# Patient Record
Sex: FEMALE | Race: WHITE | NOT HISPANIC OR LATINO | Employment: UNEMPLOYED | URBAN - METROPOLITAN AREA
[De-identification: names, ages, dates, MRNs, and addresses within clinical notes are randomized per-mention and may not be internally consistent; named-entity substitution may affect disease eponyms.]

---

## 2017-01-10 ENCOUNTER — HOSPITAL ENCOUNTER (EMERGENCY)
Facility: HOSPITAL | Age: 32
Discharge: HOME | End: 2017-01-10
Attending: EMERGENCY MEDICINE
Payer: MEDICAID

## 2017-01-10 VITALS
TEMPERATURE: 99 F | SYSTOLIC BLOOD PRESSURE: 139 MMHG | DIASTOLIC BLOOD PRESSURE: 75 MMHG | HEART RATE: 137 BPM | WEIGHT: 153 LBS | RESPIRATION RATE: 20 BRPM

## 2017-01-10 DIAGNOSIS — L02.91 ABSCESS: Primary | ICD-10-CM

## 2017-01-10 PROCEDURE — 99283 EMERGENCY DEPT VISIT LOW MDM: CPT

## 2017-01-10 PROCEDURE — 25000003 PHARM REV CODE 250: Performed by: EMERGENCY MEDICINE

## 2017-01-10 RX ORDER — METFORMIN HYDROCHLORIDE 500 MG/1
500 TABLET ORAL 2 TIMES DAILY WITH MEALS
COMMUNITY
End: 2019-11-22

## 2017-01-10 RX ORDER — CLINDAMYCIN HYDROCHLORIDE 150 MG/1
300 CAPSULE ORAL
Status: COMPLETED | OUTPATIENT
Start: 2017-01-10 | End: 2017-01-10

## 2017-01-10 RX ORDER — MUPIROCIN 20 MG/G
OINTMENT TOPICAL 3 TIMES DAILY
Qty: 15 G | Refills: 0 | Status: SHIPPED | OUTPATIENT
Start: 2017-01-10 | End: 2017-01-20

## 2017-01-10 RX ORDER — HYDROCODONE BITARTRATE AND ACETAMINOPHEN 5; 325 MG/1; MG/1
1 TABLET ORAL
Status: COMPLETED | OUTPATIENT
Start: 2017-01-10 | End: 2017-01-10

## 2017-01-10 RX ORDER — ALPRAZOLAM 0.5 MG/1
0.5 TABLET ORAL 2 TIMES DAILY PRN
COMMUNITY
End: 2018-01-19

## 2017-01-10 RX ORDER — HYDROCODONE BITARTRATE AND ACETAMINOPHEN 5; 325 MG/1; MG/1
1 TABLET ORAL EVERY 4 HOURS PRN
Qty: 15 TABLET | Refills: 0 | Status: SHIPPED | OUTPATIENT
Start: 2017-01-10 | End: 2017-01-20

## 2017-01-10 RX ORDER — CLINDAMYCIN HYDROCHLORIDE 150 MG/1
300 CAPSULE ORAL 4 TIMES DAILY
Qty: 56 CAPSULE | Refills: 0 | Status: SHIPPED | OUTPATIENT
Start: 2017-01-10 | End: 2017-01-17

## 2017-01-10 RX ADMIN — CLINDAMYCIN HYDROCHLORIDE 300 MG: 150 CAPSULE ORAL at 06:01

## 2017-01-10 RX ADMIN — HYDROCODONE BITARTATE AND ACETAMINOPHEN 1 TABLET: 5; 325 TABLET ORAL at 06:01

## 2017-01-10 NOTE — ED AVS SNAPSHOT
[facility] [address] [facility] [address]               Kiana Ryder   1/10/2017  6:08 PM   ED    Description:  Female : 1985   Department:  [facility]           Your Care was Coordinated By:     Provider Role From To    Dick Del Real MD Attending Provider 01/10/17 0082 --      Reason for Visit     Abscess           Diagnoses this Visit        Comments    Abscess    -  Primary       ED Disposition     ED Disposition Condition Comment    Discharge             To Do List           Follow-up Information     Schedule an appointment as soon as possible for a visit with Isaac Holloway MD.    Specialty:  Family Medicine    Contact information:    [address] [facility]  [place] [id number] [phone]         These Medications        Disp Refills Start End    clindamycin (CLEOCIN) 150 MG capsule 56 capsule 0 1/10/2017 2017    Take 2 capsules (300 mg total) by mouth 4 (four) times daily. - Oral    mupirocin (BACTROBAN) 2 % ointment 15 g 0 1/10/2017 2017    Apply topically 3 (three) times daily. Apply to affected area - Topical (Top)    hydrocodone-acetaminophen 5-325mg (NORCO) 5-325 mg per tablet 15 tablet 0 1/10/2017 2017    Take 1 tablet by mouth every 4 (four) hours as needed. - Oral      [facility] -  Assistance  Registered nurses in the [facility] provide clinical advisement, health education, appointment booking, and other advisory services.  Call for this free service at [phone].             Medications           Message regarding Medications     Verify the changes and/or additions to your medication regime listed below are the same as discussed with your clinician today.  If any of these changes or additions are incorrect, please notify your healthcare provider.        START taking these NEW medications        Refills    clindamycin  (CLEOCIN) 150 MG capsule 0    Sig: Take 2 capsules (300 mg total) by mouth 4 (four) times daily.    Class: Print    Route: Oral    mupirocin (BACTROBAN) 2 % ointment 0    Sig: Apply topically 3 (three) times daily. Apply to affected area    Class: Print    Route: Topical (Top)    hydrocodone-acetaminophen 5-325mg (NORCO) 5-325 mg per tablet 0    Sig: Take 1 tablet by mouth every 4 (four) hours as needed.    Class: Print    Route: Oral      These medications were administered today        Dose Freq    clindamycin capsule 300 mg 300 mg ED 1 Time    Sig: Take 2 capsules (300 mg total) by mouth ED 1 Time.    Class: Normal    Route: Oral    hydrocodone-acetaminophen 5-325mg per tablet 1 tablet 1 tablet ED 1 Time    Sig: Take 1 tablet by mouth ED 1 Time.    Class: Normal    Route: Oral           Verify that the below list of medications is an accurate representation of the medications you are currently taking.  If none reported, the list may be blank. If incorrect, please contact your healthcare provider. Carry this list with you in case of emergency.           Current Medications     alprazolam (XANAX) 0.5 MG tablet Take 0.5 mg by mouth 2 (two) times daily as needed for Anxiety.    estradiol cypionate (DEPO-ESTRADIOL) 5 mg/mL injection Inject into the muscle every 28 days.    metformin (GLUCOPHAGE) 500 MG tablet Take 500 mg by mouth 2 (two) times daily with meals.    clindamycin (CLEOCIN) 150 MG capsule Take 2 capsules (300 mg total) by mouth 4 (four) times daily.    clindamycin capsule 300 mg Take 2 capsules (300 mg total) by mouth ED 1 Time.    hydrocodone-acetaminophen 5-325mg (NORCO) 5-325 mg per tablet Take 1 tablet by mouth every 4 (four) hours as needed.    hydrocodone-acetaminophen 5-325mg per tablet 1 tablet Take 1 tablet by mouth ED 1 Time.    ketorolac (TORADOL) 10 mg tablet Take 1 tablet (10 mg total) by mouth 3 (three) times daily as needed.    mupirocin (BACTROBAN) 2 % ointment Apply topically 3 (three)  times daily. Apply to affected area    oxycodone-acetaminophen (PERCOCET) 5-325 mg per tablet Take 1 tablet by mouth every 4 (four) hours as needed for Pain.    sitagliptan-metformin (JANUMET)  mg per tablet Take 1 tablet by mouth 2 (two) times daily with meals.           Clinical Reference Information           Your Vitals Were     BP Pulse Temp Resp Weight       139/75 (BP Location: Left arm, Patient Position: Standing) 137 98.7 °F (37.1 °C) (Oral) 20 69.4 kg (153 lb)       Allergies as of 1/10/2017     No Known Allergies      Immunizations Administered on Date of Encounter - 1/10/2017     None      ED Micro, Lab, POCT     None      ED Imaging Orders     None        Discharge Instructions         Abscess (Antibiotic Treatment Only)  An abscess (sometimes called a boil) occurs when bacteria get trapped under the skin and begin to grow. Pus forms inside the abscess as the body responds to the bacteria. An abscess can occur with an insect bite, ingrown hair, blocked oil gland, pimple, cyst, or puncture wound.  In the early stages, redness and tenderness are the only symptoms. Sometimes, this stage can be treated with antibiotics alone. If the abscess does not respond to antibiotic treatment, it will need to be drained with a small cut, under local anesthesia.  Home care  The following will help you care for your abscess at home:  · Soak the wound in hot water or apply hot packs (small towel soaked in hot water) to the area for 20 minutes at a time. Do this 3 to 4 times a day.  · Do not dee, squeeze, or pop the boil yourself.  · Apply antibiotic cream or ointment onto the skin 3 to 4 times a day, unless something else was prescribed. Some ointments include an antibiotic plus a local pain reliever.  · If your doctor prescribed antibiotics, do not stop taking this medication until you have finished the medication or the doctor tells you to stop.  · You may use an over-the-counter pain medication to control  pain, unless another pain medicine was prescribed. If you have chronic liver or kidney disease or ever had a stomach ulcer or gastrointestinal bleeding, talk with your doctor before using these any of these.  Follow-up care  Follow up with your health care provider as advised by our staff. Look at your wound each day for the signs of worsening infection listed below.  When to seek medical care  Get prompt medical attention if any of the following occur:  · An increase in redness or swelling  · Red streaks in the skin leading away from the abscess  · An increase in local pain or swelling  · Fever of 100.4ºF (38ºC) or higher, or as directed by your health care provider  · Pus or fluid coming from the abscess  · Boil returns after getting better  © 1463-4479 [employer]. [address] [place] [address], [place] [address]. All rights reserved. This information is not intended as a substitute for professional medical care. Always follow your healthcare professional's instructions.          Smoking Cessation     If you would like to quit smoking:   You may be eligible for free services if you are a [place] resident and started smoking cigarettes before September 1, 1988.  Call the Smoking Cessation Trust (SCT) toll free at (811) 595-4416 or (025) 625-6857.   Call 1-800-QUIT-NOW if you do not meet the above criteria.             [facility] complies with applicable Federal civil rights laws and does not discriminate on the basis of race, color, national origin, age, disability, or sex.        Language Assistance Services     ATTENTION: Language assistance services are available, free of charge. Please call [phone].      ATENCIÓN: Si habla español, tiene a hinojosa disposición servicios gratuitos de asistencia lingüística. Llame al [phone].     CHÚ Ý: N?u b?n nói Ti?ng Vi?t, có các d?ch v? h? tr? ngôn ng? mi?n phí dành cho b?n. G?i s? [phone].

## 2017-01-11 NOTE — DISCHARGE INSTRUCTIONS
Abscess (Antibiotic Treatment Only)  An abscess (sometimes called a boil) occurs when bacteria get trapped under the skin and begin to grow. Pus forms inside the abscess as the body responds to the bacteria. An abscess can occur with an insect bite, ingrown hair, blocked oil gland, pimple, cyst, or puncture wound.  In the early stages, redness and tenderness are the only symptoms. Sometimes, this stage can be treated with antibiotics alone. If the abscess does not respond to antibiotic treatment, it will need to be drained with a small cut, under local anesthesia.  Home care  The following will help you care for your abscess at home:  · Soak the wound in hot water or apply hot packs (small towel soaked in hot water) to the area for 20 minutes at a time. Do this 3 to 4 times a day.  · Do not dee, squeeze, or pop the boil yourself.  · Apply antibiotic cream or ointment onto the skin 3 to 4 times a day, unless something else was prescribed. Some ointments include an antibiotic plus a local pain reliever.  · If your doctor prescribed antibiotics, do not stop taking this medication until you have finished the medication or the doctor tells you to stop.  · You may use an over-the-counter pain medication to control pain, unless another pain medicine was prescribed. If you have chronic liver or kidney disease or ever had a stomach ulcer or gastrointestinal bleeding, talk with your doctor before using these any of these.  Follow-up care  Follow up with your health care provider as advised by our staff. Look at your wound each day for the signs of worsening infection listed below.  When to seek medical care  Get prompt medical attention if any of the following occur:  · An increase in redness or swelling  · Red streaks in the skin leading away from the abscess  · An increase in local pain or swelling  · Fever of 100.4ºF (38ºC) or higher, or as directed by your health care provider  · Pus or fluid coming from the  abscess  · Boil returns after getting better  © 4706-5953 [employer], [employer]. [address] [place] [address], [place] [address]. All rights reserved. This information is not intended as a substitute for professional medical care. Always follow your healthcare professional's instructions.

## 2017-01-11 NOTE — ED PROVIDER NOTES
Encounter Date: 1/10/2017       History     Chief Complaint   Patient presents with    Abscess     right buttox     Review of patient's allergies indicates:  No Known Allergies  Patient is a 31 y.o. female presenting with the following complaint: abscess. The history is provided by the patient.   Abscess    This is a new problem. The current episode started several days ago. The problem has been gradually improving. The abscess is present on the right buttock. The pain is at a severity of 4/10. The abscess is characterized by redness, scaling and draining. Pertinent negatives include no anorexia, no decrease in physical activity, not sleeping less, not drinking less, no fever, no diarrhea, no vomiting, no congestion, no rhinorrhea, no sore throat, no decreased responsiveness and no cough. There were no sick contacts. She has received no recent medical care.     Past Medical History   Diagnosis Date    Diabetes mellitus      No past medical history pertinent negatives.  Past Surgical History   Procedure Laterality Date    Cholecystectomy       section       History reviewed. No pertinent family history.  Social History   Substance Use Topics    Smoking status: Current Some Day Smoker     Packs/day: 1.00     Years: 15.00     Types: Cigarettes    Smokeless tobacco: None    Alcohol use Yes      Comment: occasionally     Review of Systems   Constitutional: Negative for decreased responsiveness and fever.   HENT: Negative for congestion, ear discharge, ear pain, rhinorrhea and sore throat.    Eyes: Negative for pain, discharge, redness and itching.   Respiratory: Negative for cough, shortness of breath, wheezing and stridor.    Cardiovascular: Negative for chest pain, palpitations and leg swelling.   Gastrointestinal: Negative for anorexia, diarrhea and vomiting.   Genitourinary: Negative for enuresis and flank pain.   Musculoskeletal: Negative for back pain, gait problem, neck pain and neck stiffness.    Skin: Positive for rash. Negative for wound.   Neurological: Negative for tremors, seizures, syncope, speech difficulty and weakness.       Physical Exam   Initial Vitals   BP Pulse Resp Temp SpO2   01/10/17 1804 01/10/17 1804 01/10/17 1804 01/10/17 1804 --   139/75 137 20 98.7 °F (37.1 °C)      Physical Exam    Nursing note and vitals reviewed.  Constitutional: She appears well-developed and well-nourished. She is not diaphoretic. No distress.   HENT:   Head: Normocephalic and atraumatic.   Mouth/Throat: No oropharyngeal exudate.   Eyes: Conjunctivae are normal. Pupils are equal, round, and reactive to light. Right eye exhibits no discharge. Left eye exhibits no discharge. No scleral icterus.   Neck: Normal range of motion. Neck supple.   Cardiovascular: Exam reveals no friction rub.    Pulmonary/Chest: Breath sounds normal. No respiratory distress. She has no wheezes. She has no rhonchi. She has no rales.   Abdominal: Soft. Bowel sounds are normal. She exhibits no distension. There is no tenderness. There is no rebound and no guarding.   Musculoskeletal: Normal range of motion. She exhibits no edema or tenderness.   Lymphadenopathy:     She has no cervical adenopathy.   Neurological: She is alert. She has normal strength and normal reflexes. No sensory deficit.   Skin: Abscess noted.              ED Course   Procedures  Labs Reviewed - No data to display                            ED Course     Clinical Impression:   The encounter diagnosis was Abscess.    Disposition:   Disposition: Discharged  Condition: Stable       Dick Del Real MD  01/10/17 4655

## 2017-01-11 NOTE — ED NOTES
"Pt presents to ER with abscess to right butt ox. Pt states that "it started as a little pimple but now is the size of a grape fruit."   "

## 2018-08-06 PROBLEM — L02.91 ABSCESS: Status: ACTIVE | Noted: 2018-08-06

## 2018-08-07 PROBLEM — Z72.0 TOBACCO ABUSE: Status: ACTIVE | Noted: 2018-08-07

## 2018-08-07 PROBLEM — E11.9 TYPE 2 DIABETES MELLITUS, WITHOUT LONG-TERM CURRENT USE OF INSULIN: Status: ACTIVE | Noted: 2018-08-07

## 2018-08-07 PROBLEM — L03.213 CELLULITIS OF PERIORBITAL REGION OF BOTH EYES: Status: ACTIVE | Noted: 2018-08-07

## 2019-07-24 ENCOUNTER — LAB VISIT (OUTPATIENT)
Dept: LAB | Facility: HOSPITAL | Age: 34
End: 2019-07-24
Attending: GENERAL PRACTICE
Payer: MEDICAID

## 2019-07-24 DIAGNOSIS — E03.9 HYPOTHYROID: ICD-10-CM

## 2019-07-24 DIAGNOSIS — E11.9 DM (DIABETES MELLITUS): ICD-10-CM

## 2019-07-24 DIAGNOSIS — I10 HTN (HYPERTENSION): Primary | ICD-10-CM

## 2019-07-24 LAB
ALBUMIN SERPL BCP-MCNC: 2.9 G/DL (ref 3.5–5.2)
ALP SERPL-CCNC: 58 U/L (ref 55–135)
ALT SERPL W/O P-5'-P-CCNC: 14 U/L (ref 10–44)
ANION GAP SERPL CALC-SCNC: 9 MMOL/L (ref 8–16)
AST SERPL-CCNC: 8 U/L (ref 10–40)
BASOPHILS # BLD AUTO: 0.06 K/UL (ref 0–0.2)
BASOPHILS NFR BLD: 0.5 % (ref 0–1.9)
BILIRUB SERPL-MCNC: 0.2 MG/DL (ref 0.1–1)
BUN SERPL-MCNC: 11 MG/DL (ref 6–20)
CALCIUM SERPL-MCNC: 9.1 MG/DL (ref 8.7–10.5)
CHLORIDE SERPL-SCNC: 102 MMOL/L (ref 95–110)
CHOLEST SERPL-MCNC: 192 MG/DL (ref 120–199)
CHOLEST/HDLC SERPL: 4.3 {RATIO} (ref 2–5)
CO2 SERPL-SCNC: 24 MMOL/L (ref 23–29)
CREAT SERPL-MCNC: 0.7 MG/DL (ref 0.5–1.4)
DIFFERENTIAL METHOD: ABNORMAL
EOSINOPHIL # BLD AUTO: 0.3 K/UL (ref 0–0.5)
EOSINOPHIL NFR BLD: 2.2 % (ref 0–8)
ERYTHROCYTE [DISTWIDTH] IN BLOOD BY AUTOMATED COUNT: 14.2 % (ref 11.5–14.5)
EST. GFR  (AFRICAN AMERICAN): >60 ML/MIN/1.73 M^2
EST. GFR  (NON AFRICAN AMERICAN): >60 ML/MIN/1.73 M^2
ESTIMATED AVG GLUCOSE: 148 MG/DL (ref 68–131)
GLUCOSE SERPL-MCNC: 166 MG/DL (ref 70–110)
HBA1C MFR BLD HPLC: 6.8 % (ref 4–5.6)
HCT VFR BLD AUTO: 38.9 % (ref 37–48.5)
HDLC SERPL-MCNC: 45 MG/DL (ref 40–75)
HDLC SERPL: 23.4 % (ref 20–50)
HGB BLD-MCNC: 12.6 G/DL (ref 12–16)
IMM GRANULOCYTES # BLD AUTO: 0.11 K/UL (ref 0–0.04)
IMM GRANULOCYTES NFR BLD AUTO: 1 % (ref 0–0.5)
LDLC SERPL CALC-MCNC: 106.8 MG/DL (ref 63–159)
LYMPHOCYTES # BLD AUTO: 3.5 K/UL (ref 1–4.8)
LYMPHOCYTES NFR BLD: 30.8 % (ref 18–48)
MCH RBC QN AUTO: 28.8 PG (ref 27–31)
MCHC RBC AUTO-ENTMCNC: 32.4 G/DL (ref 32–36)
MCV RBC AUTO: 89 FL (ref 82–98)
MONOCYTES # BLD AUTO: 0.9 K/UL (ref 0.3–1)
MONOCYTES NFR BLD: 8.1 % (ref 4–15)
NEUTROPHILS # BLD AUTO: 6.5 K/UL (ref 1.8–7.7)
NEUTROPHILS NFR BLD: 57.4 % (ref 38–73)
NONHDLC SERPL-MCNC: 147 MG/DL
NRBC BLD-RTO: 0 /100 WBC
PLATELET # BLD AUTO: 231 K/UL (ref 150–350)
PMV BLD AUTO: 11.2 FL (ref 9.2–12.9)
POTASSIUM SERPL-SCNC: 4.1 MMOL/L (ref 3.5–5.1)
PROT SERPL-MCNC: 6.3 G/DL (ref 6–8.4)
RBC # BLD AUTO: 4.37 M/UL (ref 4–5.4)
SODIUM SERPL-SCNC: 135 MMOL/L (ref 136–145)
T4 FREE SERPL-MCNC: 0.85 NG/DL (ref 0.71–1.51)
TRIGL SERPL-MCNC: 201 MG/DL (ref 30–150)
TSH SERPL DL<=0.005 MIU/L-ACNC: 4.04 UIU/ML (ref 0.4–4)
WBC # BLD AUTO: 11.36 K/UL (ref 3.9–12.7)

## 2019-07-24 PROCEDURE — 80061 LIPID PANEL: CPT

## 2019-07-24 PROCEDURE — 36415 COLL VENOUS BLD VENIPUNCTURE: CPT

## 2019-07-24 PROCEDURE — 84439 ASSAY OF FREE THYROXINE: CPT

## 2019-07-24 PROCEDURE — 80053 COMPREHEN METABOLIC PANEL: CPT

## 2019-07-24 PROCEDURE — 83036 HEMOGLOBIN GLYCOSYLATED A1C: CPT

## 2019-07-24 PROCEDURE — 84443 ASSAY THYROID STIM HORMONE: CPT

## 2019-07-24 PROCEDURE — 85025 COMPLETE CBC W/AUTO DIFF WBC: CPT

## 2019-08-02 ENCOUNTER — HOSPITAL ENCOUNTER (EMERGENCY)
Facility: HOSPITAL | Age: 34
Discharge: HOME | End: 2019-08-02
Attending: SURGERY
Payer: MEDICAID

## 2019-08-02 VITALS
OXYGEN SATURATION: 99 % | TEMPERATURE: 98 F | SYSTOLIC BLOOD PRESSURE: 135 MMHG | DIASTOLIC BLOOD PRESSURE: 66 MMHG | HEART RATE: 90 BPM | RESPIRATION RATE: 20 BRPM

## 2019-08-02 DIAGNOSIS — Z33.1 PREGNANCY AS INCIDENTAL FINDING: Primary | ICD-10-CM

## 2019-08-02 DIAGNOSIS — O23.41 URINARY TRACT INFECTION IN MOTHER DURING FIRST TRIMESTER OF PREGNANCY: ICD-10-CM

## 2019-08-02 DIAGNOSIS — R10.2 PELVIC PRESSURE IN PREGNANCY: ICD-10-CM

## 2019-08-02 DIAGNOSIS — O26.899 PELVIC PRESSURE IN PREGNANCY: ICD-10-CM

## 2019-08-02 LAB
ALBUMIN SERPL BCP-MCNC: 3 G/DL (ref 3.5–5.2)
ALP SERPL-CCNC: 45 U/L (ref 55–135)
ALT SERPL W/O P-5'-P-CCNC: 6 U/L (ref 10–44)
AMPHET+METHAMPHET UR QL: NEGATIVE
ANION GAP SERPL CALC-SCNC: 12 MMOL/L (ref 8–16)
AST SERPL-CCNC: 9 U/L (ref 10–40)
B-HCG UR QL: POSITIVE
BACTERIA #/AREA URNS HPF: ABNORMAL /HPF
BARBITURATES UR QL SCN>200 NG/ML: NEGATIVE
BASOPHILS # BLD AUTO: 0.04 K/UL (ref 0–0.2)
BASOPHILS NFR BLD: 0.4 % (ref 0–1.9)
BENZODIAZ UR QL SCN>200 NG/ML: NEGATIVE
BILIRUB SERPL-MCNC: 0.2 MG/DL (ref 0.1–1)
BILIRUB UR QL STRIP: NEGATIVE
BUN SERPL-MCNC: 10 MG/DL (ref 6–20)
BZE UR QL SCN: NEGATIVE
CALCIUM SERPL-MCNC: 8.7 MG/DL (ref 8.7–10.5)
CANNABINOIDS UR QL SCN: NORMAL
CHLORIDE SERPL-SCNC: 107 MMOL/L (ref 95–110)
CK SERPL-CCNC: 47 U/L (ref 20–180)
CLARITY UR: ABNORMAL
CO2 SERPL-SCNC: 18 MMOL/L (ref 23–29)
COLOR UR: YELLOW
CREAT SERPL-MCNC: 0.7 MG/DL (ref 0.5–1.4)
CREAT UR-MCNC: 107.6 MG/DL (ref 15–325)
DIFFERENTIAL METHOD: ABNORMAL
EOSINOPHIL # BLD AUTO: 0.2 K/UL (ref 0–0.5)
EOSINOPHIL NFR BLD: 1.4 % (ref 0–8)
ERYTHROCYTE [DISTWIDTH] IN BLOOD BY AUTOMATED COUNT: 14.4 % (ref 11.5–14.5)
EST. GFR  (AFRICAN AMERICAN): >60 ML/MIN/1.73 M^2
EST. GFR  (NON AFRICAN AMERICAN): >60 ML/MIN/1.73 M^2
GLUCOSE SERPL-MCNC: 136 MG/DL (ref 70–110)
GLUCOSE UR QL STRIP: ABNORMAL
HCG INTACT+B SERPL-ACNC: NORMAL MIU/ML
HCT VFR BLD AUTO: 34.3 % (ref 37–48.5)
HGB BLD-MCNC: 11.3 G/DL (ref 12–16)
HGB UR QL STRIP: NEGATIVE
IMM GRANULOCYTES # BLD AUTO: 0.05 K/UL (ref 0–0.04)
IMM GRANULOCYTES NFR BLD AUTO: 0.5 % (ref 0–0.5)
KETONES UR QL STRIP: NEGATIVE
LEUKOCYTE ESTERASE UR QL STRIP: ABNORMAL
LYMPHOCYTES # BLD AUTO: 3.2 K/UL (ref 1–4.8)
LYMPHOCYTES NFR BLD: 29.6 % (ref 18–48)
MAGNESIUM SERPL-MCNC: 1.6 MG/DL (ref 1.6–2.6)
MCH RBC QN AUTO: 28.9 PG (ref 27–31)
MCHC RBC AUTO-ENTMCNC: 32.9 G/DL (ref 32–36)
MCV RBC AUTO: 88 FL (ref 82–98)
METHADONE UR QL SCN>300 NG/ML: NEGATIVE
MICROSCOPIC COMMENT: ABNORMAL
MONOCYTES # BLD AUTO: 0.6 K/UL (ref 0.3–1)
MONOCYTES NFR BLD: 5.6 % (ref 4–15)
NEUTROPHILS # BLD AUTO: 6.8 K/UL (ref 1.8–7.7)
NEUTROPHILS NFR BLD: 62.5 % (ref 38–73)
NITRITE UR QL STRIP: POSITIVE
NRBC BLD-RTO: 0 /100 WBC
OPIATES UR QL SCN: NEGATIVE
PCP UR QL SCN>25 NG/ML: NEGATIVE
PH UR STRIP: 7 [PH] (ref 5–8)
PHOSPHATE SERPL-MCNC: 3.3 MG/DL (ref 2.7–4.5)
PLATELET # BLD AUTO: 227 K/UL (ref 150–350)
PMV BLD AUTO: 10.7 FL (ref 9.2–12.9)
POTASSIUM SERPL-SCNC: 3.3 MMOL/L (ref 3.5–5.1)
PROT SERPL-MCNC: 6.1 G/DL (ref 6–8.4)
PROT UR QL STRIP: NEGATIVE
RBC # BLD AUTO: 3.91 M/UL (ref 4–5.4)
SODIUM SERPL-SCNC: 137 MMOL/L (ref 136–145)
SP GR UR STRIP: 1.02 (ref 1–1.03)
TOXICOLOGY INFORMATION: NORMAL
TSH SERPL DL<=0.005 MIU/L-ACNC: 1.42 UIU/ML (ref 0.4–4)
URN SPEC COLLECT METH UR: ABNORMAL
UROBILINOGEN UR STRIP-ACNC: NEGATIVE EU/DL
WBC # BLD AUTO: 10.8 K/UL (ref 3.9–12.7)
WBC #/AREA URNS HPF: 40 /HPF (ref 0–5)
WBC CLUMPS URNS QL MICRO: ABNORMAL

## 2019-08-02 PROCEDURE — 87088 URINE BACTERIA CULTURE: CPT

## 2019-08-02 PROCEDURE — 81025 URINE PREGNANCY TEST: CPT

## 2019-08-02 PROCEDURE — 80053 COMPREHEN METABOLIC PANEL: CPT

## 2019-08-02 PROCEDURE — 81000 URINALYSIS NONAUTO W/SCOPE: CPT | Mod: 59

## 2019-08-02 PROCEDURE — 80307 DRUG TEST PRSMV CHEM ANLYZR: CPT

## 2019-08-02 PROCEDURE — 87086 URINE CULTURE/COLONY COUNT: CPT

## 2019-08-02 PROCEDURE — 84100 ASSAY OF PHOSPHORUS: CPT

## 2019-08-02 PROCEDURE — 36415 COLL VENOUS BLD VENIPUNCTURE: CPT

## 2019-08-02 PROCEDURE — 85025 COMPLETE CBC W/AUTO DIFF WBC: CPT

## 2019-08-02 PROCEDURE — 87077 CULTURE AEROBIC IDENTIFY: CPT | Mod: 59

## 2019-08-02 PROCEDURE — 84443 ASSAY THYROID STIM HORMONE: CPT

## 2019-08-02 PROCEDURE — 87186 SC STD MICRODIL/AGAR DIL: CPT

## 2019-08-02 PROCEDURE — 99284 EMERGENCY DEPT VISIT MOD MDM: CPT

## 2019-08-02 PROCEDURE — 84702 CHORIONIC GONADOTROPIN TEST: CPT

## 2019-08-02 PROCEDURE — 82550 ASSAY OF CK (CPK): CPT

## 2019-08-02 PROCEDURE — 83735 ASSAY OF MAGNESIUM: CPT

## 2019-08-02 RX ORDER — NITROFURANTOIN 25; 75 MG/1; MG/1
100 CAPSULE ORAL 2 TIMES DAILY
Qty: 14 CAPSULE | Refills: 0 | Status: SHIPPED | OUTPATIENT
Start: 2019-08-02 | End: 2019-08-09

## 2019-08-02 NOTE — ED PROVIDER NOTES
"Encounter Date: 2019       History     Chief Complaint   Patient presents with    Leg Pain     bilateral cramping     Kiana Ryder is a 33 y.o. Female with PMH of DM who presents to the ED with reports of bilateral leg cramps. Patient reports that she started a new job that requires climbing stairs. She reports that she is not used to this much physical activity and her legs started hurting after starting new job. She reports "muscle" pain to bilateral legs that is not relieved by otc medication. Pain is the same all the time, occurring mostly at night. She currently rates pain 4/10 on pain scale. She denies swelling. She denies chest pain or shortness of breath.     The history is provided by the patient.     Review of patient's allergies indicates:  No Known Allergies  Past Medical History:   Diagnosis Date    Diabetes mellitus      Past Surgical History:   Procedure Laterality Date     SECTION      CHOLECYSTECTOMY       History reviewed. No pertinent family history.  Social History     Tobacco Use    Smoking status: Current Some Day Smoker     Packs/day: 1.00     Years: 15.00     Pack years: 15.00     Types: Cigarettes    Smokeless tobacco: Never Used   Substance Use Topics    Alcohol use: Yes     Comment: occasionally    Drug use: Yes     Types: Methamphetamines     Review of Systems   Constitutional: Negative.  Negative for activity change, chills and fever.   HENT: Negative.  Negative for congestion, ear discharge, ear pain, postnasal drip, sinus pressure, sinus pain and sore throat.    Eyes: Negative.    Respiratory: Negative.  Negative for cough, chest tightness and shortness of breath.    Cardiovascular: Negative.  Negative for chest pain.   Gastrointestinal: Negative.  Negative for abdominal distention, abdominal pain and nausea.   Endocrine: Negative.    Genitourinary: Negative.  Negative for dysuria, frequency and urgency.   Musculoskeletal: Positive for myalgias. Negative for back " pain and joint swelling.   Skin: Negative.  Negative for rash.   Allergic/Immunologic: Negative.    Neurological: Negative.  Negative for dizziness, weakness, light-headedness and numbness.   Hematological: Negative.  Does not bruise/bleed easily.   Psychiatric/Behavioral: Negative.        Physical Exam     Initial Vitals [08/02/19 1344]   BP Pulse Resp Temp SpO2   (!) 145/67 101 20 98.5 °F (36.9 °C) 100 %      MAP       --         Physical Exam    Nursing note and vitals reviewed.  Constitutional: She appears well-developed and well-nourished.   HENT:   Head: Normocephalic and atraumatic.   Right Ear: Tympanic membrane, external ear and ear canal normal.   Left Ear: Tympanic membrane, external ear and ear canal normal.   Nose: Nose normal.   Mouth/Throat: Uvula is midline, oropharynx is clear and moist and mucous membranes are normal.   Eyes: Conjunctivae and EOM are normal. Pupils are equal, round, and reactive to light.   Neck: Normal range of motion. Neck supple.   Cardiovascular: Normal rate, regular rhythm, normal heart sounds, intact distal pulses and normal pulses.  No extrasystoles are present.    Pulmonary/Chest: Effort normal and breath sounds normal. She has no decreased breath sounds. She has no wheezes. She has no rhonchi. She has no rales.   Abdominal: Soft. Normal appearance and bowel sounds are normal. There is no tenderness. There is no rigidity, no rebound, no guarding, no CVA tenderness, no tenderness at McBurney's point and negative Tidwell's sign. No hernia.   Musculoskeletal: Normal range of motion.   Neurological: She is alert and oriented to person, place, and time. She has normal strength and normal reflexes. She displays normal reflexes. No cranial nerve deficit or sensory deficit. GCS eye subscore is 4. GCS verbal subscore is 5. GCS motor subscore is 6.   Skin: Skin is warm and dry. Capillary refill takes less than 2 seconds. No rash noted.   Psychiatric: She has a normal mood and affect.  Her behavior is normal. Judgment and thought content normal.         ED Course   Procedures  Labs Reviewed   CBC W/ AUTO DIFFERENTIAL - Abnormal; Notable for the following components:       Result Value    RBC 3.91 (*)     Hemoglobin 11.3 (*)     Hematocrit 34.3 (*)     Immature Grans (Abs) 0.05 (*)     All other components within normal limits   COMPREHENSIVE METABOLIC PANEL - Abnormal; Notable for the following components:    Potassium 3.3 (*)     CO2 18 (*)     Glucose 136 (*)     Albumin 3.0 (*)     Alkaline Phosphatase 45 (*)     AST 9 (*)     ALT 6 (*)     All other components within normal limits   URINALYSIS, REFLEX TO URINE CULTURE - Abnormal; Notable for the following components:    Appearance, UA Hazy (*)     Glucose, UA Trace (*)     Nitrite, UA Positive (*)     Leukocytes, UA Trace (*)     All other components within normal limits    Narrative:     Preferred Collection Type->Urine, Clean Catch   PREGNANCY TEST, URINE RAPID - Abnormal; Notable for the following components:    Preg Test, Ur Positive (*)     All other components within normal limits   URINALYSIS MICROSCOPIC - Abnormal; Notable for the following components:    WBC, UA 40 (*)     WBC Clumps, UA Few (*)     Bacteria Many (*)     All other components within normal limits    Narrative:     Preferred Collection Type->Urine, Clean Catch   CULTURE, URINE   DRUG SCREEN PANEL, URINE EMERGENCY   MAGNESIUM   PHOSPHORUS   TSH   CK   HCG, QUANTITATIVE, PREGNANCY          Imaging Results           US OB Less Than 14 Wks First Gestation (Final result)  Result time 08/02/19 16:12:16    Final result by Tyler Jensen MD (08/02/19 16:12:16)                 Impression:      1. Single, living intrauterine gestation and estimated sonographic age of 10 weeks and 2 days.  2. Large complex, cystic and septated right adnexal mass lesion measuring approximately 17 x 15 cm in size.  Cystic ovarian neoplasm is a consideration and follow-up is recommended.  This  "report was flagged in Epic as abnormal.      Electronically signed by: Tyler Jensen MD  Date:    08/02/2019  Time:    16:12             Narrative:    EXAMINATION:  US OB LESS THAN 14 WEEKS FIRST GESTATION    CLINICAL HISTORY:  Pregnant state, incidental    TECHNIQUE:  Real-time early OB ultrasound examination was performed.    COMPARISON:  None.    FINDINGS:  Early OB ultrasound examination dated August 2, 2019.    There is a single, living intrauterine gestation with a heart rate of 152 beats/min.  Based on the crown-rump length measurement estimated sonographic age is 10 weeks and 2 days.    No evidence of a placental abnormality.  No evidence of hemorrhage.    The left ovary appears normal measuring 3.5 x 4.3 x 2.4 cm.  Normal blood flow to the left ovary.  There is a large complex cystic right adnexal mass lesion measuring approximately 17 x 15 cm.  Normal right ovarian tissue could not be visualized.  No significant free pelvic fluid.                                 Medical Decision Making:   Clinical Tests:   Lab Tests: Ordered and Reviewed  Radiological Study: Ordered and Reviewed  ED Management:  Patient presents today with restless legs, incidental positive UPT noted. She reports occasional pelvic pressure; denies vaginal discharge, bleeding or leakage. Reports LMP "middle of June, but was barely a period." Ultrasound completed;   1. Single, living intrauterine gestation and estimated sonographic age of 10 weeks and 2 days.  2. Large complex, cystic and septated right adnexal mass lesion measuring approximately 17 x 15 cm in size.  Cystic ovarian neoplasm is a consideration and follow-up is recommended  Patient extensively counseled on close f/u with OB/GYN; she voice understanding of instructions. Patient was given specific return precautions. The patient agrees to comply with all instruction and directions given in the ER.                       Clinical Impression:       ICD-10-CM ICD-9-CM   1. Pregnancy " as incidental finding Z33.1 V22.2   2. Pelvic pressure in pregnancy O26.899 646.83    R10.2 625.9   3. Urinary tract infection in mother during first trimester of pregnancy O23.41 646.63     599.0         Disposition:   Disposition: Discharged  Condition: Stable    Discharge Medication List as of 8/2/2019  4:19 PM      START taking these medications    Details   nitrofurantoin, macrocrystal-monohydrate, (MACROBID) 100 MG capsule Take 1 capsule (100 mg total) by mouth 2 (two) times daily. for 7 days, Starting Fri 8/2/2019, Until Fri 8/9/2019, Print            The patient acknowledges that close follow up with medical provider is required. Instructed to follow up with PCP within 2 days. Patient was given specific return precautions. The patient agrees to comply with all instruction and directions given in the ER.                  Fatmata Juan NP  08/02/19 5541

## 2019-08-02 NOTE — ED TRIAGE NOTES
Patient presents to the ER with bilateral leg pain and cramps.  Patient reports a new job where she has to climb many stairs daily.

## 2019-08-06 LAB — BACTERIA UR CULT: ABNORMAL

## 2019-09-10 ENCOUNTER — TELEPHONE (OUTPATIENT)
Dept: OBSTETRICS AND GYNECOLOGY | Facility: CLINIC | Age: 34
End: 2019-09-10

## 2019-09-10 NOTE — TELEPHONE ENCOUNTER
----- Message from Zully Rodgers sent at 9/10/2019 12:30 PM CDT -----  Contact: Self      ----- Message -----  From: Chica Faust  Sent: 9/10/2019  12:20 PM  To: [facility] Staff    Kiana Ryder  MRN: [id number]  Home Phone      [phone]  Work Phone      Not on file.  Mobile          [phone]    Patient Care Team:  Grady Billings MD as PCP - General (Internal Medicine)  OB? Yes, 15 Weeks  What phone number can you be reached at? [phone]  Message:   Pt would like to set up can appt for a Dx preg, she states she has a history of diabetes, thyroid issues and a 20 cm cyst on ovary. Please return call.

## 2019-09-10 NOTE — TELEPHONE ENCOUNTER
Pt states that she is 15 weeks pregnant. Pt states LMP in June 2019 with an DORON of 2/28/2020. Pt states that she was seeing an OBGYN in [place] Dr Frazier but has recently moved to this area. Pt states that she had a pap smear done this year as well. Adv pt to have atleast her last year of records faxed to us, or that she may need to signa medical release in our office to obtain these records. Pt states that she is diabetic type 2, has hypothyroidism and was recently diagnosed at 10w gestation with a 7cm cyst. Pt states that over a 2 week period her cyst has grown from 7cm to 20cm. Pt states she had a follow up appointment with her doctor in [place] but could not make it to that appointment. Pt requesting to be seen here as soon as possible. Adv pt that she would be scheduled for the OB visit with Ms Rosetta Glover CNM but may need to start seeing a doctor in our office to manage her complications. Pt voiced understanding.

## 2019-09-13 DIAGNOSIS — N83.299 COMPLEX OVARIAN CYST: ICD-10-CM

## 2019-09-13 DIAGNOSIS — O24.111 PRE-EXISTING TYPE 2 DIABETES MELLITUS DURING PREGNANCY IN FIRST TRIMESTER: Primary | ICD-10-CM

## 2019-09-17 ENCOUNTER — INITIAL CONSULT (OUTPATIENT)
Dept: MATERNAL FETAL MEDICINE | Facility: CLINIC | Age: 34
End: 2019-09-17
Attending: OBSTETRICS & GYNECOLOGY
Payer: MEDICAID

## 2019-09-17 ENCOUNTER — INITIAL CONSULT (OUTPATIENT)
Dept: GYNECOLOGIC ONCOLOGY | Facility: CLINIC | Age: 34
End: 2019-09-17
Payer: MEDICAID

## 2019-09-17 ENCOUNTER — PROCEDURE VISIT (OUTPATIENT)
Dept: MATERNAL FETAL MEDICINE | Facility: CLINIC | Age: 34
End: 2019-09-17
Payer: MEDICAID

## 2019-09-17 ENCOUNTER — TELEPHONE (OUTPATIENT)
Dept: GYNECOLOGIC ONCOLOGY | Facility: CLINIC | Age: 34
End: 2019-09-17

## 2019-09-17 VITALS
WEIGHT: 168.19 LBS | SYSTOLIC BLOOD PRESSURE: 114 MMHG | HEIGHT: 64 IN | BODY MASS INDEX: 28.71 KG/M2 | DIASTOLIC BLOOD PRESSURE: 60 MMHG

## 2019-09-17 VITALS
HEART RATE: 105 BPM | BODY MASS INDEX: 29.06 KG/M2 | HEIGHT: 64 IN | DIASTOLIC BLOOD PRESSURE: 61 MMHG | WEIGHT: 170.19 LBS | SYSTOLIC BLOOD PRESSURE: 129 MMHG

## 2019-09-17 DIAGNOSIS — N83.209 CYST OF OVARY, UNSPECIFIED LATERALITY: Primary | ICD-10-CM

## 2019-09-17 DIAGNOSIS — O99.332 MATERNAL TOBACCO USE IN SECOND TRIMESTER: ICD-10-CM

## 2019-09-17 DIAGNOSIS — R19.00 PELVIC MASS: ICD-10-CM

## 2019-09-17 DIAGNOSIS — N83.299 COMPLEX OVARIAN CYST: ICD-10-CM

## 2019-09-17 DIAGNOSIS — Z36.4 ANTENATAL SCREENING FOR FETAL GROWTH RETARDATION USING ULTRASONICS: ICD-10-CM

## 2019-09-17 DIAGNOSIS — Z3A.16 16 WEEKS GESTATION OF PREGNANCY: ICD-10-CM

## 2019-09-17 DIAGNOSIS — Z36.89 ENCOUNTER FOR FETAL ANATOMIC SURVEY: Primary | ICD-10-CM

## 2019-09-17 DIAGNOSIS — Z34.90 PREGNANCY, UNSPECIFIED GESTATIONAL AGE: Primary | ICD-10-CM

## 2019-09-17 DIAGNOSIS — O24.112 PRE-EXISTING TYPE 2 DIABETES MELLITUS DURING PREGNANCY IN SECOND TRIMESTER: ICD-10-CM

## 2019-09-17 DIAGNOSIS — N83.8 OVARIAN MASS: ICD-10-CM

## 2019-09-17 DIAGNOSIS — O99.891 CURRENT MATERNAL CONDITION AFFECTING PREGNANCY: ICD-10-CM

## 2019-09-17 DIAGNOSIS — O24.111 PRE-EXISTING TYPE 2 DIABETES MELLITUS DURING PREGNANCY IN FIRST TRIMESTER: ICD-10-CM

## 2019-09-17 PROCEDURE — 76815 OB US LIMITED FETUS(S): CPT | Mod: 26,S$PBB,, | Performed by: OBSTETRICS & GYNECOLOGY

## 2019-09-17 PROCEDURE — 99203 PR OFFICE/OUTPT VISIT, NEW, LEVL III, 30-44 MIN: ICD-10-PCS | Mod: 25,S$PBB,TH, | Performed by: OBSTETRICS & GYNECOLOGY

## 2019-09-17 PROCEDURE — 99213 OFFICE O/P EST LOW 20 MIN: CPT | Mod: PBBFAC,25,27,TH | Performed by: OBSTETRICS & GYNECOLOGY

## 2019-09-17 PROCEDURE — 99999 PR PBB SHADOW E&M-EST. PATIENT-LVL III: CPT | Mod: PBBFAC,,, | Performed by: OBSTETRICS & GYNECOLOGY

## 2019-09-17 PROCEDURE — 99205 OFFICE O/P NEW HI 60 MIN: CPT | Mod: S$PBB,TH,, | Performed by: OBSTETRICS & GYNECOLOGY

## 2019-09-17 PROCEDURE — 76815 OB US LIMITED FETUS(S): CPT | Mod: PBBFAC | Performed by: OBSTETRICS & GYNECOLOGY

## 2019-09-17 PROCEDURE — 99999 PR PBB SHADOW E&M-EST. PATIENT-LVL III: ICD-10-PCS | Mod: PBBFAC,,, | Performed by: OBSTETRICS & GYNECOLOGY

## 2019-09-17 PROCEDURE — 99203 OFFICE O/P NEW LOW 30 MIN: CPT | Mod: 25,S$PBB,TH, | Performed by: OBSTETRICS & GYNECOLOGY

## 2019-09-17 PROCEDURE — 99205 PR OFFICE/OUTPT VISIT, NEW, LEVL V, 60-74 MIN: ICD-10-PCS | Mod: S$PBB,TH,, | Performed by: OBSTETRICS & GYNECOLOGY

## 2019-09-17 PROCEDURE — 76815 PR  US,PREGNANT UTERUS,LIMITED, 1/> FETUSES: ICD-10-PCS | Mod: 26,S$PBB,, | Performed by: OBSTETRICS & GYNECOLOGY

## 2019-09-17 PROCEDURE — 99213 OFFICE O/P EST LOW 20 MIN: CPT | Mod: PBBFAC,TH,25 | Performed by: OBSTETRICS & GYNECOLOGY

## 2019-09-17 RX ORDER — PRENATAL VIT NO.126/IRON/FOLIC 28MG-0.8MG
1 TABLET ORAL DAILY
Refills: 0 | COMMUNITY
Start: 2019-08-14 | End: 2021-05-18

## 2019-09-17 RX ORDER — INSULIN LISPRO 100 [IU]/ML
5 INJECTION, SOLUTION INTRAVENOUS; SUBCUTANEOUS 2 TIMES DAILY
COMMUNITY
Start: 2019-09-16 | End: 2019-10-18

## 2019-09-17 RX ORDER — PEN NEEDLE, DIABETIC 29 G X1/2"
NEEDLE, DISPOSABLE MISCELLANEOUS
COMMUNITY
Start: 2019-09-16 | End: 2019-12-23 | Stop reason: SDUPTHER

## 2019-09-17 RX ORDER — GLUCOSAM/CHON-MSM1/C/MANG/BOSW 500-416.6
TABLET ORAL
COMMUNITY
Start: 2019-09-04

## 2019-09-17 RX ORDER — CALCIUM CITRATE/VITAMIN D3 200MG-6.25
TABLET ORAL
COMMUNITY
Start: 2019-09-04 | End: 2024-01-22

## 2019-09-17 NOTE — LETTER
September 19, 2019      Aguilar Robison MD  [address] [address]           [facility] [address]  [address] [facility] [address]  Phone: [phone]          Patient: Kiana Ryder   MR Number: [id number]   YOB: 1985   Date of Visit: 9/17/2019       Dear Dr. Aguilar Robison:    Thank you for referring Kiana Ryder to me for evaluation. Attached you will find relevant portions of my assessment and plan of care.    If you have questions, please do not hesitate to call me. I look forward to following Kiana Ryder along with you.    Sincerely,    Joleen Varner MD    Enclosure  CC:  No Recipients    If you would like to receive this communication electronically, please contact [email or web address] or [phone] to request more information on [employer] Link access.    For providers and/or their staff who would like to refer a patient to [facility], please contact us through our one-stop-shop provider referral line, [facility], at [phone].    If you feel you have received this communication in error or would no longer like to receive these types of communications, please e-mail [email or web address]

## 2019-09-17 NOTE — LETTER
September 22, 2019      Joleen Varner MD  [address]  [facility] [address]           [facility] [address] [facility] [address]  [address], [address]  [facility] [address]  Phone: [phone]  Fax: [phone]          Patient: Kiana Ryder   MR Number: [id number]   YOB: 1985   Date of Visit: 9/17/2019       Dear Dr. Joleen Varner:    Thank you for referring Kiana Ryder to me for evaluation. Attached you will find relevant portions of my assessment and plan of care.    If you have questions, please do not hesitate to call me. I look forward to following Kiana Ryder along with you.    Sincerely,    Mary Iglesias MD    Enclosure  CC:  MD Aguilar Ramirez MD    If you would like to receive this communication electronically, please contact [email or web address] or [phone] to request more information on [employer] Link access.    For providers and/or their staff who would like to refer a patient to [facility], please contact us through our one-stop-shop provider referral line, [facility], at [phone].    If you feel you have received this communication in error or would no longer like to receive these types of communications, please e-mail [email or web address]

## 2019-09-19 ENCOUNTER — TELEPHONE (OUTPATIENT)
Dept: GYNECOLOGIC ONCOLOGY | Facility: CLINIC | Age: 34
End: 2019-09-19

## 2019-09-19 NOTE — TELEPHONE ENCOUNTER
Spoke with pt. Confirming her surgery for Tuesday 9/24, instructed pt to arrive at 1030 and to go to the same spot she went for her pre admit appt. Reminded pt to follow all surgery instructions she received at her pre admit appt. Pt verbalized understanding and denies any further questions at this time.

## 2019-09-20 ENCOUNTER — HOSPITAL ENCOUNTER (OUTPATIENT)
Dept: PREADMISSION TESTING | Facility: OTHER | Age: 34
Discharge: HOME | End: 2019-09-20
Attending: OBSTETRICS & GYNECOLOGY
Payer: MEDICAID

## 2019-09-20 ENCOUNTER — ANESTHESIA EVENT (OUTPATIENT)
Dept: SURGERY | Facility: OTHER | Age: 34
DRG: 818 | End: 2019-09-20
Payer: MEDICAID

## 2019-09-20 VITALS
WEIGHT: 167 LBS | DIASTOLIC BLOOD PRESSURE: 56 MMHG | SYSTOLIC BLOOD PRESSURE: 103 MMHG | HEART RATE: 105 BPM | BODY MASS INDEX: 28.51 KG/M2 | TEMPERATURE: 99 F | OXYGEN SATURATION: 95 % | HEIGHT: 64 IN

## 2019-09-20 RX ORDER — SODIUM CHLORIDE, SODIUM LACTATE, POTASSIUM CHLORIDE, CALCIUM CHLORIDE 600; 310; 30; 20 MG/100ML; MG/100ML; MG/100ML; MG/100ML
INJECTION, SOLUTION INTRAVENOUS CONTINUOUS
Status: CANCELLED | OUTPATIENT
Start: 2019-09-20

## 2019-09-20 RX ORDER — LIDOCAINE HYDROCHLORIDE 10 MG/ML
0.5 INJECTION, SOLUTION EPIDURAL; INFILTRATION; INTRACAUDAL; PERINEURAL ONCE
Status: CANCELLED | OUTPATIENT
Start: 2019-09-20 | End: 2019-09-20

## 2019-09-20 RX ORDER — ACETAMINOPHEN 500 MG
1000 TABLET ORAL
Status: CANCELLED | OUTPATIENT
Start: 2019-09-20 | End: 2019-09-20

## 2019-09-20 RX ORDER — FAMOTIDINE 20 MG/1
20 TABLET, FILM COATED ORAL
Status: CANCELLED | OUTPATIENT
Start: 2019-09-20 | End: 2019-09-20

## 2019-09-20 NOTE — DISCHARGE INSTRUCTIONS
PRE-ADMIT TESTING -  [phone]    [address]  [facility]          Your surgery has been scheduled at [facility]. We are pleased to have the opportunity to serve you. For Further Information please call [phone].    On the day of surgery please report to the Information Desk on the 1st floor.    · CONTACT YOUR PHYSICIAN'S OFFICE THE DAY PRIOR TO YOUR SURGERY TO OBTAIN YOUR ARRIVAL TIME.     · The evening before surgery do not eat anything after 9 p.m. ( this includes hard candy, chewing gum and mints).  You may only have GATORADE, POWERADE AND WATER  from 9 p.m. until you leave your home.   DO NOT DRINK ANY LIQUIDS ON THE WAY TO THE HOSPITAL.      SPECIAL MEDICATION INSTRUCTIONS: TAKE medications checked off by the Anesthesiologist on your Medication List.    Angiogram Patients: Take medications as instructed by your physician, including aspirin.     Surgery Patients:    If you take ASPIRIN - Your PHYSICIAN/SURGEON will need to inform you IF/OR when you need to stop taking aspirin prior to your surgery.     Do Not take any medications containing IBUPROFEN.  Do Not Wear any make-up or dark nail polish   (especially eye make-up) to surgery. If you come to surgery with makeup on you will be required to remove the makeup or nail polish.    Do not shave your surgical area at least 5 days prior to your surgery. The surgical prep will be performed at the hospital according to Infection Control regulations.    Leave all valuables at home.   Do Not wear any jewelry or watches, including any metal in body piercings. Jewelry must be removed prior to coming to the hospital.  There is a possibility that rings that are unable to be removed may be cut off if they are on the surgical extremity.    Contact Lens must be removed before surgery. Either do not wear the contact lens or bring a case and solution for storage.  Please bring a container for eyeglasses or dentures as required.  Bring  any paperwork your physician has provided, such as consent forms,  history and physicals, doctor's orders, etc.   Bring comfortable clothes that are loose fitting to wear upon discharge. Take into consideration the type of surgery being performed.  Maintain your diet as advised per your physician the day prior to surgery.      Adequate rest the night before surgery is advised.   Park in the Parking lot behind the hospital or in the [facility] across the street from the parking lot. Parking is complimentary.  If you will be discharged the same day as your procedure, please arrange for a responsible adult to drive you home or to accompany you if traveling by taxi.   YOU WILL NOT BE PERMITTED TO DRIVE OR TO LEAVE THE HOSPITAL ALONE AFTER SURGERY.   It is strongly recommended that you arrange for someone to remain with you for the first 24 hrs following your surgery.    The Surgeon will speak to your family/visitor after your surgery regarding the outcome of your surgery and post op care.  The Surgeon may speak to you after your surgery, but there is a possibility you may not remember the details.  Please check with your family members regarding the conversation with the Surgeon.    We strongly recommend whoever is bringing you home be present for discharge instructions.  This will ensure a thorough understanding for your post op home care.      Thank you for your cooperation.  The Staff of [facility].                Bathing Instructions with Hibiclens     Shower the evening before and morning of your procedure with Hibiclens:   Wash your face with water and your regular face wash/soap   Apply Hibiclens directly on your skin or on a wet washcloth and wash gently. When showering: Move away from the shower stream when applying Hibiclens to avoid rinsing off too soon.   Rinse thoroughly with warm water   Do not dilute Hibiclens         Dry off as usual, do not use any deodorant,  powder, body lotions, perfume, after shave or cologne.

## 2019-09-20 NOTE — ANESTHESIA PREPROCEDURE EVALUATION
09/20/2019  Kiana Ryder is a 34 y.o., female.    Anesthesia Evaluation    I have reviewed the Patient Summary Reports.    I have reviewed the Nursing Notes.   I have reviewed the Medications.     Review of Systems  Anesthesia Hx:  Denies Family Hx of Anesthesia complications.   Denies Personal Hx of Anesthesia complications.   Social:  Non-Smoker    Hematology/Oncology:     Oncology Normal    -- Anemia (hct 35):   EENT/Dental:EENT/Dental Normal   Cardiovascular:  Cardiovascular Normal     Pulmonary:  Pulmonary Normal    Renal/:  Renal/ Normal     Hepatic/GI:  Hepatic/GI Normal    Musculoskeletal:  Musculoskeletal Normal    OB/GYN/PEDS:  17 weeks pregnant with complex ovarian cyst 18x16 cm   Neurological:  Neurology Normal    Endocrine:   Diabetes, type 2, using insulin Hypothyroidism    Dermatological:  Skin Normal    Psych:  Psychiatric Normal           Physical Exam  General:  Well nourished    Airway/Jaw/Neck:  Airway Findings: Mouth Opening: Small, but > 3cm Mallampati: I  TM Distance: Normal, at least 6 cm  Jaw/Neck Findings:  Neck ROM: Normal ROM      Dental:  Dental Findings: In tact Rear broken/missing teeth, L lower loose        Mental Status:  Mental Status Findings:  Cooperative, Alert and Oriented         Anesthesia Plan  Type of Anesthesia, risks & benefits discussed:  Anesthesia Type:  general  Patient's Preference:   Intra-op Monitoring Plan: standard ASA monitors  Intra-op Monitoring Plan Comments:   Post Op Pain Control Plan: multimodal analgesia and per primary service following discharge from PACU  Post Op Pain Control Plan Comments:   Induction:   IV  Beta Blocker:         Informed Consent: Patient understands risks and agrees with Anesthesia plan.  Questions answered. Anesthesia consent signed with patient.  ASA Score: 2     Day of Surgery Review of History & Physical:    H&P  update referred to the surgeon.     Anesthesia Plan Notes: Recent labs in epic as above    Pt is pregnant        Ready For Surgery From Anesthesia Perspective.

## 2019-09-22 PROBLEM — R19.00 PELVIC MASS: Status: ACTIVE | Noted: 2019-09-22

## 2019-09-22 PROBLEM — Z34.90 PREGNANCY: Status: ACTIVE | Noted: 2019-09-22

## 2019-09-22 RX ORDER — LIDOCAINE HYDROCHLORIDE 10 MG/ML
1 INJECTION, SOLUTION EPIDURAL; INFILTRATION; INTRACAUDAL; PERINEURAL ONCE
Status: CANCELLED | OUTPATIENT
Start: 2019-09-22 | End: 2019-09-22

## 2019-09-22 RX ORDER — SODIUM CHLORIDE 9 MG/ML
INJECTION, SOLUTION INTRAVENOUS CONTINUOUS
Status: CANCELLED | OUTPATIENT
Start: 2019-09-22

## 2019-09-22 NOTE — H&P (VIEW-ONLY)
Subjective:      Patient ID: Kiana Ryder is a 34 y.o. female.    Chief Complaint: Ovarian Cyst      HPI  Referred same day by [facility] for large pelvic mass during pregnancy.     P1  Currently 16 4/7 wga, DORON 20.  [facility] US demonstrated 18cm complex right adnexal cyst, thick septations, with papillary projections.  She reports that at 10 weeks gestation she had an US which showed a 7cm ovarian cyst.     Prior abdominal surgeries include c/s x 1 and laparoscopic cholecystectomy.   Family history significant for MGM with breast cancer.  Medical comorbidities include hypothyroidism, DM. hgb A1c 6.8     Review of Systems   Constitutional: Negative for appetite change, chills, fatigue and fever.   HENT: Negative for mouth sores.    Respiratory: Negative for cough and shortness of breath.    Cardiovascular: Negative for leg swelling.   Gastrointestinal: Negative for abdominal pain, blood in stool, constipation and diarrhea.   Endocrine: Negative for cold intolerance.   Genitourinary: Negative for dysuria and vaginal bleeding.   Musculoskeletal: Negative for myalgias.   Skin: Negative for rash.   Allergic/Immunologic: Negative.    Neurological: Negative for weakness and numbness.   Hematological: Negative for adenopathy. Does not bruise/bleed easily.   Psychiatric/Behavioral: Negative for confusion.       Past Medical History:   Diagnosis Date    Diabetes mellitus     Pregnancy 2019    Thyroid disease     hypothyroidism     Past Surgical History:   Procedure Laterality Date     SECTION      CHOLECYSTECTOMY      DILATION AND CURETTAGE OF UTERUS      FOOT SURGERY Right     has a nail in the foot    TONSILLECTOMY       Family History   Problem Relation Age of Onset    COPD Mother      Social History     Socioeconomic History    Marital status:      Spouse name: Not on file    Number of children: Not on file    Years of education: Not on file    Highest education level: Not on file  "  Occupational History    Not on file   Social Needs    Financial resource strain: Not on file    Food insecurity:     Worry: Not on file     Inability: Not on file    Transportation needs:     Medical: Not on file     Non-medical: Not on file   Tobacco Use    Smoking status: Current Every Day Smoker     Packs/day: 0.50     Years: 15.00     Pack years: 7.50     Types: Cigarettes    Smokeless tobacco: Never Used   Substance and Sexual Activity    Alcohol use: Not Currently    Drug use: Yes     Types: Methamphetamines    Sexual activity: Yes     Partners: Male   Lifestyle    Physical activity:     Days per week: Not on file     Minutes per session: Not on file    Stress: Not on file   Relationships    Social connections:     Talks on phone: Not on file     Gets together: Not on file     Attends Evangelical service: Not on file     Active member of club or organization: Not on file     Attends meetings of clubs or organizations: Not on file     Relationship status: Not on file   Other Topics Concern    Not on file   Social History Narrative    Not on file     Current Outpatient Medications   Medication Sig    aspirin (ECOTRIN) 81 MG EC tablet Take 81 mg by mouth once daily.    BD INSULIN SYRINGE ULTRA-FINE 0.5 mL 31 gauge x 5/16" Syrg     CLASSIC PRENATAL 28 mg iron- 800 mcg Tab Take 1 tablet by mouth once daily.    HUMALOG U-100 INSULIN 100 unit/mL injection Inject 5 Units into the skin 3 (three) times daily before meals.     levothyroxine (SYNTHROID) 25 MCG tablet Take 25 mcg by mouth once daily.    metformin (GLUCOPHAGE) 500 MG tablet Take 500 mg by mouth 2 (two) times daily with meals.    TRUE METRIX GLUCOSE TEST STRIP Strp     TRUEPLUS LANCETS 28 gauge Misc      No current facility-administered medications for this visit.      Review of patient's allergies indicates:  No Known Allergies    Objective:   Physical Exam:   Constitutional: She is oriented to person, place, and time. She appears " well-developed and well-nourished.    HENT:   Head: Normocephalic and atraumatic.    Eyes: Pupils are equal, round, and reactive to light. EOM are normal.    Neck: Normal range of motion. Neck supple. No thyromegaly present.    Cardiovascular: Normal rate, regular rhythm and intact distal pulses.     Pulmonary/Chest: Effort normal and breath sounds normal. No respiratory distress. She has no wheezes.        Abdominal: Soft. Bowel sounds are normal. She exhibits mass. She exhibits no distension and no ascites. There is no tenderness.   Gravid uterus  Palpable abdominopelvic mass int he RLQ of the abdomen.              Musculoskeletal: Normal range of motion and moves all extremeties.      Lymphadenopathy:     She has no cervical adenopathy.        Right: No supraclavicular adenopathy present.        Left: No supraclavicular adenopathy present.    Neurological: She is alert and oriented to person, place, and time.    Skin: Skin is warm and dry. No rash noted.    Psychiatric: She has a normal mood and affect.       Assessment:     1. Pregnancy, unspecified gestational age    2. Pelvic mass        Plan:   No orders of the defined types were placed in this encounter.      Discussed with the patient and her friend who was present with her at today's visit the differential diagnosis of pelvic mass during pregnancy including benign, borderline, and malignant process. Given the rapid enlargement and complex features I have recommended surgical resection and discussed this with our MFM team who are in agreement. Tumor markers are less reliable in pregnancy. The ideal operative window is this gestational age. Will need to be approached via midline laparotomy given size. Discussed xlap/USO/possible staging/any other indicated procedures. She desires to proceed. The risks, benefits, and indications of the procedure were discussed with the patient and her family members if present. These included bleeding, transfusion, infection,  damage to surrounding tissues (bowel, bladder, ureter), wound separation, lymphedema, conversion to laparotomy if laparoscopic, perioperative cardiac events, VTE, pneumonia, and possible death.  We also discussed possibility of fetal loss. She voiced understanding, all questions were answered and consents were signed.    Surgery 9/24/19 [facility]  Pre op anesthesia

## 2019-09-22 NOTE — PROGRESS NOTES
Subjective:      Patient ID: Kiana Ryder is a 34 y.o. female.    Chief Complaint: Ovarian Cyst      HPI  Referred same day by [facility] for large pelvic mass during pregnancy.     P1  Currently 16 4/7 wga, DORON 20.  [facility] US demonstrated 18cm complex right adnexal cyst, thick septations, with papillary projections.  She reports that at 10 weeks gestation she had an US which showed a 7cm ovarian cyst.     Prior abdominal surgeries include c/s x 1 and laparoscopic cholecystectomy.   Family history significant for MGM with breast cancer.  Medical comorbidities include hypothyroidism, DM. hgb A1c 6.8     Review of Systems   Constitutional: Negative for appetite change, chills, fatigue and fever.   HENT: Negative for mouth sores.    Respiratory: Negative for cough and shortness of breath.    Cardiovascular: Negative for leg swelling.   Gastrointestinal: Negative for abdominal pain, blood in stool, constipation and diarrhea.   Endocrine: Negative for cold intolerance.   Genitourinary: Negative for dysuria and vaginal bleeding.   Musculoskeletal: Negative for myalgias.   Skin: Negative for rash.   Allergic/Immunologic: Negative.    Neurological: Negative for weakness and numbness.   Hematological: Negative for adenopathy. Does not bruise/bleed easily.   Psychiatric/Behavioral: Negative for confusion.       Past Medical History:   Diagnosis Date    Diabetes mellitus     Pregnancy 2019    Thyroid disease     hypothyroidism     Past Surgical History:   Procedure Laterality Date     SECTION      CHOLECYSTECTOMY      DILATION AND CURETTAGE OF UTERUS      FOOT SURGERY Right     has a nail in the foot    TONSILLECTOMY       Family History   Problem Relation Age of Onset    COPD Mother      Social History     Socioeconomic History    Marital status:      Spouse name: Not on file    Number of children: Not on file    Years of education: Not on file    Highest education level: Not on file  "  Occupational History    Not on file   Social Needs    Financial resource strain: Not on file    Food insecurity:     Worry: Not on file     Inability: Not on file    Transportation needs:     Medical: Not on file     Non-medical: Not on file   Tobacco Use    Smoking status: Current Every Day Smoker     Packs/day: 0.50     Years: 15.00     Pack years: 7.50     Types: Cigarettes    Smokeless tobacco: Never Used   Substance and Sexual Activity    Alcohol use: Not Currently    Drug use: Yes     Types: Methamphetamines    Sexual activity: Yes     Partners: Male   Lifestyle    Physical activity:     Days per week: Not on file     Minutes per session: Not on file    Stress: Not on file   Relationships    Social connections:     Talks on phone: Not on file     Gets together: Not on file     Attends Confucianism service: Not on file     Active member of club or organization: Not on file     Attends meetings of clubs or organizations: Not on file     Relationship status: Not on file   Other Topics Concern    Not on file   Social History Narrative    Not on file     Current Outpatient Medications   Medication Sig    aspirin (ECOTRIN) 81 MG EC tablet Take 81 mg by mouth once daily.    BD INSULIN SYRINGE ULTRA-FINE 0.5 mL 31 gauge x 5/16" Syrg     CLASSIC PRENATAL 28 mg iron- 800 mcg Tab Take 1 tablet by mouth once daily.    HUMALOG U-100 INSULIN 100 unit/mL injection Inject 5 Units into the skin 3 (three) times daily before meals.     levothyroxine (SYNTHROID) 25 MCG tablet Take 25 mcg by mouth once daily.    metformin (GLUCOPHAGE) 500 MG tablet Take 500 mg by mouth 2 (two) times daily with meals.    TRUE METRIX GLUCOSE TEST STRIP Strp     TRUEPLUS LANCETS 28 gauge Misc      No current facility-administered medications for this visit.      Review of patient's allergies indicates:  No Known Allergies    Objective:   Physical Exam:   Constitutional: She is oriented to person, place, and time. She appears " well-developed and well-nourished.    HENT:   Head: Normocephalic and atraumatic.    Eyes: Pupils are equal, round, and reactive to light. EOM are normal.    Neck: Normal range of motion. Neck supple. No thyromegaly present.    Cardiovascular: Normal rate, regular rhythm and intact distal pulses.     Pulmonary/Chest: Effort normal and breath sounds normal. No respiratory distress. She has no wheezes.        Abdominal: Soft. Bowel sounds are normal. She exhibits mass. She exhibits no distension and no ascites. There is no tenderness.   Gravid uterus  Palpable abdominopelvic mass int he RLQ of the abdomen.              Musculoskeletal: Normal range of motion and moves all extremeties.      Lymphadenopathy:     She has no cervical adenopathy.        Right: No supraclavicular adenopathy present.        Left: No supraclavicular adenopathy present.    Neurological: She is alert and oriented to person, place, and time.    Skin: Skin is warm and dry. No rash noted.    Psychiatric: She has a normal mood and affect.       Assessment:     1. Pregnancy, unspecified gestational age    2. Pelvic mass        Plan:   No orders of the defined types were placed in this encounter.      Discussed with the patient and her friend who was present with her at today's visit the differential diagnosis of pelvic mass during pregnancy including benign, borderline, and malignant process. Given the rapid enlargement and complex features I have recommended surgical resection and discussed this with our MFM team who are in agreement. Tumor markers are less reliable in pregnancy. The ideal operative window is this gestational age. Will need to be approached via midline laparotomy given size. Discussed xlap/USO/possible staging/any other indicated procedures. She desires to proceed. The risks, benefits, and indications of the procedure were discussed with the patient and her family members if present. These included bleeding, transfusion, infection,  damage to surrounding tissues (bowel, bladder, ureter), wound separation, lymphedema, conversion to laparotomy if laparoscopic, perioperative cardiac events, VTE, pneumonia, and possible death.  We also discussed possibility of fetal loss. She voiced understanding, all questions were answered and consents were signed.    Surgery 9/24/19 [facility]  Pre op anesthesia

## 2019-09-24 ENCOUNTER — HOSPITAL ENCOUNTER (INPATIENT)
Facility: OTHER | Age: 34
LOS: 2 days | Discharge: HOME | DRG: 818 | End: 2019-09-26
Attending: OBSTETRICS & GYNECOLOGY | Admitting: OBSTETRICS & GYNECOLOGY
Payer: MEDICAID

## 2019-09-24 ENCOUNTER — ANESTHESIA (OUTPATIENT)
Dept: SURGERY | Facility: OTHER | Age: 34
DRG: 818 | End: 2019-09-24
Payer: MEDICAID

## 2019-09-24 DIAGNOSIS — R19.00 PELVIC MASS: Primary | ICD-10-CM

## 2019-09-24 DIAGNOSIS — Z90.721 S/P RIGHT OOPHORECTOMY: ICD-10-CM

## 2019-09-24 DIAGNOSIS — Z34.90 PREGNANCY, UNSPECIFIED GESTATIONAL AGE: ICD-10-CM

## 2019-09-24 LAB
ABO + RH BLD: NORMAL
BLD GP AB SCN CELLS X3 SERPL QL: NORMAL
POCT GLUCOSE: 117 MG/DL (ref 70–110)
POCT GLUCOSE: 174 MG/DL (ref 70–110)

## 2019-09-24 PROCEDURE — 88305 TISSUE EXAM BY PATHOLOGIST: CPT | Performed by: PATHOLOGY

## 2019-09-24 PROCEDURE — 71000039 HC RECOVERY, EACH ADD'L HOUR: Performed by: OBSTETRICS & GYNECOLOGY

## 2019-09-24 PROCEDURE — 11000001 HC ACUTE MED/SURG PRIVATE ROOM

## 2019-09-24 PROCEDURE — 63600175 PHARM REV CODE 636 W HCPCS: Performed by: OBSTETRICS & GYNECOLOGY

## 2019-09-24 PROCEDURE — 37000009 HC ANESTHESIA EA ADD 15 MINS: Performed by: OBSTETRICS & GYNECOLOGY

## 2019-09-24 PROCEDURE — 88112 CYTOPATH CELL ENHANCE TECH: CPT | Mod: 26,,, | Performed by: PATHOLOGY

## 2019-09-24 PROCEDURE — 88331 PATH CONSLTJ SURG 1 BLK 1SPC: CPT | Mod: 26,,, | Performed by: PATHOLOGY

## 2019-09-24 PROCEDURE — 36000708 HC OR TIME LEV III 1ST 15 MIN: Performed by: OBSTETRICS & GYNECOLOGY

## 2019-09-24 PROCEDURE — 88305 TISSUE EXAM BY PATHOLOGIST: CPT | Mod: 26,,, | Performed by: PATHOLOGY

## 2019-09-24 PROCEDURE — 49205 PR EXCISION/DESTRUCTION OPEN ABDOMINAL TUMORS >10.0 CM: CPT | Mod: ,,, | Performed by: OBSTETRICS & GYNECOLOGY

## 2019-09-24 PROCEDURE — 25000003 PHARM REV CODE 250: Performed by: SPECIALIST

## 2019-09-24 PROCEDURE — 25000003 PHARM REV CODE 250: Performed by: OBSTETRICS & GYNECOLOGY

## 2019-09-24 PROCEDURE — 88307 TISSUE EXAM BY PATHOLOGIST: CPT | Mod: 26,,, | Performed by: PATHOLOGY

## 2019-09-24 PROCEDURE — 88331 TISSUE SPECIMEN TO PATHOLOGY - SURGERY: ICD-10-PCS | Mod: 26,,, | Performed by: PATHOLOGY

## 2019-09-24 PROCEDURE — 25000003 PHARM REV CODE 250: Performed by: ANESTHESIOLOGY

## 2019-09-24 PROCEDURE — 86850 RBC ANTIBODY SCREEN: CPT

## 2019-09-24 PROCEDURE — 63600175 PHARM REV CODE 636 W HCPCS: Performed by: SPECIALIST

## 2019-09-24 PROCEDURE — 63600175 PHARM REV CODE 636 W HCPCS: Performed by: ANESTHESIOLOGY

## 2019-09-24 PROCEDURE — 82962 GLUCOSE BLOOD TEST: CPT | Performed by: OBSTETRICS & GYNECOLOGY

## 2019-09-24 PROCEDURE — 88112 CYTOLOGY SPECIMEN- MEDICAL CYTOLOGY (FLUID/WASH/BRUSH): ICD-10-PCS | Mod: 26,,, | Performed by: PATHOLOGY

## 2019-09-24 PROCEDURE — 63600175 PHARM REV CODE 636 W HCPCS: Performed by: STUDENT IN AN ORGANIZED HEALTH CARE EDUCATION/TRAINING PROGRAM

## 2019-09-24 PROCEDURE — 49205 PR EXCISION/DESTRUCTION OPEN ABDOMINAL TUMORS >10.0 CM: ICD-10-PCS | Mod: ,,, | Performed by: OBSTETRICS & GYNECOLOGY

## 2019-09-24 PROCEDURE — 25000003 PHARM REV CODE 250: Performed by: STUDENT IN AN ORGANIZED HEALTH CARE EDUCATION/TRAINING PROGRAM

## 2019-09-24 PROCEDURE — P9045 ALBUMIN (HUMAN), 5%, 250 ML: HCPCS | Mod: JG | Performed by: NURSE ANESTHETIST, CERTIFIED REGISTERED

## 2019-09-24 PROCEDURE — 36000709 HC OR TIME LEV III EA ADD 15 MIN: Performed by: OBSTETRICS & GYNECOLOGY

## 2019-09-24 PROCEDURE — 88331 PATH CONSLTJ SURG 1 BLK 1SPC: CPT | Performed by: PATHOLOGY

## 2019-09-24 PROCEDURE — 94761 N-INVAS EAR/PLS OXIMETRY MLT: CPT

## 2019-09-24 PROCEDURE — 63600175 PHARM REV CODE 636 W HCPCS: Performed by: NURSE ANESTHETIST, CERTIFIED REGISTERED

## 2019-09-24 PROCEDURE — 88305 CYTOLOGY SPECIMEN- MEDICAL CYTOLOGY (FLUID/WASH/BRUSH): ICD-10-PCS | Mod: 26,,, | Performed by: PATHOLOGY

## 2019-09-24 PROCEDURE — 94799 UNLISTED PULMONARY SVC/PX: CPT

## 2019-09-24 PROCEDURE — 36415 COLL VENOUS BLD VENIPUNCTURE: CPT

## 2019-09-24 PROCEDURE — 88307 TISSUE SPECIMEN TO PATHOLOGY - SURGERY: ICD-10-PCS | Mod: 26,,, | Performed by: PATHOLOGY

## 2019-09-24 PROCEDURE — 71000033 HC RECOVERY, INTIAL HOUR: Performed by: OBSTETRICS & GYNECOLOGY

## 2019-09-24 PROCEDURE — 37000008 HC ANESTHESIA 1ST 15 MINUTES: Performed by: OBSTETRICS & GYNECOLOGY

## 2019-09-24 PROCEDURE — 25000003 PHARM REV CODE 250: Performed by: NURSE ANESTHETIST, CERTIFIED REGISTERED

## 2019-09-24 RX ORDER — INDOMETHACIN 50 MG/1
50 SUPPOSITORY RECTAL EVERY 6 HOURS
Status: DISCONTINUED | OUTPATIENT
Start: 2019-09-24 | End: 2019-09-24 | Stop reason: SDUPTHER

## 2019-09-24 RX ORDER — OXYCODONE HYDROCHLORIDE 5 MG/1
5 TABLET ORAL
Status: DISCONTINUED | OUTPATIENT
Start: 2019-09-24 | End: 2019-09-24 | Stop reason: HOSPADM

## 2019-09-24 RX ORDER — MEPERIDINE HYDROCHLORIDE 25 MG/ML
12.5 INJECTION INTRAMUSCULAR; INTRAVENOUS; SUBCUTANEOUS ONCE AS NEEDED
Status: DISCONTINUED | OUTPATIENT
Start: 2019-09-24 | End: 2019-09-24 | Stop reason: HOSPADM

## 2019-09-24 RX ORDER — ACETAMINOPHEN 325 MG/1
650 TABLET ORAL EVERY 6 HOURS PRN
Status: DISCONTINUED | OUTPATIENT
Start: 2019-09-24 | End: 2019-09-26 | Stop reason: HOSPADM

## 2019-09-24 RX ORDER — DIPHENHYDRAMINE HCL 25 MG
25 CAPSULE ORAL EVERY 4 HOURS PRN
Status: DISCONTINUED | OUTPATIENT
Start: 2019-09-24 | End: 2019-09-26 | Stop reason: HOSPADM

## 2019-09-24 RX ORDER — IBUPROFEN 200 MG
16 TABLET ORAL
Status: DISCONTINUED | OUTPATIENT
Start: 2019-09-24 | End: 2019-09-26 | Stop reason: HOSPADM

## 2019-09-24 RX ORDER — BISACODYL 10 MG
10 SUPPOSITORY, RECTAL RECTAL DAILY PRN
Status: DISCONTINUED | OUTPATIENT
Start: 2019-09-24 | End: 2019-09-26 | Stop reason: HOSPADM

## 2019-09-24 RX ORDER — EPHEDRINE SULFATE 50 MG/ML
INJECTION, SOLUTION INTRAVENOUS
Status: DISCONTINUED | OUTPATIENT
Start: 2019-09-24 | End: 2019-09-24

## 2019-09-24 RX ORDER — SUCCINYLCHOLINE CHLORIDE 20 MG/ML
INJECTION INTRAMUSCULAR; INTRAVENOUS
Status: DISCONTINUED | OUTPATIENT
Start: 2019-09-24 | End: 2019-09-24

## 2019-09-24 RX ORDER — AMOXICILLIN 250 MG
1 CAPSULE ORAL 2 TIMES DAILY
Status: DISCONTINUED | OUTPATIENT
Start: 2019-09-24 | End: 2019-09-26 | Stop reason: HOSPADM

## 2019-09-24 RX ORDER — ROCURONIUM BROMIDE 10 MG/ML
INJECTION, SOLUTION INTRAVENOUS
Status: DISCONTINUED | OUTPATIENT
Start: 2019-09-24 | End: 2019-09-24

## 2019-09-24 RX ORDER — LEVOTHYROXINE SODIUM 25 UG/1
25 TABLET ORAL
Status: DISCONTINUED | OUTPATIENT
Start: 2019-09-25 | End: 2019-09-26 | Stop reason: HOSPADM

## 2019-09-24 RX ORDER — LIDOCAINE HYDROCHLORIDE 10 MG/ML
0.5 INJECTION, SOLUTION EPIDURAL; INFILTRATION; INTRACAUDAL; PERINEURAL ONCE
Status: DISCONTINUED | OUTPATIENT
Start: 2019-09-24 | End: 2019-09-24 | Stop reason: HOSPADM

## 2019-09-24 RX ORDER — GLUCAGON 1 MG
1 KIT INJECTION
Status: DISCONTINUED | OUTPATIENT
Start: 2019-09-24 | End: 2019-09-26 | Stop reason: HOSPADM

## 2019-09-24 RX ORDER — OXYCODONE HYDROCHLORIDE 5 MG/1
5 TABLET ORAL EVERY 4 HOURS PRN
Status: DISCONTINUED | OUTPATIENT
Start: 2019-09-24 | End: 2019-09-26

## 2019-09-24 RX ORDER — ONDANSETRON 8 MG/1
8 TABLET, ORALLY DISINTEGRATING ORAL EVERY 8 HOURS PRN
Status: DISCONTINUED | OUTPATIENT
Start: 2019-09-24 | End: 2019-09-26 | Stop reason: HOSPADM

## 2019-09-24 RX ORDER — DIPHENHYDRAMINE HYDROCHLORIDE 50 MG/ML
25 INJECTION INTRAMUSCULAR; INTRAVENOUS EVERY 4 HOURS PRN
Status: DISCONTINUED | OUTPATIENT
Start: 2019-09-24 | End: 2019-09-26 | Stop reason: HOSPADM

## 2019-09-24 RX ORDER — INDOMETHACIN 50 MG/1
SUPPOSITORY RECTAL
Status: DISCONTINUED | OUTPATIENT
Start: 2019-09-24 | End: 2019-09-24 | Stop reason: HOSPADM

## 2019-09-24 RX ORDER — LIDOCAINE HYDROCHLORIDE 10 MG/ML
1 INJECTION, SOLUTION EPIDURAL; INFILTRATION; INTRACAUDAL; PERINEURAL ONCE
Status: DISCONTINUED | OUTPATIENT
Start: 2019-09-24 | End: 2019-09-24 | Stop reason: HOSPADM

## 2019-09-24 RX ORDER — ENOXAPARIN SODIUM 100 MG/ML
40 INJECTION SUBCUTANEOUS EVERY 24 HOURS
Status: DISCONTINUED | OUTPATIENT
Start: 2019-09-25 | End: 2019-09-26 | Stop reason: HOSPADM

## 2019-09-24 RX ORDER — ACETAMINOPHEN 500 MG
1000 TABLET ORAL
Status: COMPLETED | OUTPATIENT
Start: 2019-09-24 | End: 2019-09-24

## 2019-09-24 RX ORDER — FENTANYL CITRATE 50 UG/ML
INJECTION, SOLUTION INTRAMUSCULAR; INTRAVENOUS
Status: DISCONTINUED | OUTPATIENT
Start: 2019-09-24 | End: 2019-09-24

## 2019-09-24 RX ORDER — ALBUMIN HUMAN 50 G/1000ML
SOLUTION INTRAVENOUS CONTINUOUS PRN
Status: DISCONTINUED | OUTPATIENT
Start: 2019-09-24 | End: 2019-09-24

## 2019-09-24 RX ORDER — INDOMETHACIN 50 MG/1
50 SUPPOSITORY RECTAL ONCE
Status: DISCONTINUED | OUTPATIENT
Start: 2019-09-24 | End: 2019-09-25

## 2019-09-24 RX ORDER — SODIUM CHLORIDE, SODIUM LACTATE, POTASSIUM CHLORIDE, CALCIUM CHLORIDE 600; 310; 30; 20 MG/100ML; MG/100ML; MG/100ML; MG/100ML
INJECTION, SOLUTION INTRAVENOUS CONTINUOUS
Status: DISCONTINUED | OUTPATIENT
Start: 2019-09-24 | End: 2019-09-24

## 2019-09-24 RX ORDER — MUPIROCIN 20 MG/G
1 OINTMENT TOPICAL 2 TIMES DAILY
Status: DISCONTINUED | OUTPATIENT
Start: 2019-09-24 | End: 2019-09-26 | Stop reason: HOSPADM

## 2019-09-24 RX ORDER — NEOSTIGMINE METHYLSULFATE 1 MG/ML
INJECTION, SOLUTION INTRAVENOUS
Status: DISCONTINUED | OUTPATIENT
Start: 2019-09-24 | End: 2019-09-24

## 2019-09-24 RX ORDER — IBUPROFEN 200 MG
24 TABLET ORAL
Status: DISCONTINUED | OUTPATIENT
Start: 2019-09-24 | End: 2019-09-26 | Stop reason: HOSPADM

## 2019-09-24 RX ORDER — GLYCOPYRROLATE 0.2 MG/ML
INJECTION INTRAMUSCULAR; INTRAVENOUS
Status: DISCONTINUED | OUTPATIENT
Start: 2019-09-24 | End: 2019-09-24

## 2019-09-24 RX ORDER — HYDROMORPHONE HYDROCHLORIDE 2 MG/ML
0.4 INJECTION, SOLUTION INTRAMUSCULAR; INTRAVENOUS; SUBCUTANEOUS EVERY 5 MIN PRN
Status: DISCONTINUED | OUTPATIENT
Start: 2019-09-24 | End: 2019-09-24 | Stop reason: HOSPADM

## 2019-09-24 RX ORDER — DEXAMETHASONE SODIUM PHOSPHATE 4 MG/ML
INJECTION, SOLUTION INTRA-ARTICULAR; INTRALESIONAL; INTRAMUSCULAR; INTRAVENOUS; SOFT TISSUE
Status: DISCONTINUED | OUTPATIENT
Start: 2019-09-24 | End: 2019-09-24

## 2019-09-24 RX ORDER — ONDANSETRON 2 MG/ML
4 INJECTION INTRAMUSCULAR; INTRAVENOUS DAILY PRN
Status: DISCONTINUED | OUTPATIENT
Start: 2019-09-24 | End: 2019-09-24 | Stop reason: HOSPADM

## 2019-09-24 RX ORDER — INSULIN ASPART 100 [IU]/ML
1-10 INJECTION, SOLUTION INTRAVENOUS; SUBCUTANEOUS
Status: DISCONTINUED | OUTPATIENT
Start: 2019-09-24 | End: 2019-09-26 | Stop reason: HOSPADM

## 2019-09-24 RX ORDER — SODIUM CHLORIDE 0.9 % (FLUSH) 0.9 %
3 SYRINGE (ML) INJECTION
Status: DISCONTINUED | OUTPATIENT
Start: 2019-09-24 | End: 2019-09-26 | Stop reason: HOSPADM

## 2019-09-24 RX ORDER — CEFAZOLIN SODIUM 1 G/3ML
2 INJECTION, POWDER, FOR SOLUTION INTRAMUSCULAR; INTRAVENOUS
Status: COMPLETED | OUTPATIENT
Start: 2019-09-24 | End: 2019-09-24

## 2019-09-24 RX ORDER — LIDOCAINE HCL/PF 100 MG/5ML
SYRINGE (ML) INTRAVENOUS
Status: DISCONTINUED | OUTPATIENT
Start: 2019-09-24 | End: 2019-09-24

## 2019-09-24 RX ORDER — PROPOFOL 10 MG/ML
VIAL (ML) INTRAVENOUS
Status: DISCONTINUED | OUTPATIENT
Start: 2019-09-24 | End: 2019-09-24

## 2019-09-24 RX ORDER — FAMOTIDINE 20 MG/1
20 TABLET, FILM COATED ORAL 2 TIMES DAILY
Status: DISCONTINUED | OUTPATIENT
Start: 2019-09-24 | End: 2019-09-26 | Stop reason: HOSPADM

## 2019-09-24 RX ORDER — SODIUM CHLORIDE 9 MG/ML
INJECTION, SOLUTION INTRAVENOUS CONTINUOUS
Status: DISCONTINUED | OUTPATIENT
Start: 2019-09-24 | End: 2019-09-24

## 2019-09-24 RX ORDER — HYDROMORPHONE HYDROCHLORIDE 1 MG/ML
1 INJECTION, SOLUTION INTRAMUSCULAR; INTRAVENOUS; SUBCUTANEOUS EVERY 4 HOURS PRN
Status: DISCONTINUED | OUTPATIENT
Start: 2019-09-24 | End: 2019-09-26

## 2019-09-24 RX ORDER — ONDANSETRON 2 MG/ML
INJECTION INTRAMUSCULAR; INTRAVENOUS
Status: DISCONTINUED | OUTPATIENT
Start: 2019-09-24 | End: 2019-09-24

## 2019-09-24 RX ORDER — OXYCODONE HYDROCHLORIDE 5 MG/1
5 TABLET ORAL EVERY 4 HOURS PRN
Status: DISCONTINUED | OUTPATIENT
Start: 2019-09-24 | End: 2019-09-26 | Stop reason: HOSPADM

## 2019-09-24 RX ORDER — FAMOTIDINE 20 MG/1
20 TABLET, FILM COATED ORAL
Status: COMPLETED | OUTPATIENT
Start: 2019-09-24 | End: 2019-09-24

## 2019-09-24 RX ORDER — INDOMETHACIN 50 MG/1
50 SUPPOSITORY RECTAL EVERY 6 HOURS
Status: DISCONTINUED | OUTPATIENT
Start: 2019-09-24 | End: 2019-09-25 | Stop reason: ALTCHOICE

## 2019-09-24 RX ORDER — DIPHENHYDRAMINE HYDROCHLORIDE 50 MG/ML
25 INJECTION INTRAMUSCULAR; INTRAVENOUS EVERY 6 HOURS PRN
Status: DISCONTINUED | OUTPATIENT
Start: 2019-09-24 | End: 2019-09-24 | Stop reason: HOSPADM

## 2019-09-24 RX ADMIN — MUPIROCIN 1 G: 20 OINTMENT TOPICAL at 08:09

## 2019-09-24 RX ADMIN — ROCURONIUM BROMIDE 35 MG: 10 INJECTION, SOLUTION INTRAVENOUS at 01:09

## 2019-09-24 RX ADMIN — SODIUM CHLORIDE, SODIUM LACTATE, POTASSIUM CHLORIDE, AND CALCIUM CHLORIDE: 600; 310; 30; 20 INJECTION, SOLUTION INTRAVENOUS at 11:09

## 2019-09-24 RX ADMIN — EPHEDRINE SULFATE 10 MG: 50 INJECTION INTRAMUSCULAR; INTRAVENOUS; SUBCUTANEOUS at 01:09

## 2019-09-24 RX ADMIN — SODIUM CHLORIDE, SODIUM LACTATE, POTASSIUM CHLORIDE, AND CALCIUM CHLORIDE: 600; 310; 30; 20 INJECTION, SOLUTION INTRAVENOUS at 02:09

## 2019-09-24 RX ADMIN — ACETAMINOPHEN 1000 MG: 500 TABLET, FILM COATED ORAL at 10:09

## 2019-09-24 RX ADMIN — OXYCODONE HYDROCHLORIDE 5 MG: 5 TABLET ORAL at 08:09

## 2019-09-24 RX ADMIN — PROPOFOL 200 MG: 10 INJECTION, EMULSION INTRAVENOUS at 12:09

## 2019-09-24 RX ADMIN — CEFAZOLIN 2 G: 330 INJECTION, POWDER, FOR SOLUTION INTRAMUSCULAR; INTRAVENOUS at 01:09

## 2019-09-24 RX ADMIN — PROMETHAZINE HYDROCHLORIDE 12.5 MG: 25 INJECTION INTRAMUSCULAR; INTRAVENOUS at 05:09

## 2019-09-24 RX ADMIN — GLYCOPYRROLATE 0.6 MG: 0.2 INJECTION, SOLUTION INTRAMUSCULAR; INTRAVENOUS at 02:09

## 2019-09-24 RX ADMIN — ROCURONIUM BROMIDE 10 MG: 10 INJECTION, SOLUTION INTRAVENOUS at 01:09

## 2019-09-24 RX ADMIN — ONDANSETRON 4 MG: 2 INJECTION INTRAMUSCULAR; INTRAVENOUS at 02:09

## 2019-09-24 RX ADMIN — CARBOXYMETHYLCELLULOSE SODIUM 2 DROP: 2.5 SOLUTION/ DROPS OPHTHALMIC at 12:09

## 2019-09-24 RX ADMIN — ALBUMIN (HUMAN): 12.5 SOLUTION INTRAVENOUS at 01:09

## 2019-09-24 RX ADMIN — EPHEDRINE SULFATE 10 MG: 50 INJECTION INTRAMUSCULAR; INTRAVENOUS; SUBCUTANEOUS at 02:09

## 2019-09-24 RX ADMIN — HYDROMORPHONE HYDROCHLORIDE 0.4 MG: 2 INJECTION, SOLUTION INTRAMUSCULAR; INTRAVENOUS; SUBCUTANEOUS at 03:09

## 2019-09-24 RX ADMIN — OXYCODONE HYDROCHLORIDE 5 MG: 5 TABLET ORAL at 03:09

## 2019-09-24 RX ADMIN — ROCURONIUM BROMIDE 5 MG: 10 INJECTION, SOLUTION INTRAVENOUS at 12:09

## 2019-09-24 RX ADMIN — FENTANYL CITRATE 50 MCG: 50 INJECTION, SOLUTION INTRAMUSCULAR; INTRAVENOUS at 01:09

## 2019-09-24 RX ADMIN — NEOSTIGMINE METHYLSULFATE 5 MG: 1 INJECTION INTRAVENOUS at 02:09

## 2019-09-24 RX ADMIN — LIDOCAINE HYDROCHLORIDE 60 MG: 20 INJECTION, SOLUTION INTRAVENOUS at 12:09

## 2019-09-24 RX ADMIN — FENTANYL CITRATE 50 MCG: 50 INJECTION, SOLUTION INTRAMUSCULAR; INTRAVENOUS at 02:09

## 2019-09-24 RX ADMIN — HYDROMORPHONE HYDROCHLORIDE 0.4 MG: 2 INJECTION, SOLUTION INTRAMUSCULAR; INTRAVENOUS; SUBCUTANEOUS at 02:09

## 2019-09-24 RX ADMIN — FENTANYL CITRATE 100 MCG: 50 INJECTION, SOLUTION INTRAMUSCULAR; INTRAVENOUS at 12:09

## 2019-09-24 RX ADMIN — HYDROMORPHONE HYDROCHLORIDE 1 MG: 1 INJECTION, SOLUTION INTRAMUSCULAR; INTRAVENOUS; SUBCUTANEOUS at 11:09

## 2019-09-24 RX ADMIN — SUCCINYLCHOLINE CHLORIDE 160 MG: 20 INJECTION, SOLUTION INTRAMUSCULAR; INTRAVENOUS at 12:09

## 2019-09-24 RX ADMIN — INSULIN ASPART 2 UNITS: 100 INJECTION, SOLUTION INTRAVENOUS; SUBCUTANEOUS at 09:09

## 2019-09-24 RX ADMIN — FAMOTIDINE 20 MG: 20 TABLET, FILM COATED ORAL at 10:09

## 2019-09-24 RX ADMIN — DEXAMETHASONE SODIUM PHOSPHATE 4 MG: 4 INJECTION, SOLUTION INTRAMUSCULAR; INTRAVENOUS at 02:09

## 2019-09-24 RX ADMIN — HYDROMORPHONE HYDROCHLORIDE 1 MG: 1 INJECTION, SOLUTION INTRAMUSCULAR; INTRAVENOUS; SUBCUTANEOUS at 05:09

## 2019-09-24 RX ADMIN — SENNOSIDES,DOCUSATE SODIUM 1 TABLET: 8.6; 5 TABLET, FILM COATED ORAL at 08:09

## 2019-09-24 RX ADMIN — FAMOTIDINE 20 MG: 20 TABLET, FILM COATED ORAL at 08:09

## 2019-09-24 NOTE — INTERVAL H&P NOTE
The patient has been examined and the H&P has been reviewed:    I concur with the findings and no changes have occurred since H&P was written.    Anesthesia/Surgery risks, benefits and alternative options discussed and understood by patient/family.    FHT established at 140-150 BPM    Will plan for indocin 50mg rectal in preop then 50 mg q6 post op for 1-2 days per [facility]       Active Hospital Problems    Diagnosis  POA    Pregnancy [Z34.90]  Not Applicable      Resolved Hospital Problems   No resolved problems to display.

## 2019-09-24 NOTE — CARE UPDATE
FHT established at 154 BPM post operatively    Leonardo Salgado MD  OB/GYN PGY-4  Pager: [phone]

## 2019-09-24 NOTE — NURSING
Rec'd to room 382 via stretcher, no acute distress noted. Resp even and unlabored on o2@2l per nc. Drowsy but easily arouses, AAOx4. Midline abd incision noted with island dressing CDI. Cloud patent and draining clear yellow urine. Oriented to room and call system. Initiated SCD's and IVF's. Bed in lowest locked position, side rails elevated, cb in reach. Will cont to monitor.

## 2019-09-24 NOTE — BRIEF OP NOTE
[facility]  Brief Operative Note    SUMMARY     Surgery Date: 9/24/2019     Surgeon(s) and Role:     * Mary Iglesias MD - Primary     * Vane Carney MD - Assisting        Pre-op Diagnosis:    Cyst of ovary, unspecified laterality [N83.209]  Smoker  Pregnancy  Diabetic    Post-op Diagnosis:  Post-Op Diagnosis Codes:  Same as above, s/p ex lap RSO    Procedure(s) (LRB):  LAPAROTOMY, EXPLORATORY (N/A)  RIGHT SALPINGO-OOPHORECTOMY USO (Right)    Anesthesia: General    Description of Procedure:   1. Vertical midline incision  2. Large, cystic, 18 cm right ovarian mass  3. Pelvic washings  4. Uncomplicated right salpingoophorectomy    Estimated Blood Loss: 10cc         Specimens:   Specimen (12h ago, onward)     Start     Ordered    09/24/19 1337  Specimen to Pathology - Surgery  Once     Comments:  Pre-op Diagnosis: Cyst of ovary, unspecified laterality [N83.209]Procedure(s):EXCISION, LYMPH NODELAPAROTOMY, EXPLORATORYSALPINGO-OOPHORECTOMY USO Number of specimens: 1Name of specimens: RIGHT TUBE AND OVARY      09/24/19 1336

## 2019-09-24 NOTE — ANESTHESIA POSTPROCEDURE EVALUATION
Anesthesia Post Evaluation    Patient: Kiana Ryder    Procedure(s) Performed: Procedure(s) (LRB):  LAPAROTOMY, EXPLORATORY (N/A)  RIGHT SALPINGO-OOPHORECTOMY USO (Right)    Final Anesthesia Type: general  Patient location during evaluation: PACU  Patient participation: Yes- Able to Participate  Level of consciousness: awake and alert  Post-procedure vital signs: reviewed and stable  Pain management: adequate  Airway patency: patent  PONV status at discharge: No PONV  Anesthetic complications: no      Cardiovascular status: blood pressure returned to baseline  Respiratory status: unassisted  Hydration status: euvolemic  Follow-up not needed.          Vitals Value Taken Time   /63 9/24/2019  3:13 PM   Temp 36.7 °C (98.1 °F) 9/24/2019  2:47 PM   Pulse 88 9/24/2019  3:18 PM   Resp 18 9/24/2019  3:00 PM   SpO2 93 % 9/24/2019  3:18 PM   Vitals shown include unvalidated device data.      No case tracking events are documented in the log.      Pain/Chano Score: Pain Rating Prior to Med Admin: 7 (9/24/2019  3:12 PM)  Pain Rating Post Med Admin: 7 (9/24/2019  3:12 PM)  Chano Score: 9 (9/24/2019  3:05 PM)

## 2019-09-24 NOTE — TRANSFER OF CARE
"Anesthesia Transfer of Care Note    Patient: Kiana Ryder    Procedure(s) Performed: Procedure(s) (LRB):  LAPAROTOMY, EXPLORATORY (N/A)  RIGHT SALPINGO-OOPHORECTOMY USO (Right)    Patient location: PACU    Anesthesia Type: general    Transport from OR: Transported from OR on 2-3 L/min O2 by NC with adequate spontaneous ventilation    Post pain: adequate analgesia    Post assessment: no apparent anesthetic complications    Post vital signs: stable    Level of consciousness: awake and responds to stimulation    Nausea/Vomiting: no nausea/vomiting    Complications: none    Transfer of care protocol was followed      Last vitals:   Visit Vitals  /66 (BP Location: Right arm, Patient Position: Lying)   Pulse 93   Temp 36.8 °C (98.3 °F) (Oral)   Resp 18   Ht 5' 4" (1.626 m)   Wt 75.8 kg (167 lb)   SpO2 97%   Breastfeeding? No   BMI 28.67 kg/m²     "

## 2019-09-24 NOTE — LETTER
September 24, 2019         [address]  [address]LO[address]  [facility] [address]  Phone: [phone]  Fax: [phone]       Patient: Kiana Ryder   YOB: 1985  Date of Visit: 09/24/2019    To Whom It May Concern:    Kerri Ryder  was at [facility] on 09/24/2019. She may return to work/school on 09/25/2019 with no restrictions. If you have any questions or concerns, or if I can be of further assistance, please do not hesitate to contact me.    Sincerely,            Sofía Seay RN

## 2019-09-25 LAB
ALBUMIN SERPL BCP-MCNC: 2.9 G/DL (ref 3.5–5.2)
ALP SERPL-CCNC: 41 U/L (ref 55–135)
ALT SERPL W/O P-5'-P-CCNC: 7 U/L (ref 10–44)
ANION GAP SERPL CALC-SCNC: 7 MMOL/L (ref 8–16)
AST SERPL-CCNC: 10 U/L (ref 10–40)
BASOPHILS # BLD AUTO: 0.03 K/UL (ref 0–0.2)
BASOPHILS NFR BLD: 0.2 % (ref 0–1.9)
BILIRUB SERPL-MCNC: 0.2 MG/DL (ref 0.1–1)
BUN SERPL-MCNC: 8 MG/DL (ref 6–20)
CALCIUM SERPL-MCNC: 8.6 MG/DL (ref 8.7–10.5)
CHLORIDE SERPL-SCNC: 106 MMOL/L (ref 95–110)
CO2 SERPL-SCNC: 20 MMOL/L (ref 23–29)
CREAT SERPL-MCNC: 0.6 MG/DL (ref 0.5–1.4)
DIFFERENTIAL METHOD: ABNORMAL
EOSINOPHIL # BLD AUTO: 0.1 K/UL (ref 0–0.5)
EOSINOPHIL NFR BLD: 0.3 % (ref 0–8)
ERYTHROCYTE [DISTWIDTH] IN BLOOD BY AUTOMATED COUNT: 12.9 % (ref 11.5–14.5)
EST. GFR  (AFRICAN AMERICAN): >60 ML/MIN/1.73 M^2
EST. GFR  (NON AFRICAN AMERICAN): >60 ML/MIN/1.73 M^2
GLUCOSE SERPL-MCNC: 134 MG/DL (ref 70–110)
GLUCOSE SERPL-MCNC: 149 MG/DL (ref 70–110)
HCT VFR BLD AUTO: 32.9 % (ref 37–48.5)
HGB BLD-MCNC: 10.7 G/DL (ref 12–16)
IMM GRANULOCYTES # BLD AUTO: 0.12 K/UL (ref 0–0.04)
IMM GRANULOCYTES NFR BLD AUTO: 0.8 % (ref 0–0.5)
LYMPHOCYTES # BLD AUTO: 3 K/UL (ref 1–4.8)
LYMPHOCYTES NFR BLD: 19.1 % (ref 18–48)
MCH RBC QN AUTO: 28.7 PG (ref 27–31)
MCHC RBC AUTO-ENTMCNC: 32.5 G/DL (ref 32–36)
MCV RBC AUTO: 88 FL (ref 82–98)
MONOCYTES # BLD AUTO: 1 K/UL (ref 0.3–1)
MONOCYTES NFR BLD: 6.2 % (ref 4–15)
NEUTROPHILS # BLD AUTO: 11.4 K/UL (ref 1.8–7.7)
NEUTROPHILS NFR BLD: 73.4 % (ref 38–73)
NRBC BLD-RTO: 0 /100 WBC
PLATELET # BLD AUTO: 200 K/UL (ref 150–350)
PMV BLD AUTO: 12.5 FL (ref 9.2–12.9)
POCT GLUCOSE: 124 MG/DL (ref 70–110)
POCT GLUCOSE: 136 MG/DL (ref 70–110)
POCT GLUCOSE: 156 MG/DL (ref 70–110)
POCT GLUCOSE: 214 MG/DL (ref 70–110)
POTASSIUM SERPL-SCNC: 3.7 MMOL/L (ref 3.5–5.1)
PROT SERPL-MCNC: 5.9 G/DL (ref 6–8.4)
RBC # BLD AUTO: 3.73 M/UL (ref 4–5.4)
SODIUM SERPL-SCNC: 133 MMOL/L (ref 136–145)
WBC # BLD AUTO: 15.48 K/UL (ref 3.9–12.7)

## 2019-09-25 PROCEDURE — 99024 POSTOP FOLLOW-UP VISIT: CPT | Mod: ,,, | Performed by: OBSTETRICS & GYNECOLOGY

## 2019-09-25 PROCEDURE — 99231 SBSQ HOSP IP/OBS SF/LOW 25: CPT | Mod: ,,, | Performed by: OBSTETRICS & GYNECOLOGY

## 2019-09-25 PROCEDURE — 36415 COLL VENOUS BLD VENIPUNCTURE: CPT

## 2019-09-25 PROCEDURE — 99024 PR POST-OP FOLLOW-UP VISIT: ICD-10-PCS | Mod: ,,, | Performed by: OBSTETRICS & GYNECOLOGY

## 2019-09-25 PROCEDURE — 25000003 PHARM REV CODE 250: Performed by: STUDENT IN AN ORGANIZED HEALTH CARE EDUCATION/TRAINING PROGRAM

## 2019-09-25 PROCEDURE — 63600175 PHARM REV CODE 636 W HCPCS: Performed by: STUDENT IN AN ORGANIZED HEALTH CARE EDUCATION/TRAINING PROGRAM

## 2019-09-25 PROCEDURE — 80053 COMPREHEN METABOLIC PANEL: CPT

## 2019-09-25 PROCEDURE — 94799 UNLISTED PULMONARY SVC/PX: CPT

## 2019-09-25 PROCEDURE — 99231 PR SUBSEQUENT HOSPITAL CARE,LEVL I: ICD-10-PCS | Mod: ,,, | Performed by: OBSTETRICS & GYNECOLOGY

## 2019-09-25 PROCEDURE — 85025 COMPLETE CBC W/AUTO DIFF WBC: CPT

## 2019-09-25 PROCEDURE — 11000001 HC ACUTE MED/SURG PRIVATE ROOM

## 2019-09-25 PROCEDURE — 94761 N-INVAS EAR/PLS OXIMETRY MLT: CPT

## 2019-09-25 RX ORDER — INSULIN ASPART 100 [IU]/ML
5 INJECTION, SOLUTION INTRAVENOUS; SUBCUTANEOUS 2 TIMES DAILY WITH MEALS
Status: DISCONTINUED | OUTPATIENT
Start: 2019-09-25 | End: 2019-09-26 | Stop reason: HOSPADM

## 2019-09-25 RX ORDER — INDOMETHACIN 25 MG/1
50 CAPSULE ORAL 4 TIMES DAILY
Status: DISCONTINUED | OUTPATIENT
Start: 2019-09-25 | End: 2019-09-26

## 2019-09-25 RX ADMIN — SENNOSIDES,DOCUSATE SODIUM 1 TABLET: 8.6; 5 TABLET, FILM COATED ORAL at 08:09

## 2019-09-25 RX ADMIN — SODIUM CHLORIDE 500 ML: 0.9 INJECTION, SOLUTION INTRAVENOUS at 06:09

## 2019-09-25 RX ADMIN — OXYCODONE HYDROCHLORIDE 5 MG: 5 TABLET ORAL at 05:09

## 2019-09-25 RX ADMIN — OXYCODONE HYDROCHLORIDE 5 MG: 5 TABLET ORAL at 12:09

## 2019-09-25 RX ADMIN — HUMAN INSULIN 10 UNITS: 100 INJECTION, SUSPENSION SUBCUTANEOUS at 10:09

## 2019-09-25 RX ADMIN — HYDROMORPHONE HYDROCHLORIDE 1 MG: 1 INJECTION, SOLUTION INTRAMUSCULAR; INTRAVENOUS; SUBCUTANEOUS at 02:09

## 2019-09-25 RX ADMIN — LEVOTHYROXINE SODIUM 25 MCG: 25 TABLET ORAL at 06:09

## 2019-09-25 RX ADMIN — FAMOTIDINE 20 MG: 20 TABLET, FILM COATED ORAL at 08:09

## 2019-09-25 RX ADMIN — HYDROMORPHONE HYDROCHLORIDE 1 MG: 1 INJECTION, SOLUTION INTRAMUSCULAR; INTRAVENOUS; SUBCUTANEOUS at 08:09

## 2019-09-25 RX ADMIN — OXYCODONE HYDROCHLORIDE 5 MG: 5 TABLET ORAL at 10:09

## 2019-09-25 RX ADMIN — INDOMETHACIN 50 MG: 25 CAPSULE ORAL at 06:09

## 2019-09-25 RX ADMIN — HYDROMORPHONE HYDROCHLORIDE 1 MG: 1 INJECTION, SOLUTION INTRAMUSCULAR; INTRAVENOUS; SUBCUTANEOUS at 04:09

## 2019-09-25 RX ADMIN — INDOMETHACIN 50 MG: 25 CAPSULE ORAL at 08:09

## 2019-09-25 RX ADMIN — INSULIN ASPART 5 UNITS: 100 INJECTION, SOLUTION INTRAVENOUS; SUBCUTANEOUS at 05:09

## 2019-09-25 RX ADMIN — MUPIROCIN 1 G: 20 OINTMENT TOPICAL at 10:09

## 2019-09-25 RX ADMIN — INSULIN ASPART 5 UNITS: 100 INJECTION, SOLUTION INTRAVENOUS; SUBCUTANEOUS at 09:09

## 2019-09-25 RX ADMIN — ACETAMINOPHEN 650 MG: 325 TABLET, FILM COATED ORAL at 03:09

## 2019-09-25 RX ADMIN — INDOMETHACIN 50 MG: 25 CAPSULE ORAL at 05:09

## 2019-09-25 RX ADMIN — OXYCODONE HYDROCHLORIDE 5 MG: 5 TABLET ORAL at 08:09

## 2019-09-25 RX ADMIN — ENOXAPARIN SODIUM 40 MG: 100 INJECTION SUBCUTANEOUS at 05:09

## 2019-09-25 RX ADMIN — ONDANSETRON 8 MG: 8 TABLET, ORALLY DISINTEGRATING ORAL at 09:09

## 2019-09-25 RX ADMIN — INDOMETHACIN 50 MG: 25 CAPSULE ORAL at 12:09

## 2019-09-25 RX ADMIN — MUPIROCIN 1 G: 20 OINTMENT TOPICAL at 08:09

## 2019-09-25 NOTE — ASSESSMENT & PLAN NOTE
- Transition to oral pain meds  - AM labs reassuring  - Cloud removed, begin passive voiding trial  - Regular diet  - Encourage ambulation  - IS at bedside  - Strict I/O  - Lovenox to begin tonight

## 2019-09-25 NOTE — PLAN OF CARE
LMSW met with patient at the bedside.     Patient is alert and oriented with no communication barriers.      Prior to admission patient was independent. Patient denies the use of HH or DME.     Patients PC is correct on the face sheet. Patient choice pharmacy is [facility] [place].     Patient denies a history of mental illness.    Patient denies having advance directives.     Patient will be transported home by a friend at discharge.     No CM needs identified at this time.     CM Team will continue to follow.      09/25/19 1113   Discharge Assessment   Assessment Type Discharge Planning Assessment   Confirmed/corrected address and phone number on facesheet? Yes   Assessment information obtained from? Patient   Communicated expected length of stay with patient/caregiver no   Prior to hospitilization cognitive status: Alert/Oriented   Prior to hospitalization functional status: Independent   Current cognitive status: Alert/Oriented   Current Functional Status: Independent   Lives With alone   Able to Return to Prior Arrangements yes   Is patient able to care for self after discharge? Yes   Patient's perception of discharge disposition home or selfcare   Readmission Within the Last 30 Days no previous admission in last 30 days   Patient currently being followed by outpatient case management? No   Patient currently receives any other outside agency services? No   Equipment Currently Used at Home none   Do you have any problems affording any of your prescribed medications? No   Is the patient taking medications as prescribed? yes   Does the patient have transportation home? Yes   Transportation Anticipated family or friend will provide   Does the patient receive services at the [facility]? No   Discharge Plan A Home   DME Needed Upon Discharge  none   Patient/Family in Agreement with Plan yes

## 2019-09-25 NOTE — HPI
Ms Ryder is a 33 yo at 17w4d who presents for scheduled ex lap with RSO for enlarging cystic adnexal mass in pregnancy.

## 2019-09-25 NOTE — PROGRESS NOTES
BGs elevated overnight and this morning. Will restart insulin home regimen this morning as patient is tolerating diabetic diet.     Vane Carney MD  Maternal Fetal Medicine fellow  PGY-5

## 2019-09-25 NOTE — SUBJECTIVE & OBJECTIVE
"Interval:   POD 1 s/p ex lap, RSO, pelvic washings. Patient doing well. Tolerating PO without N/V. Noriega is draining straw colored urine. Has not ambulated. Pain well controlled with total 2mg IV hydromorphone and 10mg oxy IR required last night. Suppository indomethacin refused and changed to PO this morning. Patient states, "I need this noriega out because I need to get out of bed and walk around!" No other complaints    Objective:     Vital Signs (Most Recent):  Temp: 98.5 °F (36.9 °C) (09/25/19 0439)  Pulse: 97 (09/25/19 0439)  Resp: 16 (09/25/19 0439)  BP: 105/65 (09/25/19 0439)  SpO2: (!) 94 % (09/25/19 0439) Vital Signs (24h Range):  Temp:  [97.7 °F (36.5 °C)-98.5 °F (36.9 °C)] 98.5 °F (36.9 °C)  Pulse:  [] 97  Resp:  [16-18] 16  SpO2:  [92 %-97 %] 94 %  BP: (105-128)/(57-66) 105/65     Weight: 76.1 kg (167 lb 12.3 oz)  Body mass index is 28.8 kg/m².        Intake/Output Summary (Last 24 hours) at 9/25/2019 0615  Last data filed at 9/25/2019 0458  Gross per 24 hour   Intake 2820 ml   Output 3120 ml   Net -300 ml         Significant Labs:  Recent Lab Results       09/25/19  0434   09/24/19  2046   09/24/19  1522   09/24/19  1015   09/24/19  1013        Albumin 2.9             Alkaline Phosphatase 41             ALT 7             Anion Gap 7             AST 10             Baso # 0.03             Basophil% 0.2             BILIRUBIN TOTAL 0.2  Comment:  For infants and newborns, interpretation of results should be based  on gestational age, weight and in agreement with clinical  observations.  Premature Infant recommended reference ranges:  Up to 24 hours.............<8.0 mg/dL  Up to 48 hours............<12.0 mg/dL  3-5 days..................<15.0 mg/dL  6-29 days.................<15.0 mg/dL               BUN, Bld 8             Calcium 8.6             Chloride 106             CO2 20             Creatinine 0.6             Differential Method Automated             eGFR if  >60             eGFR " if non  >60  Comment:  Calculation used to obtain the estimated glomerular filtration  rate (eGFR) is the CKD-EPI equation.                Eos # 0.1             Eosinophil% 0.3             Glucose 149             Gran # (ANC) 11.4             Gran% 73.4             Group & Rh         B POS     Hematocrit 32.9             Hemoglobin 10.7             Immature Grans (Abs) 0.12  Comment:  Mild elevation in immature granulocytes is non specific and   can be seen in a variety of conditions including stress response,   acute inflammation, trauma and pregnancy. Correlation with other   laboratory and clinical findings is essential.               Immature Granulocytes 0.8             INDIRECT SHAKIR         NEG     Lymph # 3.0             Lymph% 19.1             MCH 28.7             MCHC 32.5             MCV 88             Mono # 1.0             Mono% 6.2             MPV 12.5             nRBC 0             Platelets 200             POCT Glucose   214 174 117       Potassium 3.7             PROTEIN TOTAL 5.9             RBC 3.73             RDW 12.9             Sodium 133             WBC 15.48                                  Physical Exam:   Constitutional: She appears well-developed and well-nourished. No distress.    HENT:   Head: Normocephalic and atraumatic.      Cardiovascular: Normal rate and regular rhythm.   Pulse Score: 2+   Pulmonary/Chest: Effort normal and breath sounds normal. No respiratory distress. She has no wheezes.        Abdominal: Soft. Bowel sounds are normal. She exhibits abdominal incision (vertical midline covered with island dressing). She exhibits no distension. There is tenderness (minimal, appropriate). There is no guarding.                 Neurological: She is alert.    Skin: Skin is warm and dry. She is not diaphoretic.    Psychiatric: She has a normal mood and affect.

## 2019-09-25 NOTE — ASSESSMENT & PLAN NOTE
- Fasting glucose pending  - Required 2 units insulin overnight based on sliding scale  - Restart home insulin now  - Restart metformin at discharge

## 2019-09-25 NOTE — PROGRESS NOTES
Patient reportedly refusing blood glucose checks throughout the day per charge RN    Leonardo Salgado MD  OB/GYN PGY-4  Pager: [phone]

## 2019-09-25 NOTE — NURSING
"Called OB/GYN to clarify prn pain medication order, patient has oxycodone 5mg as a duplicate order for moderate and severe pain on the pain scale. Stated "Ok ill get it changed"  "

## 2019-09-25 NOTE — SUBJECTIVE & OBJECTIVE
"Interval  POD 1 s/p ex lap, RSO, pelvic washings. Patient doing well. Tolerating PO without N/V. Noriega is draining straw colored urine. Has not ambulated. Pain well controlled with total 2mg IV hydromorphone and 10mg oxy IR required last night. Suppository indomethacin refused and changed to PO this morning. Patient states, "I need this noriega out because I need to get out of bed and walk around!" No other complaints  Objective:     Vital Signs (Most Recent):  Temp: 98.1 °F (36.7 °C) (09/25/19 0702)  Pulse: 97 (09/25/19 0702)  Resp: 16 (09/25/19 0702)  BP: 120/64 (09/25/19 0702)  SpO2: (!) 93 % (09/25/19 0702) Vital Signs (24h Range):  Temp:  [97.7 °F (36.5 °C)-98.5 °F (36.9 °C)] 98.1 °F (36.7 °C)  Pulse:  [] 97  Resp:  [16-18] 16  SpO2:  [92 %-97 %] 93 %  BP: (105-128)/(57-66) 120/64     Weight: 76.1 kg (167 lb 12.3 oz)  Body mass index is 28.8 kg/m².        Intake/Output Summary (Last 24 hours) at 9/25/2019 0913  Last data filed at 9/25/2019 0626  Gross per 24 hour   Intake 2820 ml   Output 3320 ml   Net -500 ml            Significant Labs:  Recent Lab Results       09/25/19  0434   09/24/19  2046   09/24/19  1522   09/24/19  1015   09/24/19  1013        Albumin 2.9             Alkaline Phosphatase 41             ALT 7             Anion Gap 7             AST 10             Baso # 0.03             Basophil% 0.2             BILIRUBIN TOTAL 0.2  Comment:  For infants and newborns, interpretation of results should be based  on gestational age, weight and in agreement with clinical  observations.  Premature Infant recommended reference ranges:  Up to 24 hours.............<8.0 mg/dL  Up to 48 hours............<12.0 mg/dL  3-5 days..................<15.0 mg/dL  6-29 days.................<15.0 mg/dL               BUN, Bld 8             Calcium 8.6             Chloride 106             CO2 20             Creatinine 0.6             Differential Method Automated             eGFR if  >60             eGFR " if non  >60  Comment:  Calculation used to obtain the estimated glomerular filtration  rate (eGFR) is the CKD-EPI equation.                Eos # 0.1             Eosinophil% 0.3             Glucose 149             Gran # (ANC) 11.4             Gran% 73.4             Group & Rh         B POS     Hematocrit 32.9             Hemoglobin 10.7             Immature Grans (Abs) 0.12  Comment:  Mild elevation in immature granulocytes is non specific and   can be seen in a variety of conditions including stress response,   acute inflammation, trauma and pregnancy. Correlation with other   laboratory and clinical findings is essential.               Immature Granulocytes 0.8             INDIRECT SHAKIR         NEG     Lymph # 3.0             Lymph% 19.1             MCH 28.7             MCHC 32.5             MCV 88             Mono # 1.0             Mono% 6.2             MPV 12.5             nRBC 0             Platelets 200             POCT Glucose   214 174 117       Potassium 3.7             PROTEIN TOTAL 5.9             RBC 3.73             RDW 12.9             Sodium 133             WBC 15.48                                  Physical Exam:   Constitutional: She appears well-developed and well-nourished. No distress.    HENT:   Head: Normocephalic and atraumatic.      Cardiovascular: Normal rate and regular rhythm.   Pulse Score: 2+   Pulmonary/Chest: Effort normal and breath sounds normal. No respiratory distress. She has no wheezes.        Abdominal: Soft. Bowel sounds are normal. She exhibits abdominal incision (midline vertical with clean bandage). She exhibits no distension. There is tenderness (appropriately tender). There is no guarding.                 Neurological: She is alert.    Skin: Skin is warm and dry. She is not diaphoretic.    Psychiatric: She has a normal mood and affect.

## 2019-09-25 NOTE — ASSESSMENT & PLAN NOTE
- Fasting glucose pending  - Required 2 units insulin overnight based on sliding scale  - Restart metformin at discharge

## 2019-09-25 NOTE — HOSPITAL COURSE
"09/25/2019 - POD 1 s/p ex lap, RSO, pelvic washings. Patient doing well. Tolerating PO without N/V. Noriega is draining straw colored urine. Has not ambulated. Pain well controlled with total 2mg IV hydromorphone and 10mg oxy IR required last night. Suppository indomethacin refused and changed to PO this morning. Patient states, "I need this noriega out because I need to get out of bed and walk around!" No other complaints  09/26/2019 - POD 2, patient doing well. Pain controlled with 2 mg hydromorphone IV and 10 mg oxy IR total overnight. Has passed flatus. Ambulating. Tolerating regular diet. Voiding spontaneously. No complaints.   "

## 2019-09-25 NOTE — PLAN OF CARE
Free from falls, skin break down or injury this shift. Resp even and unlabored on room air. Tolerating diabetic diet, however non compliant with diet. Having outside food/drinks brought in. Also refused noon blood sugar check after eating [employer] and her lunch tray. Pain well controlled with po/iv pain medications. Midline incision with steristrips, CDI. Abdomen tender to touch. Passing flatus/belching. Voiding spontaneously without complication since noriega removal this am. Ambulating in halls also this shift. Family at bedside. Bed in lowest locked position, side raisl elevated, cb in reach. Purposeful rounding done every hour.

## 2019-09-25 NOTE — PROGRESS NOTES
Seen this am. Incision c/d/i. Denies cramps or vaginal bleeding.  Reviewed precautions. Will continue to follow. D/C indocin in am.  Follow glucose and resume insulin. Slide as needed.

## 2019-09-25 NOTE — PLAN OF CARE
Plan of care reviewed with patient. Pt remains free from fall, injury, and skin breakdown. Pt encouraged to ambulate and reposition in bed but refuse and states the pain hurts. Pt complaints of RUQ pain. Pt pain mildly controlled by PO and IV PRN pain medication. Pt tolerating diet. Blood glucose monitoring. Pt refuse indomethacin suppository and states nobody sticking nothing up my butt, pt educated on the important and reason for medication, pt still refuse. Purposeful hourly rounding done. Safety maintained. Patient lying in bed. Will continue to monitor.

## 2019-09-25 NOTE — PROGRESS NOTES
"[facility]  Obstetrics  Antepartum Progress Note    Patient Name: Kiana Ryder  MRN: [id number]  Admission Date: 9/24/2019  Hospital Length of Stay: 1 days  Attending Physician: Mary Iglesias MD  Primary Care Provider: Grady Billings MD    Subjective:     Principal Problem:Pregnancy    HPI:  Ms Ryder is a 35 yo at 17w4d who presents for scheduled ex lap with RSO for enlarging cystic adnexal mass in pregnancy.     Hospital Course:  09/25/2019 - POD 1 s/p ex lap, RSO, pelvic washings    Interval  POD 1 s/p ex lap, RSO, pelvic washings. Patient doing well. Tolerating PO without N/V. Noriega is draining straw colored urine. Has not ambulated. Pain well controlled with total 2mg IV hydromorphone and 10mg oxy IR required last night. Suppository indomethacin refused and changed to PO this morning. Patient states, "I need this noriega out because I need to get out of bed and walk around!" No other complaints  Objective:     Vital Signs (Most Recent):  Temp: 98.1 °F (36.7 °C) (09/25/19 0702)  Pulse: 97 (09/25/19 0702)  Resp: 16 (09/25/19 0702)  BP: 120/64 (09/25/19 0702)  SpO2: (!) 93 % (09/25/19 0702) Vital Signs (24h Range):  Temp:  [97.7 °F (36.5 °C)-98.5 °F (36.9 °C)] 98.1 °F (36.7 °C)  Pulse:  [] 97  Resp:  [16-18] 16  SpO2:  [92 %-97 %] 93 %  BP: (105-128)/(57-66) 120/64     Weight: 76.1 kg (167 lb 12.3 oz)  Body mass index is 28.8 kg/m².        Intake/Output Summary (Last 24 hours) at 9/25/2019 0913  Last data filed at 9/25/2019 0626  Gross per 24 hour   Intake 2820 ml   Output 3320 ml   Net -500 ml            Significant Labs:  Recent Lab Results       09/25/19  0434   09/24/19  2046   09/24/19  1522   09/24/19  1015   09/24/19  1013        Albumin 2.9             Alkaline Phosphatase 41             ALT 7             Anion Gap 7             AST 10             Baso # 0.03             Basophil% 0.2             BILIRUBIN TOTAL 0.2  Comment:  For infants and newborns, interpretation of " results should be based  on gestational age, weight and in agreement with clinical  observations.  Premature Infant recommended reference ranges:  Up to 24 hours.............<8.0 mg/dL  Up to 48 hours............<12.0 mg/dL  3-5 days..................<15.0 mg/dL  6-29 days.................<15.0 mg/dL               BUN, Bld 8             Calcium 8.6             Chloride 106             CO2 20             Creatinine 0.6             Differential Method Automated             eGFR if  >60             eGFR if non  >60  Comment:  Calculation used to obtain the estimated glomerular filtration  rate (eGFR) is the CKD-EPI equation.                Eos # 0.1             Eosinophil% 0.3             Glucose 149             Gran # (ANC) 11.4             Gran% 73.4             Group & Rh         B POS     Hematocrit 32.9             Hemoglobin 10.7             Immature Grans (Abs) 0.12  Comment:  Mild elevation in immature granulocytes is non specific and   can be seen in a variety of conditions including stress response,   acute inflammation, trauma and pregnancy. Correlation with other   laboratory and clinical findings is essential.               Immature Granulocytes 0.8             INDIRECT SHAKIR         NEG     Lymph # 3.0             Lymph% 19.1             MCH 28.7             MCHC 32.5             MCV 88             Mono # 1.0             Mono% 6.2             MPV 12.5             nRBC 0             Platelets 200             POCT Glucose   214 174 117       Potassium 3.7             PROTEIN TOTAL 5.9             RBC 3.73             RDW 12.9             Sodium 133             WBC 15.48                                  Physical Exam:   Constitutional: She appears well-developed and well-nourished. No distress.    HENT:   Head: Normocephalic and atraumatic.      Cardiovascular: Normal rate and regular rhythm.   Pulse Score: 2+   Pulmonary/Chest: Effort normal and breath sounds normal. No  respiratory distress. She has no wheezes.        Abdominal: Soft. Bowel sounds are normal. She exhibits abdominal incision (midline vertical with clean bandage). She exhibits no distension. There is tenderness (appropriately tender). There is no guarding.                 Neurological: She is alert.    Skin: Skin is warm and dry. She is not diaphoretic.    Psychiatric: She has a normal mood and affect.       Assessment/Plan:     34 y.o. female  at 17w5d for:    * Pregnancy  - Indomethacin ordered as suppository, refused. Changed to PO this morning  -  this morning    S/P ex lap with right salpingo oophorectomy  - Transition to oral pain meds  - AM labs reassuring  - Cloud removed, begin passive voiding trial  - Regular diet  - Encourage ambulation  - IS at bedside  - Strict I/O  - Lovenox to begin tonight        Type 2 diabetes mellitus, without long-term current use of insulin  - Fasting glucose pending  - Required 2 units insulin overnight based on sliding scale  - Restart home insulin now  - Restart metformin at discharge      Low O2 sats  - Normal when awake, possible EDNA  - IS at bedside      Leonardo Salgado MD  Obstetrics  [facility]

## 2019-09-25 NOTE — PROGRESS NOTES
"[facility]  Obstetrics  Antepartum Progress Note    Patient Name: Kiana Ryder  MRN: [id number]  Admission Date: 9/24/2019  Hospital Length of Stay: 1 days  Attending Physician: Mary Iglesias MD  Primary Care Provider: Grady Billings MD    Subjective:     Principal Problem:Pregnancy    HPI:  Ms Ryder is a 35 yo at 17w4d who presents for scheduled ex lap with RSO for enlarging cystic adnexal mass in pregnancy.     Hospital Course:  09/25/2019 - POD 1 s/p ex lap, RSO, pelvic washings. Patient doing well. Tolerating PO without N/V. Noriega is draining straw colored urine. Has not ambulated. Pain well controlled with total 2mg IV hydromorphone and 10mg oxy IR required last night. Suppository indomethacin refused and changed to PO this morning. Patient states, "I need this noriega out because I need to get out of bed and walk around!" No other complaints    Interval:   POD 1 s/p ex lap, RSO, pelvic washings. Patient doing well. Tolerating PO without N/V. Noriega is draining straw colored urine. Has not ambulated. Pain well controlled with total 2mg IV hydromorphone and 10mg oxy IR required last night. Suppository indomethacin refused and changed to PO this morning. Patient states, "I need this noriega out because I need to get out of bed and walk around!" No other complaints    Objective:     Vital Signs (Most Recent):  Temp: 98.5 °F (36.9 °C) (09/25/19 0439)  Pulse: 97 (09/25/19 0439)  Resp: 16 (09/25/19 0439)  BP: 105/65 (09/25/19 0439)  SpO2: (!) 94 % (09/25/19 0439) Vital Signs (24h Range):  Temp:  [97.7 °F (36.5 °C)-98.5 °F (36.9 °C)] 98.5 °F (36.9 °C)  Pulse:  [] 97  Resp:  [16-18] 16  SpO2:  [92 %-97 %] 94 %  BP: (105-128)/(57-66) 105/65     Weight: 76.1 kg (167 lb 12.3 oz)  Body mass index is 28.8 kg/m².        Intake/Output Summary (Last 24 hours) at 9/25/2019 0615  Last data filed at 9/25/2019 0458  Gross per 24 hour   Intake 2820 ml   Output 3120 ml   Net -300 ml         Significant " Labs:  Recent Lab Results       09/25/19  0434   09/24/19  2046   09/24/19  1522   09/24/19  1015   09/24/19  1013        Albumin 2.9             Alkaline Phosphatase 41             ALT 7             Anion Gap 7             AST 10             Baso # 0.03             Basophil% 0.2             BILIRUBIN TOTAL 0.2  Comment:  For infants and newborns, interpretation of results should be based  on gestational age, weight and in agreement with clinical  observations.  Premature Infant recommended reference ranges:  Up to 24 hours.............<8.0 mg/dL  Up to 48 hours............<12.0 mg/dL  3-5 days..................<15.0 mg/dL  6-29 days.................<15.0 mg/dL               BUN, Bld 8             Calcium 8.6             Chloride 106             CO2 20             Creatinine 0.6             Differential Method Automated             eGFR if  >60             eGFR if non  >60  Comment:  Calculation used to obtain the estimated glomerular filtration  rate (eGFR) is the CKD-EPI equation.                Eos # 0.1             Eosinophil% 0.3             Glucose 149             Gran # (ANC) 11.4             Gran% 73.4             Group & Rh         B POS     Hematocrit 32.9             Hemoglobin 10.7             Immature Grans (Abs) 0.12  Comment:  Mild elevation in immature granulocytes is non specific and   can be seen in a variety of conditions including stress response,   acute inflammation, trauma and pregnancy. Correlation with other   laboratory and clinical findings is essential.               Immature Granulocytes 0.8             INDIRECT SHAKIR         NEG     Lymph # 3.0             Lymph% 19.1             MCH 28.7             MCHC 32.5             MCV 88             Mono # 1.0             Mono% 6.2             MPV 12.5             nRBC 0             Platelets 200             POCT Glucose   214 174 117       Potassium 3.7             PROTEIN TOTAL 5.9             RBC 3.73              RDW 12.9             Sodium 133             WBC 15.48                                  Physical Exam:   Constitutional: She appears well-developed and well-nourished. No distress.    HENT:   Head: Normocephalic and atraumatic.      Cardiovascular: Normal rate and regular rhythm.   Pulse Score: 2+   Pulmonary/Chest: Effort normal and breath sounds normal. No respiratory distress. She has no wheezes.        Abdominal: Soft. Bowel sounds are normal. She exhibits abdominal incision (vertical midline covered with island dressing). She exhibits no distension. There is tenderness (minimal, appropriate). There is no guarding.                 Neurological: She is alert.    Skin: Skin is warm and dry. She is not diaphoretic.    Psychiatric: She has a normal mood and affect.       Assessment/Plan:     34 y.o. female  at 17w5d for:    * Pregnancy  - Indomethacin ordered as suppository, refused. Changed to PO this morning  -  this morning    S/P ex lap with right salpingo oophorectomy  - Transition to oral pain meds  - AM labs reassuring  - Cloud removed, begin passive voiding trial  - Regular diet  - Encourage ambulation  - IS at bedside  - Strict I/O  - Lovenox to begin tonight        Type 2 diabetes mellitus, without long-term current use of insulin  - Fasting glucose pending  - Required 2 units insulin overnight based on sliding scale  - Restart metformin at discharge          Leonardo Salgado MD  Obstetrics  [facility]      Patient seen and examined. Agree with above.   Reviewed surgical and pathologic findings with patient.     Abd soft, incision healing well    Continue routine postop care with postop advances as tolerated  Anticipate d/c home tomorrow if meeting all postop milestones    Vane Carney MD  Maternal Fetal Medicine fellow  PGY-5

## 2019-09-26 ENCOUNTER — TELEPHONE (OUTPATIENT)
Dept: GYNECOLOGIC ONCOLOGY | Facility: CLINIC | Age: 34
End: 2019-09-26

## 2019-09-26 VITALS
RESPIRATION RATE: 18 BRPM | OXYGEN SATURATION: 95 % | SYSTOLIC BLOOD PRESSURE: 132 MMHG | DIASTOLIC BLOOD PRESSURE: 68 MMHG | HEIGHT: 64 IN | BODY MASS INDEX: 28.64 KG/M2 | WEIGHT: 167.75 LBS | HEART RATE: 98 BPM | TEMPERATURE: 99 F

## 2019-09-26 DIAGNOSIS — Z90.721 S/P RIGHT OOPHORECTOMY: Primary | ICD-10-CM

## 2019-09-26 PROBLEM — R19.00 PELVIC MASS: Status: RESOLVED | Noted: 2019-09-24 | Resolved: 2019-09-26

## 2019-09-26 LAB
ALBUMIN SERPL BCP-MCNC: 2.8 G/DL (ref 3.5–5.2)
ALP SERPL-CCNC: 47 U/L (ref 55–135)
ALT SERPL W/O P-5'-P-CCNC: 11 U/L (ref 10–44)
ANION GAP SERPL CALC-SCNC: 9 MMOL/L (ref 8–16)
AST SERPL-CCNC: 9 U/L (ref 10–40)
BILIRUB SERPL-MCNC: 0.4 MG/DL (ref 0.1–1)
BUN SERPL-MCNC: 9 MG/DL (ref 6–20)
CALCIUM SERPL-MCNC: 8.6 MG/DL (ref 8.7–10.5)
CHLORIDE SERPL-SCNC: 106 MMOL/L (ref 95–110)
CO2 SERPL-SCNC: 18 MMOL/L (ref 23–29)
CREAT SERPL-MCNC: 0.6 MG/DL (ref 0.5–1.4)
EST. GFR  (AFRICAN AMERICAN): >60 ML/MIN/1.73 M^2
EST. GFR  (NON AFRICAN AMERICAN): >60 ML/MIN/1.73 M^2
GLUCOSE SERPL-MCNC: 139 MG/DL (ref 70–110)
POCT GLUCOSE: 137 MG/DL (ref 70–110)
POCT GLUCOSE: 163 MG/DL (ref 70–110)
POTASSIUM SERPL-SCNC: 3.7 MMOL/L (ref 3.5–5.1)
PROT SERPL-MCNC: 6 G/DL (ref 6–8.4)
SODIUM SERPL-SCNC: 133 MMOL/L (ref 136–145)

## 2019-09-26 PROCEDURE — 94761 N-INVAS EAR/PLS OXIMETRY MLT: CPT

## 2019-09-26 PROCEDURE — 99024 PR POST-OP FOLLOW-UP VISIT: ICD-10-PCS | Mod: ,,, | Performed by: OBSTETRICS & GYNECOLOGY

## 2019-09-26 PROCEDURE — 36415 COLL VENOUS BLD VENIPUNCTURE: CPT

## 2019-09-26 PROCEDURE — 63600175 PHARM REV CODE 636 W HCPCS: Performed by: STUDENT IN AN ORGANIZED HEALTH CARE EDUCATION/TRAINING PROGRAM

## 2019-09-26 PROCEDURE — 25000003 PHARM REV CODE 250: Performed by: STUDENT IN AN ORGANIZED HEALTH CARE EDUCATION/TRAINING PROGRAM

## 2019-09-26 PROCEDURE — 94799 UNLISTED PULMONARY SVC/PX: CPT

## 2019-09-26 PROCEDURE — 99024 POSTOP FOLLOW-UP VISIT: CPT | Mod: ,,, | Performed by: OBSTETRICS & GYNECOLOGY

## 2019-09-26 PROCEDURE — 80053 COMPREHEN METABOLIC PANEL: CPT

## 2019-09-26 RX ORDER — OXYCODONE HYDROCHLORIDE 5 MG/1
5 CAPSULE ORAL EVERY 4 HOURS PRN
Qty: 20 CAPSULE | Refills: 0 | Status: SHIPPED | OUTPATIENT
Start: 2019-09-26 | End: 2019-09-26

## 2019-09-26 RX ORDER — OXYCODONE HYDROCHLORIDE 5 MG/1
5 TABLET ORAL EVERY 4 HOURS PRN
Qty: 30 TABLET | Refills: 0 | Status: SHIPPED | OUTPATIENT
Start: 2019-09-26 | End: 2019-10-18

## 2019-09-26 RX ORDER — OXYCODONE HYDROCHLORIDE 5 MG/1
10 TABLET ORAL EVERY 4 HOURS PRN
Status: DISCONTINUED | OUTPATIENT
Start: 2019-09-26 | End: 2019-09-26 | Stop reason: HOSPADM

## 2019-09-26 RX ADMIN — MUPIROCIN 1 G: 20 OINTMENT TOPICAL at 08:09

## 2019-09-26 RX ADMIN — ONDANSETRON 8 MG: 8 TABLET, ORALLY DISINTEGRATING ORAL at 08:09

## 2019-09-26 RX ADMIN — HYDROMORPHONE HYDROCHLORIDE 1 MG: 1 INJECTION, SOLUTION INTRAMUSCULAR; INTRAVENOUS; SUBCUTANEOUS at 12:09

## 2019-09-26 RX ADMIN — SENNOSIDES,DOCUSATE SODIUM 1 TABLET: 8.6; 5 TABLET, FILM COATED ORAL at 08:09

## 2019-09-26 RX ADMIN — HUMAN INSULIN 10 UNITS: 100 INJECTION, SUSPENSION SUBCUTANEOUS at 08:09

## 2019-09-26 RX ADMIN — LEVOTHYROXINE SODIUM 25 MCG: 25 TABLET ORAL at 06:09

## 2019-09-26 RX ADMIN — INSULIN ASPART 5 UNITS: 100 INJECTION, SOLUTION INTRAVENOUS; SUBCUTANEOUS at 08:09

## 2019-09-26 RX ADMIN — FAMOTIDINE 20 MG: 20 TABLET, FILM COATED ORAL at 08:09

## 2019-09-26 RX ADMIN — OXYCODONE HYDROCHLORIDE 10 MG: 5 TABLET ORAL at 11:09

## 2019-09-26 NOTE — PLAN OF CARE
12:03 PM  In agreement and eager for DC.  Tolerating good PO and ambulating well.  Midline CDI and no vaginal bleeding to peripad.  Voiding with good UOP.     VU of DC instructions; paperwork and prescription passed; IV removed with cath tip intact, WNL;  Thorough DM - glucose  Control education provided, per R Dawood To be DCd home with family.  Will be escorted downstairs via  transport team once dressed and ready.  Free from falls or skin breakdown this hospital admission.

## 2019-09-26 NOTE — PLAN OF CARE
No significant events overnight. Remains free from fall, injury, and skin breakdown. A & O x 4. Voiding spontaneously. VSS on RA throughout the night. Positions self. Pain moderately controlled with PO/IV meds. Midline abd. dressing CDI. BS (+). Flatus (+). Plan of care reviewed with patient and all questions answered. Bed low, locked w/ bed alarm on. Call light within reach. Purposeful rounding performed. Resting comfortably in bed, no other complaints at this time.

## 2019-09-26 NOTE — PROGRESS NOTES
"[facility]  Obstetrics  Antepartum Progress Note    Patient Name: Kiana Ryder  MRN: [id number]  Admission Date: 9/24/2019  Hospital Length of Stay: 2 days  Attending Physician: Mary Iglesias MD  Primary Care Provider: Grady Billings MD    Subjective:     Principal Problem:Pregnancy    HPI:  Ms Ryder is a 33 yo at 17w4d who presents for scheduled ex lap with RSO for enlarging cystic adnexal mass in pregnancy.     Hospital Course:  09/25/2019 - POD 1 s/p ex lap, RSO, pelvic washings. Patient doing well. Tolerating PO without N/V. Noriega is draining straw colored urine. Has not ambulated. Pain well controlled with total 2mg IV hydromorphone and 10mg oxy IR required last night. Suppository indomethacin refused and changed to PO this morning. Patient states, "I need this noriega out because I need to get out of bed and walk around!" No other complaints  09/26/2019 - POD 2, patient doing well. Pain controlled with 2 mg hydromorphone IV and 10 mg oxy IR total overnight. Has passed flatus. Ambulating. Tolerating regular diet. Voiding spontaneously. No complaints.     Interval    POD 2, patient doing well. Pain controlled with 2 mg hydromorphone IV and 10 mg oxy IR total overnight. Has passed flatus. Ambulating. Tolerating regular diet. Voiding spontaneously. No complaints.      Objective:     Vital Signs (Most Recent):  Temp: 98.1 °F (36.7 °C) (09/26/19 0353)  Pulse: 85 (09/26/19 0353)  Resp: 18 (09/26/19 0353)  BP: 137/72 (09/26/19 0353)  SpO2: (!) 95 % (09/26/19 0353) Vital Signs (24h Range):  Temp:  [97.9 °F (36.6 °C)-99.3 °F (37.4 °C)] 98.1 °F (36.7 °C)  Pulse:  [] 85  Resp:  [16-20] 18  SpO2:  [89 %-98 %] 89 %  BP: (115-137)/(56-72) 137/72     Weight: 76.1 kg (167 lb 12.3 oz)  Body mass index is 28.8 kg/m².      Intake/Output Summary (Last 24 hours) at 9/26/2019 0612  Last data filed at 9/25/2019 1600  Gross per 24 hour   Intake 1960 ml   Output 1150 ml   Net 810 ml       Significant " Labs:  Recent Lab Results       19  2141   19  1640   19  0854   19  0830        POC Glucose       134     POCT Glucose 124 156 136             Physical Exam:   Constitutional: She appears well-developed and well-nourished. No distress.    HENT:   Head: Normocephalic and atraumatic.      Cardiovascular: Normal rate and regular rhythm.   Pulse Score: 2+   Pulmonary/Chest: Effort normal and breath sounds normal. No respiratory distress. She has no wheezes.        Abdominal: Soft. Bowel sounds are normal. She exhibits abdominal incision. She exhibits no distension. There is tenderness. There is no guarding.                 Neurological: She is alert.    Skin: Skin is warm and dry. She is not diaphoretic.    Psychiatric: She has a normal mood and affect.       Assessment/Plan:     34 y.o. female  at 17w6d for:    * Pregnancy  - s/p 24 hours of indocin  -  this morning    S/P ex lap with right salpingo oophorectomy  - Transition to oral pain meds  - Regular diet  - Encourage ambulation  - IS at bedside  - Strict I/O  - Lovenox while in house        Type 2 diabetes mellitus, without long-term current use of insulin  - Fasting glucose pending  - Started on 50% of home dose yesterday, tolerating well  - Refusing occasional insulin checks  - Restart home insulin at discharge  - Restart metformin at discharge          Leonardo Salgado MD  Obstetrics  [facility]

## 2019-09-26 NOTE — ASSESSMENT & PLAN NOTE
- Transition to oral pain meds  - Regular diet  - Encourage ambulation  - IS at bedside  - Strict I/O  - Lovenox while in house

## 2019-09-26 NOTE — PLAN OF CARE
Pt discharging home today, family to provide transportation.    Pt confirms no CM needs or barriers for discharge.     09/26/19 0756   Final Note   Assessment Type Final Discharge Note   Anticipated Discharge Disposition Home   What phone number can be called within the next 1-3 days to see how you are doing after discharge? [phone]   Hospital Follow Up  Appt(s) scheduled? Yes   Discharge plans and expectations educations in teach back method with documentation complete? Yes   Right Care Referral Info   Post Acute Recommendation No Care

## 2019-09-26 NOTE — DISCHARGE INSTRUCTIONS
Incision Care  Remember: Follow-up visits allow your healthcare provider to make sure your incision is healing well. Be sure to keep your appointments.     Stitches (sutures), surgical staples, special strips of surgical tape, or surgical skin glue may be used to close incisions. They also help stop bleeding and speed healing. To help your incision heal, follow the tips on this handout.  Home care  Tips for home care include the following:  · Always wash your hands before touching your incision.  · Keep your incision clean and dry.  · Avoid doing things that could cause dirt or sweat to get on your incision.  · Dont pick at scabs. They help protect the wound.  · Keep your incision out of water.  · Take a sponge bath to avoid getting your incision wet, unless your healthcare provider tells you otherwise.  · Ask your provider when can you take a shower or bathe.  · Ask your provider about the best way to keep your incision dry when bathing or showering.  · Pat stitches dry if they get wet. Dont rub.  · Leave the bandage (dressing) in place until you are told to remove it or change it. Change it only as directed, using clean hands.  · After the first 12 hours, change your dressing every 24 hours, or as directed by your healthcare provider.  · Change your dressing if it gets wet or soiled.  Care for specific closures  Follow these guidelines unless your healthcare provider tells you otherwise:  · Stitches or staples. Once you no longer need to keep these dry, clean the wound daily. First remove the bandage using clean hands. Then wash the area gently with soap and warm water. Use a wet cotton swab to loosen and remove any blood or crust that forms. After cleaning, put a thin layer of antibiotic ointment on. Then put on a new bandage.  · Skin glue. Dont put liquid, ointment, or cream on your wound while the glue is in place. Avoid activities that cause heavy sweating. Protect the wound from sunlight. Do not scratch,  rub, or pick at the glue. Do not put tape directly over the glue. The glue should peel off within 5 to 10 days.  · Surgical tape. Keep the area dry. If it gets wet, blot the area dry with a clean towel. Surgical tape usually falls off within 7 to 10 days. If it has not fallen off after 10 days, contact your healthcare provider before taking it off yourself. If you are told to remove the tape, put mineral oil or petroleum jelly on a cotton ball. Gently rub the tape until it is removed.  Changing your dressing  Leave the dressing (bandage) in place until you are told to remove it or change it. Follow the instructions below unless told otherwise by your healthcare provider:  · Always wash your hands before changing your dressing.  · After the first 48 hours, the incision wound usually will have closed. At this point, leave the incision uncovered and open to the air. If the incision has not closed keep it covered.  · Cover your incision only if your clothing is rubbing it or causing irritation.  · Change your dressing if it gets wet or soiled.  Follow-up care  Follow up with your healthcare provider to ask how long sutures or staples should be left in place. Be sure to return for stitch or staple removal as directed. If dissolving stitches were used in your mouth, these will not need to be removed. They should fall out or dissolve on their own.  If tape closures were used, remove them yourself when your provider recommends if they have not fallen off on their own. If skin glue was used, the glue will wear off by itself.  When to seek medical care  Call your healthcare provider if you have any of the following:  · More pain, redness, swelling, bleeding, or foul-smelling discharge around the incision area  · Fever of 100.4°F (38ºC) or higher, or as directed by your healthcare provider  · Shaking chills  · Vomiting or nausea that doesn't go away  · Numbness, coldness, or tingling around the incision area, or changes in  skin color  · Opening of the sutures or wound  · Stitches or staples come apart or fall out or surgical tape falls off before 7 days or as directed by your healthcare provider   Date Last Reviewed: 12/1/2016  © 1843-4934 [employer]. [address] [place] [address], [place] [address]. All rights reserved. This information is not intended as a substitute for professional medical care. Always follow your healthcare professional's instructions.

## 2019-09-26 NOTE — SUBJECTIVE & OBJECTIVE
Interval    POD 2, patient doing well. Pain controlled with 2 mg hydromorphone IV and 10 mg oxy IR total overnight. Has passed flatus. Ambulating. Tolerating regular diet. Voiding spontaneously. No complaints.      Objective:     Vital Signs (Most Recent):  Temp: 98.1 °F (36.7 °C) (09/26/19 0353)  Pulse: 85 (09/26/19 0353)  Resp: 18 (09/26/19 0353)  BP: 137/72 (09/26/19 0353)  SpO2: (!) 89 % (09/26/19 0353) Vital Signs (24h Range):  Temp:  [97.9 °F (36.6 °C)-99.3 °F (37.4 °C)] 98.1 °F (36.7 °C)  Pulse:  [] 85  Resp:  [16-20] 18  SpO2:  [89 %-98 %] 89 %  BP: (115-137)/(56-72) 137/72     Weight: 76.1 kg (167 lb 12.3 oz)  Body mass index is 28.8 kg/m².      Intake/Output Summary (Last 24 hours) at 9/26/2019 0612  Last data filed at 9/25/2019 1600  Gross per 24 hour   Intake 1960 ml   Output 1150 ml   Net 810 ml       Significant Labs:  Recent Lab Results       09/25/19  2141   09/25/19  1640   09/25/19  0854   09/25/19  0830        POC Glucose       134     POCT Glucose 124 156 136             Physical Exam:   Constitutional: She appears well-developed and well-nourished. No distress.    HENT:   Head: Normocephalic and atraumatic.      Cardiovascular: Normal rate and regular rhythm.   Pulse Score: 2+   Pulmonary/Chest: Effort normal and breath sounds normal. No respiratory distress. She has no wheezes.        Abdominal: Soft. Bowel sounds are normal. She exhibits abdominal incision. She exhibits no distension. There is tenderness. There is no guarding.                 Neurological: She is alert.    Skin: Skin is warm and dry. She is not diaphoretic.    Psychiatric: She has a normal mood and affect.

## 2019-09-26 NOTE — TELEPHONE ENCOUNTER
Called and spoke with pt. Pt is crying and stating that she is in extreme pain. Pt was discharged today and was given oxycodone capsules. The pharmacy does not carry capsules and pt is requesting something else. Informed pt I would send this message to Dr Iglesias and Sangeeta. Pt verbalized understanding and denies any further questions.

## 2019-09-27 ENCOUNTER — TELEPHONE (OUTPATIENT)
Dept: GYNECOLOGIC ONCOLOGY | Facility: OTHER | Age: 34
End: 2019-09-27

## 2019-09-27 ENCOUNTER — TELEPHONE (OUTPATIENT)
Dept: GYNECOLOGIC ONCOLOGY | Facility: CLINIC | Age: 34
End: 2019-09-27

## 2019-09-27 DIAGNOSIS — Z90.721 S/P RIGHT OOPHORECTOMY: ICD-10-CM

## 2019-09-27 RX ORDER — OXYCODONE HYDROCHLORIDE 5 MG/1
5 TABLET ORAL EVERY 4 HOURS PRN
Qty: 30 TABLET | Refills: 0 | Status: CANCELLED | OUTPATIENT
Start: 2019-09-27

## 2019-09-27 NOTE — TELEPHONE ENCOUNTER
----- Message from Bia Berg RN sent at 9/26/2019  4:12 PM CDT -----  Contact: Patient   This patient is pregnant and Dr Iglesias operated on Tuesday. Is there a reason she can only take capsule?     Bia  ----- Message -----  From: Ina Carr  Sent: 9/26/2019   3:08 PM CDT  To: Harris Hernandez Staff    Patient called and in extreme pain. The patient has gone to several pharmacy to have prescription oxyCODONE (OXY-IR) 5 mg Cap filled and because the prescription is for capsule the pharmacies the patient has been to doesn't carry it. The patient would like prescription called in to her nearest pharmacy. The patient can be reached at [phone].

## 2019-09-27 NOTE — TELEPHONE ENCOUNTER
Called and spoke with pt. Pt states that she was able to get pain medication. States she is still in pain but feels better than yesterday. Pt denies any further questions at this time.   ----- Message from Sangeeta Holloway NP sent at 9/27/2019  9:29 AM CDT -----  Contact: Patient   The prescription says tab.    ----- Message -----  From: Bia Berg RN  Sent: 9/26/2019   4:12 PM CDT  To: Sangeeta Holloway NP    This patient is pregnant and Dr Iglesias operated on Tuesday. Is there a reason she can only take capsule?     Bia  ----- Message -----  From: Ina Carr  Sent: 9/26/2019   3:08 PM CDT  To: Harris Hernandez Staff    Patient called and in extreme pain. The patient has gone to several pharmacy to have prescription oxyCODONE (OXY-IR) 5 mg Cap filled and because the prescription is for capsule the pharmacies the patient has been to doesn't carry it. The patient would like prescription called in to her nearest pharmacy. The patient can be reached at [phone].

## 2019-09-29 NOTE — OP NOTE
DATE OF PROCEDURE:  9/24/19     SURGEON:  Mary Iglesias M.D.     ASSISTANTS:  Leonardo Salgado MD (RES) and Vane Carney MD (FELLOW)      PREOPERATIVE DIAGNOSES:  1. Pelvic mass  2. Pregnant  3. Diabetes  4. Tobacco use     POSTOPERATIVE DIAGNOSES:  1. Pelvic mass  2. Pregnant  3. Diabetes  4. Tobacco use     PROCEDURES PERFORMED: Exploratory laparotomy, right salpingo-oophorectomy/pelvic mass resection      ANESTHESIA:  General endotracheal anesthesia.     SPECIMENS REMOVED:  1.  Right fallopian tube and ovary     ESTIMATED BLOOD LOSS:  10 mL.     COMPLICATIONS:  None.     FINDINGS:   1. 20cm complex cyst arising from the right ovary- frozen section benign cystadenoma  2. Gravid uterus, normal left fallopian tube and ovary  3. Normal appendix       PROCEDURE IN DETAIL:  The patient was taken to the Operating Room.  Informed consent had been obtained.  She underwent general endotracheal anesthesia  without difficulty and was prepped and draped in the normal sterile fashion in the dorsal lithotomy position.  Timeout was performed.  All parties agreed to the planned procedure. Perioperative antibiotics were administered.  Cloud catheter was placed under sterile conditions.  Midline vertical skin incision was made with a scalpel and carried down to the underlying layer of fascia. The fascia was incised in the midline and extended superiorly and inferiorly.  The rectus muscles were divided in the midline.  The peritoneum was identified, tented up with two tonsil clamps, and entered sharply with Metzenbaum scissors. The peritoneal incision was extended superiorly and inferiorly.      Pelvic washings were obtained. The large right ovarian cystic mass was exteriorized from the abdomen. We entered the retroperitoneum on the right. The right IP ligament was then skeletonized, doubly clamped with Edwin hysterectomy clamps, transected, and doubly suture ligated with good visualization of the ureter beneath. The peritoneal  attachments of the right ovary/mass were released to the level of the uterine cornua. The right uterine-ovarian artery and ligament were then doubly clamped, transected and suture ligated. This was accomplished with very little manipulation of the gravid uterus. Frozen section of the mass was benign ovarian cystadenoma.        The procedure was deemed complete.  The fascia was reapproximated with a #1 looped PDS in a running fashion.  Subcutaneous tissue was irrigated, cleared of all clot and debris, and rendered hemostatic. Subcutaneous adipose tissue was reapproximated with 2-0 vicryl suture in a running fashion. Skin incision was closed with 4-0 Monocryl in a subcuticular fashion and topped with sterile dressing. The patient tolerated the procedure well.  Sponge, lap, needle, and instrument counts were correct x2 as reported by the circulating nurse. She was awakened from anesthesia and taken to recovery in stable condition. Fetal heart tones were verified pre and post operatively.

## 2019-09-30 NOTE — DISCHARGE SUMMARY
[facility]  Obstetrics & Gynecology  Discharge Summary    Patient Name: Kiana Ryder  MRN: [id number]  Admission Date: 9/24/2019  Hospital Length of Stay: 2 days  Discharge Date and Time: 9/26  Attending Physician: No att. providers found   Discharging Provider: Leonardo Salgado MD  Primary Care Provider: Grady Billings MD    HPI: Patient admitted for scheduled ex lap with RSO    Hospital Course:   Patient meeting all post op milestones by post op day 1. Stayed overnight until Post op day 2 for pain control. Day 2 ambulating, voiding, passing flatus, tolerating PO. Stable for discharge.     Procedure(s) (LRB):  LAPAROTOMY, EXPLORATORY (N/A)  RIGHT SALPINGO-OOPHORECTOMY USO (Right)     Consults:     Significant Diagnostic Studies: Labs: All labs within the past 24 hours have been reviewed    Pending Diagnostic Studies:     None        Final Active Diagnoses:    Diagnosis Date Noted POA    PRINCIPAL PROBLEM:  Pregnancy [Z34.90] 09/22/2019 Not Applicable    S/P ex lap with right salpingo oophorectomy [Z90.721] 09/24/2019 Not Applicable    Type 2 diabetes mellitus, without long-term current use of insulin [E11.9] 08/07/2018 Yes      Problems Resolved During this Admission:    Diagnosis Date Noted Date Resolved POA    Pelvic mass [R19.00] 09/24/2019 09/26/2019 Yes        Discharged Condition: good    Disposition: Home or Self Care    Follow Up:  Follow-up Information     Vee Be MD In 2 weeks.    Specialty:  Maternal and Fetal Medicine  Why:  Post Op Visit  Contact information:  [address]  [facility] [address]  [phone]             Mary Iglesias MD In 2 weeks.    Specialty:  Gynecologic Oncology  Why:  Post Op Visit  Contact information:  [address]  [facility] [address]  [phone]                 Patient Instructions:      Diet Adult Regular     Other restrictions (specify):   Order Comments: No driving until pain free and off of pain meds  No baths for two  "weeks, only showers  No lifting anything more than 10 pounds for two weeks     Medications:  Reconciled Home Medications:      Medication List      CONTINUE taking these medications    aspirin 81 MG EC tablet  Commonly known as:  ECOTRIN  Take 81 mg by mouth once daily.     BD INSULIN SYRINGE ULTRA-FINE 0.5 mL 31 gauge x 5/16" Syrg  Generic drug:  insulin syringe-needle U-100     CLASSIC PRENATAL 28 mg iron- 800 mcg Tab  Generic drug:  prenatal vit no.126-iron-folic  Take 1 tablet by mouth once daily.     HumaLOG U-100 Insulin 100 unit/mL injection  Generic drug:  insulin lispro  Inject 5 Units into the skin 2 (two) times daily.     insulin NPH-insulin regular (70/30) 100 unit/mL (70-30) injection  Commonly known as:  NOVOLIN 70/30  Inject 10 Units into the skin 2 (two) times daily.     levothyroxine 25 MCG tablet  Commonly known as:  SYNTHROID  Take 25 mcg by mouth once daily.     metFORMIN 500 MG tablet  Commonly known as:  GLUCOPHAGE  Take 500 mg by mouth 2 (two) times daily with meals.     TRUE METRIX GLUCOSE TEST STRIP Strp  Generic drug:  blood sugar diagnostic     TRUEPLUS LANCETS 28 gauge Misc  Generic drug:  lancets            Leonardo Salgado MD  Obstetrics & Gynecology  [facility]  "

## 2019-10-07 ENCOUNTER — TELEPHONE (OUTPATIENT)
Dept: GYNECOLOGIC ONCOLOGY | Facility: CLINIC | Age: 34
End: 2019-10-07

## 2019-10-08 ENCOUNTER — HOSPITAL ENCOUNTER (EMERGENCY)
Facility: OTHER | Age: 34
Discharge: HOME | End: 2019-10-08
Attending: OBSTETRICS & GYNECOLOGY
Payer: MEDICAID

## 2019-10-08 ENCOUNTER — TELEPHONE (OUTPATIENT)
Dept: OBSTETRICS AND GYNECOLOGY | Facility: CLINIC | Age: 34
End: 2019-10-08

## 2019-10-08 ENCOUNTER — OFFICE VISIT (OUTPATIENT)
Dept: GYNECOLOGIC ONCOLOGY | Facility: CLINIC | Age: 34
End: 2019-10-08
Payer: MEDICAID

## 2019-10-08 ENCOUNTER — TELEPHONE (OUTPATIENT)
Dept: GYNECOLOGIC ONCOLOGY | Facility: CLINIC | Age: 34
End: 2019-10-08

## 2019-10-08 VITALS
BODY MASS INDEX: 28.15 KG/M2 | HEIGHT: 64 IN | WEIGHT: 164.88 LBS | SYSTOLIC BLOOD PRESSURE: 101 MMHG | HEART RATE: 127 BPM | DIASTOLIC BLOOD PRESSURE: 58 MMHG

## 2019-10-08 VITALS
RESPIRATION RATE: 18 BRPM | DIASTOLIC BLOOD PRESSURE: 62 MMHG | SYSTOLIC BLOOD PRESSURE: 130 MMHG | HEART RATE: 100 BPM | TEMPERATURE: 99 F

## 2019-10-08 DIAGNOSIS — D27.9 MUCINOUS CYSTADENOMA: Primary | ICD-10-CM

## 2019-10-08 DIAGNOSIS — Z34.90 PREGNANCY, UNSPECIFIED GESTATIONAL AGE: ICD-10-CM

## 2019-10-08 DIAGNOSIS — Z90.721 S/P RIGHT OOPHORECTOMY: ICD-10-CM

## 2019-10-08 DIAGNOSIS — Z34.92 PRESENCE OF FETAL HEART SOUNDS IN SECOND TRIMESTER: Primary | ICD-10-CM

## 2019-10-08 DIAGNOSIS — Z3A.19 19 WEEKS GESTATION OF PREGNANCY: ICD-10-CM

## 2019-10-08 PROCEDURE — 99283 PR EMERGENCY DEPT VISIT,LEVEL III: ICD-10-PCS | Mod: 24,,, | Performed by: OBSTETRICS & GYNECOLOGY

## 2019-10-08 PROCEDURE — 99999 PR PBB SHADOW E&M-EST. PATIENT-LVL III: ICD-10-PCS | Mod: PBBFAC,,, | Performed by: OBSTETRICS & GYNECOLOGY

## 2019-10-08 PROCEDURE — 99024 PR POST-OP FOLLOW-UP VISIT: ICD-10-PCS | Mod: S$PBB,TH,, | Performed by: NURSE PRACTITIONER

## 2019-10-08 PROCEDURE — 99213 OFFICE O/P EST LOW 20 MIN: CPT | Mod: PBBFAC,TH | Performed by: OBSTETRICS & GYNECOLOGY

## 2019-10-08 PROCEDURE — 99283 EMERGENCY DEPT VISIT LOW MDM: CPT | Mod: 24,,, | Performed by: OBSTETRICS & GYNECOLOGY

## 2019-10-08 PROCEDURE — 99024 POSTOP FOLLOW-UP VISIT: CPT | Mod: S$PBB,TH,, | Performed by: NURSE PRACTITIONER

## 2019-10-08 PROCEDURE — 99284 EMERGENCY DEPT VISIT MOD MDM: CPT | Mod: 27

## 2019-10-08 PROCEDURE — 99999 PR PBB SHADOW E&M-EST. PATIENT-LVL III: CPT | Mod: PBBFAC,,, | Performed by: OBSTETRICS & GYNECOLOGY

## 2019-10-08 RX ORDER — NAPROXEN 375 MG/1
TABLET ORAL
COMMUNITY
End: 2019-10-18

## 2019-10-08 RX ORDER — METHOCARBAMOL 500 MG/1
TABLET, FILM COATED ORAL
COMMUNITY
End: 2019-10-18

## 2019-10-08 RX ORDER — CITALOPRAM 20 MG/1
TABLET, FILM COATED ORAL
COMMUNITY
End: 2020-02-12

## 2019-10-08 RX ORDER — ALPRAZOLAM 0.5 MG/1
TABLET ORAL
COMMUNITY
End: 2019-10-18

## 2019-10-08 RX ORDER — FLUCONAZOLE 150 MG/1
TABLET ORAL
COMMUNITY
End: 2019-10-18

## 2019-10-08 RX ORDER — CITALOPRAM 10 MG/1
TABLET ORAL
COMMUNITY
End: 2021-05-18

## 2019-10-08 RX ORDER — PERMETHRIN 50 MG/G
CREAM TOPICAL
COMMUNITY
End: 2019-10-18

## 2019-10-08 RX ORDER — KETOROLAC TROMETHAMINE 10 MG/1
TABLET, FILM COATED ORAL
COMMUNITY
End: 2019-10-18

## 2019-10-08 RX ORDER — LEVOFLOXACIN 500 MG/1
TABLET, FILM COATED ORAL
COMMUNITY
End: 2019-10-18

## 2019-10-08 RX ORDER — ALPRAZOLAM 1 MG/1
TABLET ORAL
COMMUNITY
End: 2019-10-18

## 2019-10-08 RX ORDER — BENZPHETAMINE HYDROCHLORIDE 50 MG/1
TABLET ORAL
COMMUNITY
End: 2019-10-18

## 2019-10-08 RX ORDER — HUMAN INSULIN 100 [IU]/ML
INJECTION, SUSPENSION SUBCUTANEOUS
Refills: 5 | COMMUNITY
Start: 2019-09-20 | End: 2019-10-18

## 2019-10-08 RX ORDER — TRAMADOL HYDROCHLORIDE 50 MG/1
TABLET ORAL
COMMUNITY
End: 2019-10-18

## 2019-10-08 RX ORDER — BUTALBITAL, ASPIRIN, AND CAFFEINE 325; 50; 40 MG/1; MG/1; MG/1
CAPSULE ORAL
COMMUNITY
End: 2019-10-18

## 2019-10-08 RX ORDER — FLUCONAZOLE 100 MG/1
TABLET ORAL
COMMUNITY
End: 2019-10-18

## 2019-10-08 RX ORDER — AMOXICILLIN 500 MG/1
CAPSULE ORAL
COMMUNITY
End: 2019-10-18

## 2019-10-08 RX ORDER — AMOXICILLIN AND CLAVULANATE POTASSIUM 875; 125 MG/1; MG/1
TABLET, FILM COATED ORAL
COMMUNITY
End: 2019-10-18

## 2019-10-08 NOTE — ED PROVIDER NOTES
Encounter Date: 10/8/2019       History     Chief Complaint   Patient presents with    Decreased Fetal Movement     Kiana Ryder is a 34 y.o. [id number] at 19w4d presents to have heart tones established.   This IUP is complicated by excision of large ovarian cyst on 19.         Review of patient's allergies indicates:  No Known Allergies  Past Medical History:   Diagnosis Date    Diabetes mellitus     Pregnancy 2019    Thyroid disease     hypothyroidism     Past Surgical History:   Procedure Laterality Date     SECTION      CHOLECYSTECTOMY      DILATION AND CURETTAGE OF UTERUS      FOOT SURGERY Right     has a nail in the foot    SALPINGOOPHORECTOMY Right 2019    Procedure: RIGHT SALPINGO-OOPHORECTOMY USO;  Surgeon: Mary Iglesias MD;  Location: [facility];  Service: OB/GYN;  Laterality: Right;    TONSILLECTOMY       Family History   Problem Relation Age of Onset    COPD Mother      Social History     Tobacco Use    Smoking status: Current Every Day Smoker     Packs/day: 0.50     Years: 15.00     Pack years: 7.50     Types: Cigarettes    Smokeless tobacco: Never Used   Substance Use Topics    Alcohol use: Not Currently    Drug use: Yes     Types: Methamphetamines, Marijuana     Review of Systems   Constitutional: Negative for activity change, appetite change, chills and fever.   Eyes: Negative for visual disturbance.   Respiratory: Negative for cough and shortness of breath.    Cardiovascular: Negative for leg swelling.   Gastrointestinal: Negative for abdominal pain, constipation, diarrhea, nausea and vomiting.   Genitourinary: Negative for vaginal bleeding, vaginal discharge and vaginal pain.   Neurological: Negative for seizures, speech difficulty and headaches.       Physical Exam     Initial Vitals [10/08/19 1445]   BP Pulse Resp Temp SpO2   130/62 100 18 98.5 °F (36.9 °C) --      MAP       --         Physical Exam    Vitals reviewed.  Constitutional: She appears  well-developed and well-nourished. She is not diaphoretic. No distress.   HENT:   Head: Normocephalic and atraumatic.   Eyes: Conjunctivae are normal. Right eye exhibits no discharge. Left eye exhibits no discharge.   Neck: Neck supple.   Cardiovascular: Normal rate.   Pulmonary/Chest: Breath sounds normal. No respiratory distress.   Abdominal: Soft. There is no tenderness. There is no rebound and no guarding.   Gravid, fundus NT   Musculoskeletal: She exhibits no edema.   Skin: Skin is warm and dry. No rash noted. No erythema.   Psychiatric: She has a normal mood and affect. Her behavior is normal. Judgment and thought content normal.         ED Course   Procedures  Labs Reviewed - No data to display       Imaging Results    None                            ED Course as of Oct 17 0016   Tue Oct 08, 2019   9975 I evaluated Ms. Ryder and discussed case with Dr. Salgado.  Pt presents at 19w4d, she states that she has had increased d/c, but does not think she broke her water.  Also, at last doctors appt, they were unable to get FHT due to vertical skin incision for recent removal large ovarian cyst.   Here, Dr. Salgado confirmed FHT and adequate fluid on bsus.  Pt reassured and stable for d/c home    [HU]      ED Course User Index  [HU] Ginger Garcia DO     Clinical Impression:       ICD-10-CM ICD-9-CM   1. Presence of fetal heart sounds in second trimester Z34.92 V22.1   2. 19 weeks gestation of pregnancy Z3A.19 V22.2         Disposition:   Disposition: Discharged  Condition: Stable                        Ginger Garcia DO  10/17/19 0020

## 2019-10-08 NOTE — TELEPHONE ENCOUNTER
Pt states she will stop by our office to sign records release today. Adv pt will call to schedule appointment after speaking with physicians. Pt voiced understanding.

## 2019-10-08 NOTE — PROGRESS NOTES
Subjective:       Patient ID: Kiana Ryder is a 34 y.o. female.    Chief Complaint: Ovarian Cyst    HPI     Presents today for post operative evaluation, s/p Exploratory laparotomy, right salpingo-oophorectomy/pelvic mass resection on 9/24/2019. 19w 4d IUP today per DORON 2/28/2020.    Final pathology:   Mucinous cystadenoma  Ruptured hemorrhagic denuded cysts consistent with corpus luteum cyst  Negative for malignancy  Unremarkable fallopian tube     Reports surgical pain is much improved but she is concerned her water bag broke as she had a good bit of cramping overnight and underwear has been wet all day. Also concerned with decreased FM in the last week. Saw her OB in [place] on Tuesday and unable to find heart tones.    History:  Referred same day by M for large pelvic mass during pregnancy.      P1  Currently 16 4/7 wga, DORON 2/28/20.  MFM US demonstrated 18cm complex right adnexal cyst, thick septations, with papillary projections.  She reports that at 10 weeks gestation she had an US which showed a 7cm ovarian cyst.      Prior abdominal surgeries include c/s x 1 and laparoscopic cholecystectomy.   Family history significant for MGM with breast cancer.  Medical comorbidities include hypothyroidism, DM. hgb A1c 6.8    Review of Systems   Constitutional: Negative for appetite change, chills, diaphoresis, fatigue, fever and unexpected weight change.   Respiratory: Negative for cough, chest tightness, shortness of breath and wheezing.    Cardiovascular: Negative for chest pain, palpitations and leg swelling.   Gastrointestinal: Negative for abdominal distention, abdominal pain, blood in stool, constipation, diarrhea, nausea and vomiting.   Genitourinary: Positive for pelvic pain (cramping). Negative for difficulty urinating, dysuria, flank pain, frequency, hematuria, vaginal bleeding, vaginal discharge and vaginal pain.   Musculoskeletal: Negative for arthralgias and back pain.   Skin: Negative for color change  "and rash.   Neurological: Negative for dizziness, weakness, numbness and headaches.   Hematological: Negative for adenopathy.   Psychiatric/Behavioral: Negative for confusion and sleep disturbance. The patient is not nervous/anxious.        BP (!) 101/58   Pulse (!) 127   Ht 5' 4" (1.626 m)   Wt 74.8 kg (164 lb 14.5 oz)   BMI 28.31 kg/m²     Objective:      Physical Exam   Constitutional: She is oriented to person, place, and time. She appears well-developed and well-nourished. No distress.   HENT:   Head: Normocephalic and atraumatic.   Eyes: No scleral icterus.   Neck: Normal range of motion.   Pulmonary/Chest: Effort normal. No respiratory distress. No breast tenderness.   Abdominal: Soft. There is no tenderness.   IUP 19w 4d, ML incision C/D/I   Genitourinary: No breast tenderness. Pelvic exam was performed with patient supine. There is no rash, tenderness or lesion on the right labia. There is no rash, tenderness or lesion on the left labia. Cervix exhibits discharge. Cervix exhibits no motion tenderness. No bleeding in the vagina. Vaginal discharge found.   Genitourinary Comments: SSE with no pooling, + discharge   Musculoskeletal: Normal range of motion. She exhibits no edema or tenderness.   Neurological: She is alert and oriented to person, place, and time.   Skin: Skin is warm and dry. No rash noted. She is not diaphoretic. No erythema. No pallor.   Psychiatric: She has a normal mood and affect. Her behavior is normal.       Assessment:       1. Mucinous cystadenoma    2. S/P ex lap with right salpingo oophorectomy    3. Pregnancy, unspecified gestational age        Plan:       Benign pathology reviewed with patient  Evaluated with Dr. Petersen  No obvious ROM  Recommend OB ED for evaluation of fetal well-being  "

## 2019-10-08 NOTE — TELEPHONE ENCOUNTER
----- Message from Raysa Luther MA sent at 10/8/2019  1:54 PM CDT -----  Contact: Self      ----- Message -----  From: Chica Faust  Sent: 10/8/2019   1:43 PM CDT  To: Belen Mckee    Kiana Ryder  MRN: [id number]  Home Phone      [phone]  Work Phone      [phone]  Mobile          [phone]    Patient Care Team:  Grady Billings MD as PCP - General (Internal Medicine)  OB? Yes, 19w4d  What phone number can you be reached at? [phone]  Message:   Pt would like to transfer care, please return call.

## 2019-10-08 NOTE — DISCHARGE INSTRUCTIONS
Call clinic or L & D after hours at [phone] for vaginal bleeding, leakage of fluids, contractions 4-5 in one hour, decreased fetal movements ( 10 kicks in 2 hours), headache not relieved by Tylenol, blurry vision, or temp of 100.4 or greater.  Begin doing fetal kick counts, at least 10 movements in 2 hours starting at 28 weeks gestation.  Keep next clinic appointment

## 2019-10-09 ENCOUNTER — TELEPHONE (OUTPATIENT)
Dept: OBSTETRICS AND GYNECOLOGY | Facility: CLINIC | Age: 34
End: 2019-10-09

## 2019-10-09 NOTE — TELEPHONE ENCOUNTER
Records release forms signed to send to Dr Robison and Dr Frazier. Pt scheduled appointment. Voiced understanding.

## 2019-10-09 NOTE — TELEPHONE ENCOUNTER
Records request faxed to Dr Robison [phone] for pt records from Sept 12 - Sept 30. Fax confirmation received. Awaiting records.

## 2019-10-09 NOTE — TELEPHONE ENCOUNTER
Records request faxed to Dr Frazier in [place] [phone] for pt records from all - all. Fax confirmation received. Awaiting records.

## 2019-10-14 ENCOUNTER — TELEPHONE (OUTPATIENT)
Dept: OBSTETRICS AND GYNECOLOGY | Facility: CLINIC | Age: 34
End: 2019-10-14

## 2019-10-14 NOTE — TELEPHONE ENCOUNTER
Medical records received from Dr. Robison and Dr. Frazier. Placed on Dr. Zak griffith for review.

## 2019-10-18 ENCOUNTER — OFFICE VISIT (OUTPATIENT)
Dept: OBSTETRICS AND GYNECOLOGY | Facility: CLINIC | Age: 34
End: 2019-10-18
Payer: MEDICAID

## 2019-10-18 VITALS
BODY MASS INDEX: 28.96 KG/M2 | DIASTOLIC BLOOD PRESSURE: 72 MMHG | HEART RATE: 68 BPM | SYSTOLIC BLOOD PRESSURE: 120 MMHG | HEIGHT: 64 IN | RESPIRATION RATE: 14 BRPM | WEIGHT: 169.63 LBS

## 2019-10-18 DIAGNOSIS — Z34.92 ENCOUNTER FOR PREGNANCY RELATED EXAMINATION IN SECOND TRIMESTER: Primary | ICD-10-CM

## 2019-10-18 DIAGNOSIS — O34.219 HISTORY OF CESAREAN DELIVERY AFFECTING PREGNANCY: ICD-10-CM

## 2019-10-18 DIAGNOSIS — O24.912 DIABETES MELLITUS AFFECTING PREGNANCY IN SECOND TRIMESTER: ICD-10-CM

## 2019-10-18 DIAGNOSIS — O99.282 THYROID DISEASE DURING PREGNANCY, SECOND TRIMESTER: ICD-10-CM

## 2019-10-18 DIAGNOSIS — E07.9 THYROID DISEASE DURING PREGNANCY, SECOND TRIMESTER: ICD-10-CM

## 2019-10-18 PROCEDURE — 99204 OFFICE O/P NEW MOD 45 MIN: CPT | Mod: S$PBB,TH,, | Performed by: OBSTETRICS & GYNECOLOGY

## 2019-10-18 PROCEDURE — 99204 PR OFFICE/OUTPT VISIT, NEW, LEVL IV, 45-59 MIN: ICD-10-PCS | Mod: S$PBB,TH,, | Performed by: OBSTETRICS & GYNECOLOGY

## 2019-10-18 PROCEDURE — 99213 OFFICE O/P EST LOW 20 MIN: CPT | Mod: PBBFAC,TH | Performed by: OBSTETRICS & GYNECOLOGY

## 2019-10-18 PROCEDURE — 99999 PR PBB SHADOW E&M-EST. PATIENT-LVL III: ICD-10-PCS | Mod: PBBFAC,,, | Performed by: OBSTETRICS & GYNECOLOGY

## 2019-10-18 PROCEDURE — 99999 PR PBB SHADOW E&M-EST. PATIENT-LVL III: CPT | Mod: PBBFAC,,, | Performed by: OBSTETRICS & GYNECOLOGY

## 2019-10-18 RX ORDER — INSULIN ASPART 100 [IU]/ML
7 INJECTION, SOLUTION INTRAVENOUS; SUBCUTANEOUS 2 TIMES DAILY
Qty: 9 ML | Refills: 11 | Status: SHIPPED | OUTPATIENT
Start: 2019-10-18 | End: 2019-12-23

## 2019-10-18 RX ORDER — INSULIN ASPART 100 [IU]/ML
7 INJECTION, SOLUTION INTRAVENOUS; SUBCUTANEOUS
Status: DISCONTINUED | OUTPATIENT
Start: 2019-10-18 | End: 2019-10-18

## 2019-10-18 NOTE — PROGRESS NOTES
Subjective:   Patient ID: iKana Ryder is a 34 y.o. y.o. female.     Chief Complaint: Missed Menses       History of Present Illness:    Kiana presents today to establish OB care. No LMP recorded. Patient is pregnant. Pt is 21 WGA who has had prenatal care in [facility].  Pregnancy has been complicated by DM2 on Metformin and Insulin as well as thyroid disorder on Synthroid.  She also had an exlap with RSO for a benign mucinous cystadenoma.   She is recovering well from this surgery.     She reports that her insulin regimen is the following:   AM NPH 12  Regular 7  PM NPH 12  Regular 7  However, she has not been able to take this in a week because she is unable to give herself needle injections.  She is able to do insulin pens though.      She is established with [facility] with the following recs:  Growth scans monthly starting at 28  Fetal echo scheduled   testing (weekly BPP) starting at 32  ASA 81 (pt taking)  TSH monthly      Past Medical History:   Diagnosis Date    Diabetes mellitus     Pregnancy 2019    Thyroid disease     hypothyroidism     Past Surgical History:   Procedure Laterality Date     SECTION      CHOLECYSTECTOMY      DILATION AND CURETTAGE OF UTERUS      FOOT SURGERY Right     has a nail in the foot    SALPINGOOPHORECTOMY Right 2019    Procedure: RIGHT SALPINGO-OOPHORECTOMY USO;  Surgeon: Mary Iglesias MD;  Location: [facility];  Service: OB/GYN;  Laterality: Right;    TONSILLECTOMY       Social History     Socioeconomic History    Marital status:      Spouse name: Not on file    Number of children: Not on file    Years of education: Not on file    Highest education level: Not on file   Occupational History    Not on file   Social Needs    Financial resource strain: Not on file    Food insecurity:     Worry: Not on file     Inability: Not on file    Transportation needs:     Medical: Not on file     Non-medical: Not on file    Tobacco Use    Smoking status: Current Every Day Smoker     Packs/day: 0.50     Years: 15.00     Pack years: 7.50     Types: Cigarettes    Smokeless tobacco: Never Used   Substance and Sexual Activity    Alcohol use: Not Currently    Drug use: Yes     Types: Methamphetamines, Marijuana     Comment: last use over a year    Sexual activity: Yes     Partners: Male     Birth control/protection: None     Comment:    Lifestyle    Physical activity:     Days per week: Not on file     Minutes per session: Not on file    Stress: Not on file   Relationships    Social connections:     Talks on phone: Not on file     Gets together: Not on file     Attends Jain service: Not on file     Active member of club or organization: Not on file     Attends meetings of clubs or organizations: Not on file     Relationship status: Not on file   Other Topics Concern    Not on file   Social History Narrative    Not on file     Family History   Problem Relation Age of Onset    COPD Mother     Breast cancer Maternal Grandmother     Ovarian cancer Neg Hx     Stroke Neg Hx     Colon cancer Neg Hx      OB History    Para Term  AB Living   3 1 1 0 1 1   SAB TAB Ectopic Multiple Live Births   1 0 0 0 1      # Outcome Date GA Lbr Umair/2nd Weight Sex Delivery Anes PTL Lv   3 Current            2 Term 10/09/02 38w0d  3.374 kg (7 lb 7 oz) F CS-LTranv  N FERNANDO      Complications: Breech birth   1 2000 8w0d             Birth Comments: D&C         ROS:   Review of Systems   Constitutional: Negative for chills, diaphoresis, fatigue, fever and unexpected weight change.   HENT: Negative for congestion, hearing loss, rhinorrhea and sore throat.    Eyes: Negative for pain, discharge and visual disturbance.   Respiratory: Negative for apnea, cough, shortness of breath and wheezing.    Cardiovascular: Negative for chest pain, palpitations and leg swelling.   Gastrointestinal: Negative for abdominal pain, constipation,  diarrhea, nausea and vomiting.   Endocrine: Negative for cold intolerance and heat intolerance.   Genitourinary: Negative for difficulty urinating, dyspareunia, dysuria, flank pain, frequency, genital sores, hematuria, menstrual problem, pelvic pain, vaginal bleeding, vaginal discharge and vaginal pain.   Musculoskeletal: Negative for arthralgias, back pain and joint swelling.   Skin: Negative for rash.   Neurological: Negative for dizziness, weakness, light-headedness, numbness and headaches.   Psychiatric/Behavioral: Negative for agitation and confusion. The patient is not nervous/anxious.            Objective:   Vital Signs:  Vitals:    10/18/19 1638   BP: 120/72   Pulse: 68   Resp: 14     Physical Exam   Constitutional: She is oriented to person, place, and time. She appears well-developed and well-nourished. No distress.   HENT:   Head: Normocephalic and atraumatic.   Eyes: Conjunctivae are normal.   Neck: Normal range of motion. Neck supple.   Pulmonary/Chest: Effort normal. No respiratory distress.   Abdominal:   Healing vertical midline incision.  FH: 20 cm  FHTs: 143 bpm   Musculoskeletal: Normal range of motion. She exhibits no edema or deformity.   Neurological: She is alert and oriented to person, place, and time.   Skin: Skin is warm and dry.   Psychiatric: She has a normal mood and affect. Her behavior is normal. Judgment and thought content normal.   Vitals reviewed.          Assessment:      1. Encounter for pregnancy related examination in second trimester    2. Diabetes mellitus affecting pregnancy in second trimester    3. Thyroid disease during pregnancy, second trimester    4. History of  delivery affecting pregnancy          Plan:        Encounter for pregnancy related examination in second trimester    Diabetes mellitus affecting pregnancy in second trimester  -     insulin aspart U-100 injection 7 Units  -     insulin detemir U-100 (LEVEMIR FLEXTOUCH U-100 INSULN) 100 unit/mL (3 mL)  SubQ InPn pen; Inject 12 Units into the skin 2 (two) times daily. With breakfast and in the evening  Dispense: 12 mL; Refill: 11  -     insulin aspart U-100 (NOVOLOG) 100 unit/mL (3 mL) InPn pen; Inject 7 Units into the skin 2 (two) times daily. Inject before breakfast and before dinner  Dispense: 9 mL; Refill: 11    Thyroid disease during pregnancy, second trimester    History of  delivery affecting pregnancy        1. Insulin pens sent to [facility] to help with compliance.  Insulin log sheets given.  Pt to bring logs to clinic.  Discussed recs for fasting <95 and  2hr pp <120.  2. Prenatal labs have been obtained and records have all been reviewed from [place] and [place] clinics.  3. MFM follow up ultrasound scheduled  4. Follow up in 4 weeks after.      Initial ob packet supplied to patient. Contents supplied and discussed include medications safe in pregnancy, pregnancy A to Z.

## 2019-10-21 ENCOUNTER — TELEPHONE (OUTPATIENT)
Dept: OBSTETRICS AND GYNECOLOGY | Facility: CLINIC | Age: 34
End: 2019-10-21

## 2019-10-21 NOTE — TELEPHONE ENCOUNTER
B+ - Immune  HIV - negative  RPR - non reactive  Hep C - Negative  Hep B - negative  +THC  GBS Bacterima  TSH - WNL  CF - Negative  cFDNA - Negative  24 hour urine 149 (9/20/2019)  A1C - 6.6 (8/2019)  HPV - Positive (type 33) (9/2019)  Ascus    MFM recs:  Fetal echo  Monthly growth @ 28w  Weekly BPP @ 32w  ASA 81  TSH q 4-6w

## 2019-11-22 ENCOUNTER — ROUTINE PRENATAL (OUTPATIENT)
Dept: OBSTETRICS AND GYNECOLOGY | Facility: CLINIC | Age: 34
End: 2019-11-22
Payer: MEDICAID

## 2019-11-22 VITALS
BODY MASS INDEX: 30.21 KG/M2 | SYSTOLIC BLOOD PRESSURE: 126 MMHG | HEART RATE: 106 BPM | WEIGHT: 176 LBS | DIASTOLIC BLOOD PRESSURE: 62 MMHG

## 2019-11-22 DIAGNOSIS — O24.912 DIABETES MELLITUS AFFECTING PREGNANCY, SECOND TRIMESTER: ICD-10-CM

## 2019-11-22 DIAGNOSIS — Z3A.26 26 WEEKS GESTATION OF PREGNANCY: Primary | ICD-10-CM

## 2019-11-22 DIAGNOSIS — E07.9 THYROID DISEASE AFFECTING PREGNANCY: ICD-10-CM

## 2019-11-22 DIAGNOSIS — O99.280 THYROID DISEASE AFFECTING PREGNANCY: ICD-10-CM

## 2019-11-22 PROCEDURE — 99999 PR PBB SHADOW E&M-EST. PATIENT-LVL III: ICD-10-PCS | Mod: PBBFAC,,, | Performed by: OBSTETRICS & GYNECOLOGY

## 2019-11-22 PROCEDURE — 99999 PR PBB SHADOW E&M-EST. PATIENT-LVL III: CPT | Mod: PBBFAC,,, | Performed by: OBSTETRICS & GYNECOLOGY

## 2019-11-22 PROCEDURE — 99213 OFFICE O/P EST LOW 20 MIN: CPT | Mod: TH,S$PBB,, | Performed by: OBSTETRICS & GYNECOLOGY

## 2019-11-22 PROCEDURE — 99213 PR OFFICE/OUTPT VISIT, EST, LEVL III, 20-29 MIN: ICD-10-PCS | Mod: TH,S$PBB,, | Performed by: OBSTETRICS & GYNECOLOGY

## 2019-11-22 PROCEDURE — 99213 OFFICE O/P EST LOW 20 MIN: CPT | Mod: PBBFAC,TH | Performed by: OBSTETRICS & GYNECOLOGY

## 2019-11-22 RX ORDER — METFORMIN HYDROCHLORIDE 1000 MG/1
1000 TABLET ORAL 2 TIMES DAILY WITH MEALS
Qty: 60 TABLET | Refills: 11 | Status: ON HOLD | OUTPATIENT
Start: 2019-11-22 | End: 2023-09-04 | Stop reason: HOSPADM

## 2019-11-22 NOTE — PROGRESS NOTES
Patient states that she is unable to take her insulin.  Even using the small needles, she cannot inject this and does not have anyone that can help give injections.    Will increase Metformin to 1000 BID. Reports highest BG not taking insulin ranges 105-180.   Denies vaginal bleeding or LOF. Good FM. A1C/HIV/CBC ordered today.   labor precautions discussed with patient. RTC in 2 weeks.      Coffective counseling sheet Get Ready discussed with mother. Reinforced avoiding induction of labor unless medically indicated as well as comfort measures during labor.  Encouraged mother to download [employer] mobile sydni if she has not already done so. Mother verbalizes understanding.

## 2019-12-06 ENCOUNTER — TELEPHONE (OUTPATIENT)
Dept: OBSTETRICS AND GYNECOLOGY | Facility: CLINIC | Age: 34
End: 2019-12-06

## 2019-12-06 ENCOUNTER — LAB VISIT (OUTPATIENT)
Dept: LAB | Facility: HOSPITAL | Age: 34
End: 2019-12-06
Attending: OBSTETRICS & GYNECOLOGY
Payer: MEDICAID

## 2019-12-06 ENCOUNTER — PROCEDURE VISIT (OUTPATIENT)
Dept: OBSTETRICS AND GYNECOLOGY | Facility: CLINIC | Age: 34
End: 2019-12-06
Payer: MEDICAID

## 2019-12-06 ENCOUNTER — ROUTINE PRENATAL (OUTPATIENT)
Dept: OBSTETRICS AND GYNECOLOGY | Facility: CLINIC | Age: 34
End: 2019-12-06
Payer: MEDICAID

## 2019-12-06 VITALS
WEIGHT: 173.63 LBS | DIASTOLIC BLOOD PRESSURE: 74 MMHG | SYSTOLIC BLOOD PRESSURE: 106 MMHG | HEART RATE: 86 BPM | BODY MASS INDEX: 29.8 KG/M2

## 2019-12-06 DIAGNOSIS — Z3A.28 28 WEEKS GESTATION OF PREGNANCY: Primary | ICD-10-CM

## 2019-12-06 DIAGNOSIS — O24.912 DIABETES MELLITUS AFFECTING PREGNANCY, SECOND TRIMESTER: ICD-10-CM

## 2019-12-06 DIAGNOSIS — Z3A.26 26 WEEKS GESTATION OF PREGNANCY: ICD-10-CM

## 2019-12-06 DIAGNOSIS — O34.219 HISTORY OF CESAREAN SECTION COMPLICATING PREGNANCY: ICD-10-CM

## 2019-12-06 DIAGNOSIS — Z23 NEED FOR DIPHTHERIA-TETANUS-PERTUSSIS (TDAP) VACCINE: ICD-10-CM

## 2019-12-06 DIAGNOSIS — O09.523 AMA (ADVANCED MATERNAL AGE) MULTIGRAVIDA 35+, THIRD TRIMESTER: ICD-10-CM

## 2019-12-06 DIAGNOSIS — O99.280 THYROID DISEASE AFFECTING PREGNANCY: ICD-10-CM

## 2019-12-06 DIAGNOSIS — O24.319 PRE-EXISTING DIABETES MELLITUS AFFECTING PREGNANCY, ANTEPARTUM: ICD-10-CM

## 2019-12-06 DIAGNOSIS — E07.9 THYROID DISEASE AFFECTING PREGNANCY: ICD-10-CM

## 2019-12-06 PROBLEM — O24.913 DIABETES MELLITUS AFFECTING PREGNANCY IN THIRD TRIMESTER: Status: ACTIVE | Noted: 2019-10-18

## 2019-12-06 LAB
BASOPHILS # BLD AUTO: 0.04 K/UL (ref 0–0.2)
BASOPHILS NFR BLD: 0.3 % (ref 0–1.9)
DIFFERENTIAL METHOD: ABNORMAL
EOSINOPHIL # BLD AUTO: 0.2 K/UL (ref 0–0.5)
EOSINOPHIL NFR BLD: 1.5 % (ref 0–8)
ERYTHROCYTE [DISTWIDTH] IN BLOOD BY AUTOMATED COUNT: 14.6 % (ref 11.5–14.5)
HCT VFR BLD AUTO: 36.9 % (ref 37–48.5)
HGB BLD-MCNC: 12.3 G/DL (ref 12–16)
IMM GRANULOCYTES # BLD AUTO: 0.1 K/UL (ref 0–0.04)
IMM GRANULOCYTES NFR BLD AUTO: 0.7 % (ref 0–0.5)
LYMPHOCYTES # BLD AUTO: 3.1 K/UL (ref 1–4.8)
LYMPHOCYTES NFR BLD: 22.7 % (ref 18–48)
MCH RBC QN AUTO: 29.3 PG (ref 27–31)
MCHC RBC AUTO-ENTMCNC: 33.3 G/DL (ref 32–36)
MCV RBC AUTO: 88 FL (ref 82–98)
MONOCYTES # BLD AUTO: 1.6 K/UL (ref 0.3–1)
MONOCYTES NFR BLD: 11.3 % (ref 4–15)
NEUTROPHILS # BLD AUTO: 8.7 K/UL (ref 1.8–7.7)
NEUTROPHILS NFR BLD: 63.5 % (ref 38–73)
NRBC BLD-RTO: 0 /100 WBC
PLATELET # BLD AUTO: 200 K/UL (ref 150–350)
PMV BLD AUTO: 12.4 FL (ref 9.2–12.9)
RBC # BLD AUTO: 4.2 M/UL (ref 4–5.4)
TSH SERPL DL<=0.005 MIU/L-ACNC: 1.79 UIU/ML (ref 0.4–4)
WBC # BLD AUTO: 13.68 K/UL (ref 3.9–12.7)

## 2019-12-06 PROCEDURE — 85025 COMPLETE CBC W/AUTO DIFF WBC: CPT

## 2019-12-06 PROCEDURE — 99214 OFFICE O/P EST MOD 30 MIN: CPT | Mod: PBBFAC,TH,25 | Performed by: OBSTETRICS & GYNECOLOGY

## 2019-12-06 PROCEDURE — 36415 COLL VENOUS BLD VENIPUNCTURE: CPT

## 2019-12-06 PROCEDURE — 76816 OB US FOLLOW-UP PER FETUS: CPT | Mod: 26,S$PBB,, | Performed by: OBSTETRICS & GYNECOLOGY

## 2019-12-06 PROCEDURE — 99213 PR OFFICE/OUTPT VISIT, EST, LEVL III, 20-29 MIN: ICD-10-PCS | Mod: TH,25,S$PBB, | Performed by: OBSTETRICS & GYNECOLOGY

## 2019-12-06 PROCEDURE — 86703 HIV-1/HIV-2 1 RESULT ANTBDY: CPT

## 2019-12-06 PROCEDURE — 83036 HEMOGLOBIN GLYCOSYLATED A1C: CPT

## 2019-12-06 PROCEDURE — 76816 PR  US,PREGNANT UTERUS,F/U,TRANSABD APP: ICD-10-PCS | Mod: 26,S$PBB,, | Performed by: OBSTETRICS & GYNECOLOGY

## 2019-12-06 PROCEDURE — 99999 PR PBB SHADOW E&M-EST. PATIENT-LVL IV: ICD-10-PCS | Mod: PBBFAC,,, | Performed by: OBSTETRICS & GYNECOLOGY

## 2019-12-06 PROCEDURE — 99999 PR PBB SHADOW E&M-EST. PATIENT-LVL IV: CPT | Mod: PBBFAC,,, | Performed by: OBSTETRICS & GYNECOLOGY

## 2019-12-06 PROCEDURE — 84443 ASSAY THYROID STIM HORMONE: CPT

## 2019-12-06 PROCEDURE — 99213 OFFICE O/P EST LOW 20 MIN: CPT | Mod: TH,25,S$PBB, | Performed by: OBSTETRICS & GYNECOLOGY

## 2019-12-06 PROCEDURE — 90471 IMMUNIZATION ADMIN: CPT | Mod: PBBFAC

## 2019-12-06 PROCEDURE — 76816 OB US FOLLOW-UP PER FETUS: CPT | Mod: PBBFAC | Performed by: OBSTETRICS & GYNECOLOGY

## 2019-12-06 NOTE — TELEPHONE ENCOUNTER
BUDDY w/ BTL scheduled for 38wga per Dr. Crespo, 2/14/2020 with preop and preadmit appt's schedule.  Appt notification handed to pt.  Pt tammy/maura Esteban in surgery notified of date and time.  BTL form signed.  Case request number [id number].

## 2019-12-06 NOTE — PROGRESS NOTES
Patient with no complaints. Denies vaginal bleeding or contractions. Good FM. Tdap discussed and ordered today.  Discussed fetal kick count instructions with patient to monitor fetal movement. RTC in 2 weeks.  Schedule repeat  with BTL at 38 weeks.   Pt has not been checking BG. Discussed why monitoring glucose in pregnancy is imperative and can have major complications with her and baby.      Coffective counseling sheet Fall In Love discussed with mother. Reinforced immediate skin to skin, the magic first hour, importance of the first feeding and delaying routine procedures. Encouraged mother to download Coffect[employer] mobile sydni if she has not already done so. Mother verbalizes understanding.

## 2019-12-07 LAB
ESTIMATED AVG GLUCOSE: 180 MG/DL (ref 68–131)
HBA1C MFR BLD HPLC: 7.9 % (ref 4–5.6)

## 2019-12-09 LAB — HIV 1+2 AB+HIV1 P24 AG SERPL QL IA: NEGATIVE

## 2019-12-23 ENCOUNTER — ROUTINE PRENATAL (OUTPATIENT)
Dept: OBSTETRICS AND GYNECOLOGY | Facility: CLINIC | Age: 34
End: 2019-12-23
Payer: MEDICAID

## 2019-12-23 VITALS
BODY MASS INDEX: 30 KG/M2 | SYSTOLIC BLOOD PRESSURE: 130 MMHG | HEART RATE: 110 BPM | DIASTOLIC BLOOD PRESSURE: 72 MMHG | WEIGHT: 174.81 LBS

## 2019-12-23 DIAGNOSIS — O09.523 MULTIGRAVIDA OF ADVANCED MATERNAL AGE IN THIRD TRIMESTER: ICD-10-CM

## 2019-12-23 DIAGNOSIS — Z3A.30 30 WEEKS GESTATION OF PREGNANCY: Primary | ICD-10-CM

## 2019-12-23 DIAGNOSIS — E11.65 UNCONTROLLED TYPE 2 DIABETES MELLITUS WITH HYPERGLYCEMIA: ICD-10-CM

## 2019-12-23 DIAGNOSIS — O24.913 DIABETES MELLITUS AFFECTING PREGNANCY IN THIRD TRIMESTER: ICD-10-CM

## 2019-12-23 PROCEDURE — 99213 PR OFFICE/OUTPT VISIT, EST, LEVL III, 20-29 MIN: ICD-10-PCS | Mod: TH,S$PBB,, | Performed by: OBSTETRICS & GYNECOLOGY

## 2019-12-23 PROCEDURE — 99213 OFFICE O/P EST LOW 20 MIN: CPT | Mod: TH,S$PBB,, | Performed by: OBSTETRICS & GYNECOLOGY

## 2019-12-23 PROCEDURE — 99999 PR PBB SHADOW E&M-EST. PATIENT-LVL III: CPT | Mod: PBBFAC,,, | Performed by: OBSTETRICS & GYNECOLOGY

## 2019-12-23 PROCEDURE — 99213 OFFICE O/P EST LOW 20 MIN: CPT | Mod: PBBFAC,TH | Performed by: OBSTETRICS & GYNECOLOGY

## 2019-12-23 PROCEDURE — 99999 PR PBB SHADOW E&M-EST. PATIENT-LVL III: ICD-10-PCS | Mod: PBBFAC,,, | Performed by: OBSTETRICS & GYNECOLOGY

## 2019-12-23 RX ORDER — PEN NEEDLE, DIABETIC 29 G X1/2"
1 NEEDLE, DISPOSABLE MISCELLANEOUS DAILY
COMMUNITY
Start: 2019-12-23 | End: 2024-01-22

## 2019-12-23 RX ORDER — INSULIN GLARGINE 100 [IU]/ML
16 INJECTION, SOLUTION SUBCUTANEOUS NIGHTLY
Qty: 10 ML | Refills: 11 | Status: SHIPPED | OUTPATIENT
Start: 2019-12-23 | End: 2020-01-10

## 2019-12-23 NOTE — PROGRESS NOTES
Patient reports that her glucose has been high 180-200, but did not bring her logs.  We discussed that her A1C increased significantly.  She was unable to tolerate insulin injections.  We discussed that this is imperative for her and her baby.  Will try to do Lantus once a day with Metformin to get some improvement in BG numbers.      Denies vaginal bleeding or contractions. Good FM. Growth scan and BPP ordered for next visit in 2 weeks.     Coffective counseling sheet Learn Your Baby and Protect Breastfeeding discussed with mother. Instructed regarding feeding cues and methods to calm baby. Encouraged mother to download [employer] mobile sydni if she has not already done so.  Mother verbalized understanding.

## 2020-01-10 ENCOUNTER — ROUTINE PRENATAL (OUTPATIENT)
Dept: OBSTETRICS AND GYNECOLOGY | Facility: CLINIC | Age: 35
End: 2020-01-10
Payer: MEDICAID

## 2020-01-10 ENCOUNTER — PROCEDURE VISIT (OUTPATIENT)
Dept: OBSTETRICS AND GYNECOLOGY | Facility: CLINIC | Age: 35
End: 2020-01-10
Payer: MEDICAID

## 2020-01-10 VITALS
SYSTOLIC BLOOD PRESSURE: 142 MMHG | DIASTOLIC BLOOD PRESSURE: 80 MMHG | BODY MASS INDEX: 31.17 KG/M2 | HEART RATE: 93 BPM | WEIGHT: 181.63 LBS

## 2020-01-10 DIAGNOSIS — O24.913 DIABETES MELLITUS AFFECTING PREGNANCY IN THIRD TRIMESTER: ICD-10-CM

## 2020-01-10 DIAGNOSIS — O34.219 HISTORY OF CESAREAN DELIVERY AFFECTING PREGNANCY: ICD-10-CM

## 2020-01-10 DIAGNOSIS — O09.523 MULTIGRAVIDA OF ADVANCED MATERNAL AGE IN THIRD TRIMESTER: ICD-10-CM

## 2020-01-10 DIAGNOSIS — E11.65 UNCONTROLLED TYPE 2 DIABETES MELLITUS WITH HYPERGLYCEMIA: ICD-10-CM

## 2020-01-10 DIAGNOSIS — Z3A.33 33 WEEKS GESTATION OF PREGNANCY: Primary | ICD-10-CM

## 2020-01-10 PROCEDURE — 99999 PR PBB SHADOW E&M-EST. PATIENT-LVL III: ICD-10-PCS | Mod: PBBFAC,,, | Performed by: OBSTETRICS & GYNECOLOGY

## 2020-01-10 PROCEDURE — 76816 PR  US,PREGNANT UTERUS,F/U,TRANSABD APP: ICD-10-PCS | Mod: 26,S$PBB,, | Performed by: OBSTETRICS & GYNECOLOGY

## 2020-01-10 PROCEDURE — 76819 PR US, OB, FETAL BIOPHYSICAL, W/O NST: ICD-10-PCS | Mod: 26,S$PBB,, | Performed by: OBSTETRICS & GYNECOLOGY

## 2020-01-10 PROCEDURE — 99213 OFFICE O/P EST LOW 20 MIN: CPT | Mod: PBBFAC,TH,25 | Performed by: OBSTETRICS & GYNECOLOGY

## 2020-01-10 PROCEDURE — 99213 OFFICE O/P EST LOW 20 MIN: CPT | Mod: TH,S$PBB,, | Performed by: OBSTETRICS & GYNECOLOGY

## 2020-01-10 PROCEDURE — 76819 FETAL BIOPHYS PROFIL W/O NST: CPT | Mod: 26,S$PBB,, | Performed by: OBSTETRICS & GYNECOLOGY

## 2020-01-10 PROCEDURE — 76816 OB US FOLLOW-UP PER FETUS: CPT | Mod: PBBFAC | Performed by: OBSTETRICS & GYNECOLOGY

## 2020-01-10 PROCEDURE — 76816 OB US FOLLOW-UP PER FETUS: CPT | Mod: 26,S$PBB,, | Performed by: OBSTETRICS & GYNECOLOGY

## 2020-01-10 PROCEDURE — 99213 PR OFFICE/OUTPT VISIT, EST, LEVL III, 20-29 MIN: ICD-10-PCS | Mod: TH,S$PBB,, | Performed by: OBSTETRICS & GYNECOLOGY

## 2020-01-10 PROCEDURE — 99999 PR PBB SHADOW E&M-EST. PATIENT-LVL III: CPT | Mod: PBBFAC,,, | Performed by: OBSTETRICS & GYNECOLOGY

## 2020-01-10 PROCEDURE — 76819 FETAL BIOPHYS PROFIL W/O NST: CPT | Mod: PBBFAC | Performed by: OBSTETRICS & GYNECOLOGY

## 2020-01-10 RX ORDER — INSULIN GLARGINE 100 [IU]/ML
20 INJECTION, SOLUTION SUBCUTANEOUS NIGHTLY
Qty: 10 ML | Refills: 5 | Status: SHIPPED | OUTPATIENT
Start: 2020-01-10 | End: 2020-01-31 | Stop reason: SDUPTHER

## 2020-01-10 NOTE — PROGRESS NOTES
Patient with no complaints. Denies vaginal bleeding or contractions. Good FM.   She has started her insulin at night and has been able to give herself this injection.  She has not been checking her glucose levels though. She also reports drinking pop, so not following diabetic diet. Discussed that her macrosomic infant is result of poorly controlled glucose and this can lead to other complications.  Glucose log given to patient.    RTC in 1 week to recheck glucose.

## 2020-01-17 ENCOUNTER — ROUTINE PRENATAL (OUTPATIENT)
Dept: OBSTETRICS AND GYNECOLOGY | Facility: CLINIC | Age: 35
End: 2020-01-17
Payer: MEDICAID

## 2020-01-17 VITALS
HEART RATE: 88 BPM | BODY MASS INDEX: 31.21 KG/M2 | DIASTOLIC BLOOD PRESSURE: 74 MMHG | WEIGHT: 181.81 LBS | SYSTOLIC BLOOD PRESSURE: 130 MMHG

## 2020-01-17 DIAGNOSIS — Z3A.34 34 WEEKS GESTATION OF PREGNANCY: Primary | ICD-10-CM

## 2020-01-17 DIAGNOSIS — O99.282 THYROID DISEASE DURING PREGNANCY, SECOND TRIMESTER: ICD-10-CM

## 2020-01-17 DIAGNOSIS — O24.913 DIABETES MELLITUS AFFECTING PREGNANCY IN THIRD TRIMESTER: ICD-10-CM

## 2020-01-17 DIAGNOSIS — O34.219 HISTORY OF CESAREAN DELIVERY AFFECTING PREGNANCY: ICD-10-CM

## 2020-01-17 DIAGNOSIS — E07.9 THYROID DISEASE DURING PREGNANCY, SECOND TRIMESTER: ICD-10-CM

## 2020-01-17 PROCEDURE — 99999 PR PBB SHADOW E&M-EST. PATIENT-LVL III: CPT | Mod: PBBFAC,,, | Performed by: OBSTETRICS & GYNECOLOGY

## 2020-01-17 PROCEDURE — 99213 OFFICE O/P EST LOW 20 MIN: CPT | Mod: PBBFAC,TH | Performed by: OBSTETRICS & GYNECOLOGY

## 2020-01-17 PROCEDURE — 99213 PR OFFICE/OUTPT VISIT, EST, LEVL III, 20-29 MIN: ICD-10-PCS | Mod: TH,S$PBB,, | Performed by: OBSTETRICS & GYNECOLOGY

## 2020-01-17 PROCEDURE — 99999 PR PBB SHADOW E&M-EST. PATIENT-LVL III: ICD-10-PCS | Mod: PBBFAC,,, | Performed by: OBSTETRICS & GYNECOLOGY

## 2020-01-17 PROCEDURE — 99213 OFFICE O/P EST LOW 20 MIN: CPT | Mod: TH,S$PBB,, | Performed by: OBSTETRICS & GYNECOLOGY

## 2020-01-17 NOTE — PROGRESS NOTES
Patient with no complaints except left leg pain and swelling in hands in the am. Denies vaginal bleeding or contractions. Good FM. RTC in 1 weeks. NST today.    Pt brought BG logs today.    Insulin: Lantus 20 units qhs --> 24 units qhs  Metformin 1000 BID  Fastin-141  2hr break: 133-179  2hr lunch: 111 - 150   2hr dinner: not checking (asleep)

## 2020-01-22 ENCOUNTER — ROUTINE PRENATAL (OUTPATIENT)
Dept: OBSTETRICS AND GYNECOLOGY | Facility: CLINIC | Age: 35
End: 2020-01-22
Payer: MEDICAID

## 2020-01-22 ENCOUNTER — PROCEDURE VISIT (OUTPATIENT)
Dept: OBSTETRICS AND GYNECOLOGY | Facility: CLINIC | Age: 35
End: 2020-01-22
Payer: MEDICAID

## 2020-01-22 VITALS
WEIGHT: 183.88 LBS | DIASTOLIC BLOOD PRESSURE: 76 MMHG | HEART RATE: 89 BPM | BODY MASS INDEX: 31.57 KG/M2 | SYSTOLIC BLOOD PRESSURE: 124 MMHG

## 2020-01-22 DIAGNOSIS — O24.913 DIABETES MELLITUS AFFECTING PREGNANCY IN THIRD TRIMESTER: ICD-10-CM

## 2020-01-22 DIAGNOSIS — Z3A.34 34 WEEKS GESTATION OF PREGNANCY: Primary | ICD-10-CM

## 2020-01-22 DIAGNOSIS — O99.282 THYROID DISEASE DURING PREGNANCY, SECOND TRIMESTER: ICD-10-CM

## 2020-01-22 DIAGNOSIS — E07.9 THYROID DISEASE DURING PREGNANCY IN THIRD TRIMESTER: ICD-10-CM

## 2020-01-22 DIAGNOSIS — E07.9 THYROID DISEASE DURING PREGNANCY, SECOND TRIMESTER: ICD-10-CM

## 2020-01-22 DIAGNOSIS — O99.283 THYROID DISEASE DURING PREGNANCY IN THIRD TRIMESTER: ICD-10-CM

## 2020-01-22 PROCEDURE — 76819 FETAL BIOPHYS PROFIL W/O NST: CPT | Mod: PBBFAC | Performed by: OBSTETRICS & GYNECOLOGY

## 2020-01-22 PROCEDURE — 99213 OFFICE O/P EST LOW 20 MIN: CPT | Mod: TH,S$PBB,, | Performed by: OBSTETRICS & GYNECOLOGY

## 2020-01-22 PROCEDURE — 99213 PR OFFICE/OUTPT VISIT, EST, LEVL III, 20-29 MIN: ICD-10-PCS | Mod: TH,S$PBB,, | Performed by: OBSTETRICS & GYNECOLOGY

## 2020-01-22 PROCEDURE — 99999 PR PBB SHADOW E&M-EST. PATIENT-LVL III: ICD-10-PCS | Mod: PBBFAC,,, | Performed by: OBSTETRICS & GYNECOLOGY

## 2020-01-22 PROCEDURE — 76819 PR US, OB, FETAL BIOPHYSICAL, W/O NST: ICD-10-PCS | Mod: 26,S$PBB,, | Performed by: OBSTETRICS & GYNECOLOGY

## 2020-01-22 PROCEDURE — 99213 OFFICE O/P EST LOW 20 MIN: CPT | Mod: PBBFAC,TH,25 | Performed by: OBSTETRICS & GYNECOLOGY

## 2020-01-22 PROCEDURE — 99999 PR PBB SHADOW E&M-EST. PATIENT-LVL III: CPT | Mod: PBBFAC,,, | Performed by: OBSTETRICS & GYNECOLOGY

## 2020-01-22 PROCEDURE — 76819 FETAL BIOPHYS PROFIL W/O NST: CPT | Mod: 26,S$PBB,, | Performed by: OBSTETRICS & GYNECOLOGY

## 2020-01-22 NOTE — PROGRESS NOTES
Patient with no complaints. Denies vaginal bleeding or contractions. Good FM.  Reviewed BG log.  All levels elevated.  Reviewed diet, not compliant. Discussed diet extensively.  Increase Lantus to 30 units qhs, pt given parameters to increase by 2 units.  Still having someone administer insulin, so unable to increase frequency of injections.   BPP 8/8.  RTC in 1 week.

## 2020-01-31 ENCOUNTER — ROUTINE PRENATAL (OUTPATIENT)
Dept: OBSTETRICS AND GYNECOLOGY | Facility: CLINIC | Age: 35
End: 2020-01-31
Payer: MEDICAID

## 2020-01-31 ENCOUNTER — PROCEDURE VISIT (OUTPATIENT)
Dept: OBSTETRICS AND GYNECOLOGY | Facility: CLINIC | Age: 35
End: 2020-01-31
Payer: MEDICAID

## 2020-01-31 VITALS
DIASTOLIC BLOOD PRESSURE: 82 MMHG | BODY MASS INDEX: 31.57 KG/M2 | WEIGHT: 183.88 LBS | SYSTOLIC BLOOD PRESSURE: 130 MMHG | HEART RATE: 88 BPM

## 2020-01-31 DIAGNOSIS — O99.282 THYROID DISEASE DURING PREGNANCY, SECOND TRIMESTER: ICD-10-CM

## 2020-01-31 DIAGNOSIS — Z3A.36 36 WEEKS GESTATION OF PREGNANCY: Primary | ICD-10-CM

## 2020-01-31 DIAGNOSIS — O24.913 DIABETES MELLITUS AFFECTING PREGNANCY IN THIRD TRIMESTER: ICD-10-CM

## 2020-01-31 DIAGNOSIS — E07.9 THYROID DISEASE DURING PREGNANCY, SECOND TRIMESTER: ICD-10-CM

## 2020-01-31 DIAGNOSIS — O34.219 HISTORY OF CESAREAN DELIVERY AFFECTING PREGNANCY: ICD-10-CM

## 2020-01-31 PROCEDURE — 99213 PR OFFICE/OUTPT VISIT, EST, LEVL III, 20-29 MIN: ICD-10-PCS | Mod: TH,S$PBB,, | Performed by: OBSTETRICS & GYNECOLOGY

## 2020-01-31 PROCEDURE — 99999 PR PBB SHADOW E&M-EST. PATIENT-LVL III: CPT | Mod: PBBFAC,,, | Performed by: OBSTETRICS & GYNECOLOGY

## 2020-01-31 PROCEDURE — 76819 FETAL BIOPHYS PROFIL W/O NST: CPT | Mod: PBBFAC | Performed by: OBSTETRICS & GYNECOLOGY

## 2020-01-31 PROCEDURE — 76819 FETAL BIOPHYS PROFIL W/O NST: CPT | Mod: 26,S$PBB,, | Performed by: OBSTETRICS & GYNECOLOGY

## 2020-01-31 PROCEDURE — 99213 OFFICE O/P EST LOW 20 MIN: CPT | Mod: PBBFAC,TH,25 | Performed by: OBSTETRICS & GYNECOLOGY

## 2020-01-31 PROCEDURE — 99213 OFFICE O/P EST LOW 20 MIN: CPT | Mod: TH,S$PBB,, | Performed by: OBSTETRICS & GYNECOLOGY

## 2020-01-31 PROCEDURE — 76816 PR  US,PREGNANT UTERUS,F/U,TRANSABD APP: ICD-10-PCS | Mod: 26,S$PBB,, | Performed by: OBSTETRICS & GYNECOLOGY

## 2020-01-31 PROCEDURE — 76816 OB US FOLLOW-UP PER FETUS: CPT | Mod: PBBFAC | Performed by: OBSTETRICS & GYNECOLOGY

## 2020-01-31 PROCEDURE — 76816 OB US FOLLOW-UP PER FETUS: CPT | Mod: 26,S$PBB,, | Performed by: OBSTETRICS & GYNECOLOGY

## 2020-01-31 PROCEDURE — 99999 PR PBB SHADOW E&M-EST. PATIENT-LVL III: ICD-10-PCS | Mod: PBBFAC,,, | Performed by: OBSTETRICS & GYNECOLOGY

## 2020-01-31 PROCEDURE — 76819 PR US, OB, FETAL BIOPHYSICAL, W/O NST: ICD-10-PCS | Mod: 26,S$PBB,, | Performed by: OBSTETRICS & GYNECOLOGY

## 2020-01-31 RX ORDER — INSULIN GLARGINE 100 [IU]/ML
34 INJECTION, SOLUTION SUBCUTANEOUS NIGHTLY
Qty: 10 ML | Refills: 5 | Status: SHIPPED | OUTPATIENT
Start: 2020-01-31 | End: 2023-08-28

## 2020-01-31 NOTE — PROGRESS NOTES
Patient with no complaints except for leg pains at night. Denies vaginal bleeding or contractions. Good FM. Labor precautions discused with patient.     Metformin and Insulin (Lantus 32 units qhs):      Fastin-139  2hr break: 126-141  2hr lunch: 132-151  2hr dinner: 108  She also admits that she has been doing better with diet.   Instructed to increase Lantus to 34 units qhs.    RTC in 1 week.

## 2020-02-03 ENCOUNTER — TELEPHONE (OUTPATIENT)
Dept: OBSTETRICS AND GYNECOLOGY | Facility: CLINIC | Age: 35
End: 2020-02-03

## 2020-02-03 NOTE — TELEPHONE ENCOUNTER
----- Message from Joleen Reyes MA sent at 2/3/2020  2:31 PM CST -----  Contact: self  Kiana Hardy  MRN: [id number]  Home Phone      [phone]  Work Phone      [phone]  Mobile          [phone]    Patient Care Team:  Grady Billings MD as PCP - General (Internal Medicine)  OB? Yes, 36w3d  What phone number can you be reached at?  [phone]  Message:   Needs to see about getting Rx for diabetes machine and supplies.  Stated has not seen PCP for her diabetes since she has been pregnant.  Pharmacy:  [facility]

## 2020-02-05 ENCOUNTER — ROUTINE PRENATAL (OUTPATIENT)
Dept: OBSTETRICS AND GYNECOLOGY | Facility: CLINIC | Age: 35
End: 2020-02-05
Payer: MEDICAID

## 2020-02-05 ENCOUNTER — HOSPITAL ENCOUNTER (OUTPATIENT)
Facility: HOSPITAL | Age: 35
Discharge: HOME | End: 2020-02-05
Attending: OBSTETRICS & GYNECOLOGY | Admitting: OBSTETRICS & GYNECOLOGY
Payer: MEDICAID

## 2020-02-05 ENCOUNTER — PROCEDURE VISIT (OUTPATIENT)
Dept: OBSTETRICS AND GYNECOLOGY | Facility: CLINIC | Age: 35
End: 2020-02-05
Payer: MEDICAID

## 2020-02-05 ENCOUNTER — TELEPHONE (OUTPATIENT)
Dept: OBSTETRICS AND GYNECOLOGY | Facility: CLINIC | Age: 35
End: 2020-02-05

## 2020-02-05 VITALS
SYSTOLIC BLOOD PRESSURE: 152 MMHG | WEIGHT: 183.63 LBS | BODY MASS INDEX: 31.51 KG/M2 | HEART RATE: 81 BPM | DIASTOLIC BLOOD PRESSURE: 80 MMHG

## 2020-02-05 VITALS
RESPIRATION RATE: 18 BRPM | SYSTOLIC BLOOD PRESSURE: 125 MMHG | TEMPERATURE: 98 F | HEIGHT: 64 IN | OXYGEN SATURATION: 96 % | DIASTOLIC BLOOD PRESSURE: 72 MMHG | BODY MASS INDEX: 31.35 KG/M2 | HEART RATE: 104 BPM | WEIGHT: 183.63 LBS

## 2020-02-05 DIAGNOSIS — R10.9 ABDOMINAL PAIN DURING PREGNANCY, THIRD TRIMESTER: ICD-10-CM

## 2020-02-05 DIAGNOSIS — O34.219 HISTORY OF CESAREAN DELIVERY AFFECTING PREGNANCY: Primary | ICD-10-CM

## 2020-02-05 DIAGNOSIS — O24.913 DIABETES MELLITUS AFFECTING PREGNANCY IN THIRD TRIMESTER: Primary | ICD-10-CM

## 2020-02-05 DIAGNOSIS — O34.219 HISTORY OF CESAREAN DELIVERY AFFECTING PREGNANCY: ICD-10-CM

## 2020-02-05 DIAGNOSIS — O24.913 DIABETES MELLITUS AFFECTING PREGNANCY IN THIRD TRIMESTER: ICD-10-CM

## 2020-02-05 DIAGNOSIS — O16.3 ELEVATED BLOOD PRESSURE AFFECTING PREGNANCY IN THIRD TRIMESTER, ANTEPARTUM: ICD-10-CM

## 2020-02-05 DIAGNOSIS — E11.65 UNCONTROLLED TYPE 2 DIABETES MELLITUS WITH HYPERGLYCEMIA: ICD-10-CM

## 2020-02-05 DIAGNOSIS — O26.893 ABDOMINAL PAIN DURING PREGNANCY, THIRD TRIMESTER: ICD-10-CM

## 2020-02-05 DIAGNOSIS — E07.9 THYROID DISEASE DURING PREGNANCY, SECOND TRIMESTER: ICD-10-CM

## 2020-02-05 DIAGNOSIS — O99.282 THYROID DISEASE DURING PREGNANCY, SECOND TRIMESTER: ICD-10-CM

## 2020-02-05 LAB
ALBUMIN SERPL BCP-MCNC: 2.6 G/DL (ref 3.5–5.2)
ALP SERPL-CCNC: 177 U/L (ref 55–135)
ALT SERPL W/O P-5'-P-CCNC: 7 U/L (ref 10–44)
ANION GAP SERPL CALC-SCNC: 11 MMOL/L (ref 8–16)
AST SERPL-CCNC: 12 U/L (ref 10–40)
BASOPHILS # BLD AUTO: 0.04 K/UL (ref 0–0.2)
BASOPHILS NFR BLD: 0.4 % (ref 0–1.9)
BILIRUB SERPL-MCNC: 0.2 MG/DL (ref 0.1–1)
BUN SERPL-MCNC: 14 MG/DL (ref 6–20)
CALCIUM SERPL-MCNC: 9.2 MG/DL (ref 8.7–10.5)
CHLORIDE SERPL-SCNC: 108 MMOL/L (ref 95–110)
CO2 SERPL-SCNC: 17 MMOL/L (ref 23–29)
CREAT SERPL-MCNC: 0.7 MG/DL (ref 0.5–1.4)
CREAT UR-MCNC: 229.3 MG/DL (ref 15–325)
DIFFERENTIAL METHOD: ABNORMAL
EOSINOPHIL # BLD AUTO: 0.2 K/UL (ref 0–0.5)
EOSINOPHIL NFR BLD: 1.7 % (ref 0–8)
ERYTHROCYTE [DISTWIDTH] IN BLOOD BY AUTOMATED COUNT: 14.8 % (ref 11.5–14.5)
EST. GFR  (AFRICAN AMERICAN): >60 ML/MIN/1.73 M^2
EST. GFR  (NON AFRICAN AMERICAN): >60 ML/MIN/1.73 M^2
GLUCOSE SERPL-MCNC: 90 MG/DL (ref 70–110)
HCT VFR BLD AUTO: 38.6 % (ref 37–48.5)
HGB BLD-MCNC: 12.7 G/DL (ref 12–16)
IMM GRANULOCYTES # BLD AUTO: 0.03 K/UL (ref 0–0.04)
IMM GRANULOCYTES NFR BLD AUTO: 0.3 % (ref 0–0.5)
LYMPHOCYTES # BLD AUTO: 3.3 K/UL (ref 1–4.8)
LYMPHOCYTES NFR BLD: 31 % (ref 18–48)
MCH RBC QN AUTO: 28.5 PG (ref 27–31)
MCHC RBC AUTO-ENTMCNC: 32.9 G/DL (ref 32–36)
MCV RBC AUTO: 87 FL (ref 82–98)
MONOCYTES # BLD AUTO: 0.8 K/UL (ref 0.3–1)
MONOCYTES NFR BLD: 7.5 % (ref 4–15)
NEUTROPHILS # BLD AUTO: 6.3 K/UL (ref 1.8–7.7)
NEUTROPHILS NFR BLD: 59.1 % (ref 38–73)
NRBC BLD-RTO: 0 /100 WBC
PLATELET # BLD AUTO: 140 K/UL (ref 150–350)
PMV BLD AUTO: 14 FL (ref 9.2–12.9)
POTASSIUM SERPL-SCNC: 4.2 MMOL/L (ref 3.5–5.1)
PROT SERPL-MCNC: 6.2 G/DL (ref 6–8.4)
PROT UR-MCNC: 115 MG/DL (ref 0–15)
PROT/CREAT UR: 0.5 MG/G{CREAT} (ref 0–0.2)
RBC # BLD AUTO: 4.46 M/UL (ref 4–5.4)
SODIUM SERPL-SCNC: 136 MMOL/L (ref 136–145)
WBC # BLD AUTO: 10.6 K/UL (ref 3.9–12.7)

## 2020-02-05 PROCEDURE — 36415 COLL VENOUS BLD VENIPUNCTURE: CPT

## 2020-02-05 PROCEDURE — 99499 UNLISTED E&M SERVICE: CPT | Mod: TH,S$PBB,, | Performed by: OBSTETRICS & GYNECOLOGY

## 2020-02-05 PROCEDURE — 99213 OFFICE O/P EST LOW 20 MIN: CPT | Mod: PBBFAC,TH,25 | Performed by: OBSTETRICS & GYNECOLOGY

## 2020-02-05 PROCEDURE — G0378 HOSPITAL OBSERVATION PER HR: HCPCS

## 2020-02-05 PROCEDURE — 99499 UNLISTED E&M SERVICE: CPT | Mod: S$PBB,,, | Performed by: PEDIATRICS

## 2020-02-05 PROCEDURE — 76819 FETAL BIOPHYS PROFIL W/O NST: CPT | Mod: PBBFAC | Performed by: PEDIATRICS

## 2020-02-05 PROCEDURE — 59025 FETAL NON-STRESS TEST: CPT | Mod: 26,,, | Performed by: OBSTETRICS & GYNECOLOGY

## 2020-02-05 PROCEDURE — 99211 OFF/OP EST MAY X REQ PHY/QHP: CPT | Mod: 25,27,TH

## 2020-02-05 PROCEDURE — 76819 FETAL BIOPHYS PROFIL W/O NST: CPT | Mod: 26,S$PBB,, | Performed by: PEDIATRICS

## 2020-02-05 PROCEDURE — 99499 NO LOS: ICD-10-PCS | Mod: S$PBB,,, | Performed by: PEDIATRICS

## 2020-02-05 PROCEDURE — 59025 PR FETAL 2N-STRESS TEST: ICD-10-PCS | Mod: 26,,, | Performed by: OBSTETRICS & GYNECOLOGY

## 2020-02-05 PROCEDURE — 76819 PR US, OB, FETAL BIOPHYSICAL, W/O NST: ICD-10-PCS | Mod: 26,S$PBB,, | Performed by: PEDIATRICS

## 2020-02-05 PROCEDURE — 99499 NO LOS: ICD-10-PCS | Mod: TH,S$PBB,, | Performed by: OBSTETRICS & GYNECOLOGY

## 2020-02-05 PROCEDURE — 85025 COMPLETE CBC W/AUTO DIFF WBC: CPT

## 2020-02-05 PROCEDURE — 99219 PR INITIAL OBSERVATION CARE,LEVL II: CPT | Mod: 25,,, | Performed by: OBSTETRICS & GYNECOLOGY

## 2020-02-05 PROCEDURE — 84156 ASSAY OF PROTEIN URINE: CPT

## 2020-02-05 PROCEDURE — 80053 COMPREHEN METABOLIC PANEL: CPT

## 2020-02-05 PROCEDURE — 99999 PR PBB SHADOW E&M-EST. PATIENT-LVL III: ICD-10-PCS | Mod: PBBFAC,,, | Performed by: OBSTETRICS & GYNECOLOGY

## 2020-02-05 PROCEDURE — 99999 PR PBB SHADOW E&M-EST. PATIENT-LVL III: CPT | Mod: PBBFAC,,, | Performed by: OBSTETRICS & GYNECOLOGY

## 2020-02-05 PROCEDURE — 99219 PR INITIAL OBSERVATION CARE,LEVL II: ICD-10-PCS | Mod: 25,,, | Performed by: OBSTETRICS & GYNECOLOGY

## 2020-02-05 PROCEDURE — 59025 FETAL NON-STRESS TEST: CPT

## 2020-02-05 RX ORDER — LEVOTHYROXINE SODIUM 25 UG/1
25 TABLET ORAL DAILY
Status: DISCONTINUED | OUTPATIENT
Start: 2020-02-06 | End: 2020-02-05 | Stop reason: HOSPADM

## 2020-02-05 RX ORDER — METFORMIN HYDROCHLORIDE 500 MG/1
1000 TABLET ORAL 2 TIMES DAILY WITH MEALS
Status: DISCONTINUED | OUTPATIENT
Start: 2020-02-05 | End: 2020-02-05 | Stop reason: HOSPADM

## 2020-02-05 RX ORDER — ACETAMINOPHEN 500 MG
500 TABLET ORAL EVERY 6 HOURS PRN
Status: DISCONTINUED | OUTPATIENT
Start: 2020-02-05 | End: 2020-02-05 | Stop reason: HOSPADM

## 2020-02-05 RX ORDER — ONDANSETRON 4 MG/1
8 TABLET, ORALLY DISINTEGRATING ORAL EVERY 8 HOURS PRN
Status: DISCONTINUED | OUTPATIENT
Start: 2020-02-05 | End: 2020-02-05 | Stop reason: HOSPADM

## 2020-02-05 NOTE — SUBJECTIVE & OBJECTIVE
"Obstetric HPI:  Patient reports no contractions, active fetal movement, No vaginal bleeding , No loss of fluid     This pregnancy has been complicated by DM2 (poorly controlled), thyroid d/o, history of , h/o exlap this pregnancy for RSO.    OB History    Para Term  AB Living   3 1 1 0 1 1   SAB TAB Ectopic Multiple Live Births   1 0 0 0 1      # Outcome Date GA Lbr Umair/2nd Weight Sex Delivery Anes PTL Lv   3 Current            2 Term 10/09/02 38w0d  3.374 kg (7 lb 7 oz) F CS-LTranv  N FERNANDO      Complications: Breech birth      Name: Wilmer Hooper SAB  8w0d             Birth Comments: D&C     Past Medical History:   Diagnosis Date    Diabetes mellitus     Pregnancy 2019    Thyroid disease     hypothyroidism    Thyroid disease during pregnancy, second trimester 10/18/2019     Past Surgical History:   Procedure Laterality Date     SECTION      CHOLECYSTECTOMY      DILATION AND CURETTAGE OF UTERUS      FOOT SURGERY Right     has a nail in the foot    SALPINGOOPHORECTOMY Right 2019    Procedure: RIGHT SALPINGO-OOPHORECTOMY USO;  Surgeon: Mary Iglesias MD;  [facility];  Service: OB/GYN;  Laterality: Right;    TONSILLECTOMY         PTA Medications   Medication Sig    aspirin (ECOTRIN) 81 MG EC tablet Take 81 mg by mouth once daily.    CLASSIC PRENATAL 28 mg iron- 800 mcg Tab Take 1 tablet by mouth once daily.    insulin glargine (LANTUS U-100 INSULIN) 100 unit/mL injection Inject 34 Units into the skin every evening.    levothyroxine (SYNTHROID) 25 MCG tablet Take 25 mcg by mouth once daily.    metFORMIN (GLUCOPHAGE) 1000 MG tablet Take 1 tablet (1,000 mg total) by mouth 2 (two) times daily with meals.    BD INSULIN SYRINGE ULTRA-FINE 0.5 mL 31 gauge x 5/16" Syrg Inject 1 Syringe into the skin once daily.    blood sugar diagnostic (RELION PRIME TEST STRIPS) Strp 1 strip by Misc.(Non-Drug; Combo Route) route 4 (four) times daily.    citalopram " "(CELEXA) 10 MG tablet citalopram 10 mg tablet   Take 1 tablet(s) every day by oral route.    citalopram (CELEXA) 20 MG tablet citalopram 20 mg tablet   Take 1 tablet(s) every day by oral route.    insulin syringe-needle U-100 (BD INSULIN SYRINGE ULTRA-FINE) 0.3 mL 31 gauge x 5/16" Syrg Use in the evening as directed.    OPW TEST CLAIM - DO NOT FILL Inject into the skin. OPW test claim. Do not fill.    OPW TEST CLAIM - DO NOT FILL Inject into the skin. OPW test claim. Do not fill.    pen needle, diabetic 29 gauge x 1/2" Ndle Use four times daily with insulin pens    TRUE METRIX GLUCOSE TEST STRIP Strp     TRUEPLUS LANCETS 28 gauge Misc        Review of patient's allergies indicates:  No Known Allergies     Family History     Problem Relation (Age of Onset)    Breast cancer Maternal Grandmother    COPD Mother        Tobacco Use    Smoking status: Current Every Day Smoker     Packs/day: 0.50     Years: 15.00     Pack years: 7.50     Types: Cigarettes    Smokeless tobacco: Never Used   Substance and Sexual Activity    Alcohol use: Not Currently    Drug use: Yes     Types: Methamphetamines, Marijuana     Comment: last use over a year    Sexual activity: Yes     Partners: Male     Birth control/protection: None     Comment:      Review of Systems   Constitutional: Negative for activity change, appetite change, chills, diaphoresis, fatigue and fever.   Eyes: Negative for visual disturbance.   Respiratory: Negative for cough, shortness of breath and wheezing.    Cardiovascular: Negative for chest pain and palpitations.   Gastrointestinal: Negative for abdominal pain, constipation, diarrhea, nausea and vomiting.   Genitourinary: Negative for dysuria, genital sores, pelvic pain, urgency, vaginal bleeding, vaginal discharge, vaginal pain and vaginal odor.   Musculoskeletal: Negative for back pain, joint swelling and myalgias.   Integumentary:  Negative for rash.   Neurological: Negative for seizures, " syncope, numbness and headaches.   Hematological: Negative for adenopathy. Does not bruise/bleed easily.   Psychiatric/Behavioral: Negative for depression. The patient is not nervous/anxious.       Objective:     Vital Signs (Most Recent):  Temp: 97.9 °F (36.6 °C) (02/05/20 1515)  Pulse: 101 (02/05/20 1615)  Resp: 18 (02/05/20 1515)  BP: 131/76 (02/05/20 1615)  SpO2: 96 % (02/05/20 1615) Vital Signs (24h Range):  Temp:  [97.9 °F (36.6 °C)] 97.9 °F (36.6 °C)  Pulse:  [] 101  Resp:  [18] 18  SpO2:  [95 %-97 %] 96 %  BP: (121-152)/(73-87) 131/76     Weight: 83.3 kg (183 lb 9.6 oz)  Body mass index is 31.51 kg/m².    FHT: 150 Cat 1 (reassuring)  TOCO:  no contractions, mild irritability improved with rest    Physical Exam:   Constitutional: She is oriented to person, place, and time. She appears well-developed and well-nourished. No distress.    HENT:   Head: Normocephalic and atraumatic.    Eyes: Conjunctivae and EOM are normal.    Neck: Normal range of motion. Neck supple.     Pulmonary/Chest: Effort normal.                  Musculoskeletal: Normal range of motion.       Neurological: She is alert and oriented to person, place, and time.    Skin: Skin is warm and dry.    Psychiatric: She has a normal mood and affect. Her behavior is normal. Judgment and thought content normal.         Significant Labs:  Lab Results   Component Value Date    [facility] B POS 09/24/2019       I have personallly reviewed all pertinent lab results from the last 24 hours.  Recent Lab Results       02/05/20  1535   02/05/20  1528        Albumin 2.6       Alkaline Phosphatase 177       ALT 7       Anion Gap 11       AST 12       Baso # 0.04       Basophil% 0.4       BILIRUBIN TOTAL 0.2  Comment:  For infants and newborns, interpretation of results should be based  on gestational age, weight and in agreement with clinical  observations.  Premature Infant recommended reference ranges:  Up to 24 hours.............<8.0 mg/dL  Up to 48  hours............<12.0 mg/dL  3-5 days..................<15.0 mg/dL  6-29 days.................<15.0 mg/dL         BUN, Bld 14       Calcium 9.2       Chloride 108       CO2 17       Creatinine 0.7       Creatinine, Random Ur   229.3  Comment:  The random urine reference ranges provided were established   for 24 hour urine collections.  No reference ranges exist for  random urine specimens.  Correlate clinically.       Differential Method Automated       eGFR if  >60       eGFR if non  >60  Comment:  Calculation used to obtain the estimated glomerular filtration  rate (eGFR) is the CKD-EPI equation.          Eos # 0.2       Eosinophil% 1.7       Glucose 90       Gran # (ANC) 6.3       Gran% 59.1       Hematocrit 38.6       Hemoglobin 12.7       Immature Grans (Abs) 0.03  Comment:  Mild elevation in immature granulocytes is non specific and   can be seen in a variety of conditions including stress response,   acute inflammation, trauma and pregnancy. Correlation with other   laboratory and clinical findings is essential.         Immature Granulocytes 0.3       Lymph # 3.3       Lymph% 31.0       MCH 28.5       MCHC 32.9       MCV 87       Mono # 0.8       Mono% 7.5       MPV 14.0       nRBC 0       Platelets 140       Potassium 4.2       Prot/Creat Ratio, Ur   0.50     PROTEIN TOTAL 6.2       Protein, Urine Random   115  Comment:  The random urine reference ranges provided were established   for 24 hour urine collections.  No reference ranges exist for  random urine specimens.  Correlate clinically.       RBC 4.46       RDW 14.8       Sodium 136       WBC 10.60

## 2020-02-05 NOTE — HPI
Pt is a  @ 36 5/7 WGA with h/o DM2 on metformin and insulin and thyroid d/o who presented for routine OB visit today and was noted to have BP above patient's normal baseline.  She has no complaints except abdominal pain earlier in clinic, now resolved.  No vaginal bleeding/LOF. Normal FM.

## 2020-02-05 NOTE — H&P
[facility]  Obstetrics  History & Physical    Patient Name: Kiana Hardy  MRN: [id number]  Admission Date: 2020  Primary Care Provider: Grady Billings MD    Subjective:     Principal Problem:Elevated blood pressure affecting pregnancy in third trimester, antepartum    History of Present Illness:  Pt is a  @ 36 5/7 WGA with h/o DM2 on metformin and insulin and thyroid d/o who presented for routine OB visit today and was noted to have BP above patient's normal baseline.  She has no complaints except abdominal pain earlier in clinic, now resolved.  No vaginal bleeding/LOF. Normal FM.     Obstetric HPI:  Patient reports no contractions, active fetal movement, No vaginal bleeding , No loss of fluid     This pregnancy has been complicated by DM2 (poorly controlled), thyroid d/o, history of , h/o exlap this pregnancy for RSO.    OB History    Para Term  AB Living   3 1 1 0 1 1   SAB TAB Ectopic Multiple Live Births   1 0 0 0 1      # Outcome Date GA Lbr Umair/2nd Weight Sex Delivery Anes PTL Lv   3 Current            2 Term 10/09/02 38w0d  3.374 kg (7 lb 7 oz) F CS-LTranv  N FERNANDO      Complications: Breech birth      Name: Wilmer   1 SAB  8w0d             Birth Comments: D&C     Past Medical History:   Diagnosis Date    Diabetes mellitus     Pregnancy 2019    Thyroid disease     hypothyroidism    Thyroid disease during pregnancy, second trimester 10/18/2019     Past Surgical History:   Procedure Laterality Date     SECTION      CHOLECYSTECTOMY      DILATION AND CURETTAGE OF UTERUS      FOOT SURGERY Right     has a nail in the foot    SALPINGOOPHORECTOMY Right 2019    Procedure: RIGHT SALPINGO-OOPHORECTOMY USO;  Surgeon: Mary Iglesias MD;  Location: [facility] OR;  Service: OB/GYN;  Laterality: Right;    TONSILLECTOMY         PTA Medications   Medication Sig    aspirin (ECOTRIN) 81 MG EC tablet Take 81 mg by mouth once daily.  "   CLASSIC PRENATAL 28 mg iron- 800 mcg Tab Take 1 tablet by mouth once daily.    insulin glargine (LANTUS U-100 INSULIN) 100 unit/mL injection Inject 34 Units into the skin every evening.    levothyroxine (SYNTHROID) 25 MCG tablet Take 25 mcg by mouth once daily.    metFORMIN (GLUCOPHAGE) 1000 MG tablet Take 1 tablet (1,000 mg total) by mouth 2 (two) times daily with meals.    BD INSULIN SYRINGE ULTRA-FINE 0.5 mL 31 gauge x 5/16" Syrg Inject 1 Syringe into the skin once daily.    blood sugar diagnostic (RELION PRIME TEST STRIPS) Strp 1 strip by Misc.(Non-Drug; Combo Route) route 4 (four) times daily.    citalopram (CELEXA) 10 MG tablet citalopram 10 mg tablet   Take 1 tablet(s) every day by oral route.    citalopram (CELEXA) 20 MG tablet citalopram 20 mg tablet   Take 1 tablet(s) every day by oral route.    insulin syringe-needle U-100 (BD INSULIN SYRINGE ULTRA-FINE) 0.3 mL 31 gauge x 5/16" Syrg Use in the evening as directed.    OPW TEST CLAIM - DO NOT FILL Inject into the skin. OPW test claim. Do not fill.    OPW TEST CLAIM - DO NOT FILL Inject into the skin. OPW test claim. Do not fill.    pen needle, diabetic 29 gauge x 1/2" Ndle Use four times daily with insulin pens    TRUE METRIX GLUCOSE TEST STRIP Strp     TRUEPLUS LANCETS 28 gauge [facility]        Review of patient's allergies indicates:  No Known Allergies     Family History     Problem Relation (Age of Onset)    Breast cancer Maternal Grandmother    COPD Mother        Tobacco Use    Smoking status: Current Every Day Smoker     Packs/day: 0.50     Years: 15.00     Pack years: 7.50     Types: Cigarettes    Smokeless tobacco: Never Used   Substance and Sexual Activity    Alcohol use: Not Currently    Drug use: Yes     Types: Methamphetamines, Marijuana     Comment: last use over a year    Sexual activity: Yes     Partners: Male     Birth control/protection: None     Comment:      Review of Systems   Constitutional: Negative for " activity change, appetite change, chills, diaphoresis, fatigue and fever.   Eyes: Negative for visual disturbance.   Respiratory: Negative for cough, shortness of breath and wheezing.    Cardiovascular: Negative for chest pain and palpitations.   Gastrointestinal: Negative for abdominal pain, constipation, diarrhea, nausea and vomiting.   Genitourinary: Negative for dysuria, genital sores, pelvic pain, urgency, vaginal bleeding, vaginal discharge, vaginal pain and vaginal odor.   Musculoskeletal: Negative for back pain, joint swelling and myalgias.   Integumentary:  Negative for rash.   Neurological: Negative for seizures, syncope, numbness and headaches.   Hematological: Negative for adenopathy. Does not bruise/bleed easily.   Psychiatric/Behavioral: Negative for depression. The patient is not nervous/anxious.       Objective:     Vital Signs (Most Recent):  Temp: 97.9 °F (36.6 °C) (02/05/20 1515)  Pulse: 101 (02/05/20 1615)  Resp: 18 (02/05/20 1515)  BP: 131/76 (02/05/20 1615)  SpO2: 96 % (02/05/20 1615) Vital Signs (24h Range):  Temp:  [97.9 °F (36.6 °C)] 97.9 °F (36.6 °C)  Pulse:  [] 101  Resp:  [18] 18  SpO2:  [95 %-97 %] 96 %  BP: (121-152)/(73-87) 131/76     Weight: 83.3 kg (183 lb 9.6 oz)  Body mass index is 31.51 kg/m².    FHT: 150 Cat 1 (reassuring)  TOCO:  no contractions, mild irritability improved with rest    Physical Exam:   Constitutional: She is oriented to person, place, and time. She appears well-developed and well-nourished. No distress.    HENT:   Head: Normocephalic and atraumatic.    Eyes: Conjunctivae and EOM are normal.    Neck: Normal range of motion. Neck supple.     Pulmonary/Chest: Effort normal.                  Musculoskeletal: Normal range of motion.       Neurological: She is alert and oriented to person, place, and time.    Skin: Skin is warm and dry.    Psychiatric: She has a normal mood and affect. Her behavior is normal. Judgment and thought content normal.          Significant Labs:  Lab Results   Component Value Date    [facility] B POS 09/24/2019       I have personallly reviewed all pertinent lab results from the last 24 hours.  Recent Lab Results       02/05/20  1535   02/05/20  1528        Albumin 2.6       Alkaline Phosphatase 177       ALT 7       Anion Gap 11       AST 12       Baso # 0.04       Basophil% 0.4       BILIRUBIN TOTAL 0.2  Comment:  For infants and newborns, interpretation of results should be based  on gestational age, weight and in agreement with clinical  observations.  Premature Infant recommended reference ranges:  Up to 24 hours.............<8.0 mg/dL  Up to 48 hours............<12.0 mg/dL  3-5 days..................<15.0 mg/dL  6-29 days.................<15.0 mg/dL         BUN, Bld 14       Calcium 9.2       Chloride 108       CO2 17       Creatinine 0.7       Creatinine, Random Ur   229.3  Comment:  The random urine reference ranges provided were established   for 24 hour urine collections.  No reference ranges exist for  random urine specimens.  Correlate clinically.       Differential Method Automated       eGFR if  >60       eGFR if non  >60  Comment:  Calculation used to obtain the estimated glomerular filtration  rate (eGFR) is the CKD-EPI equation.          Eos # 0.2       Eosinophil% 1.7       Glucose 90       Gran # (ANC) 6.3       Gran% 59.1       Hematocrit 38.6       Hemoglobin 12.7       Immature Grans (Abs) 0.03  Comment:  Mild elevation in immature granulocytes is non specific and   can be seen in a variety of conditions including stress response,   acute inflammation, trauma and pregnancy. Correlation with other   laboratory and clinical findings is essential.         Immature Granulocytes 0.3       Lymph # 3.3       Lymph% 31.0       MCH 28.5       MCHC 32.9       MCV 87       Mono # 0.8       Mono% 7.5       MPV 14.0       nRBC 0       Platelets 140       Potassium 4.2       Prot/Creat Ratio,  Ur   0.50     PROTEIN TOTAL 6.2       Protein, Urine Random   115  Comment:  The random urine reference ranges provided were established   for 24 hour urine collections.  No reference ranges exist for  random urine specimens.  Correlate clinically.       RBC 4.46       RDW 14.8       Sodium 136       WBC 10.60           Assessment/Plan:     34 y.o. female  at 36w5d for:    * Elevated blood pressure affecting pregnancy in third trimester, antepartum  Pt with normotensive BP over prolonged L&D monitoring.   Pre-e labs reviewed.  Will plan to repeat labs in 1 week or earlier if symptomatic.   Pre-e precautions and instructions reviewed with patient.     Abdominal pain during pregnancy, third trimester  [place] reassuring, resolved with rest on L&D    Diabetes mellitus affecting pregnancy in third trimester  Continue Metformin and insulin    History of  delivery affecting pregnancy  Noted.   Plan for repeat  with BTL with delivery.        Onelia Crespo MD  Obstetrics  [facility]

## 2020-02-05 NOTE — HOSPITAL COURSE
Pt sent to L&D for observation with serial BP measurements (normal) and pre-e labs.  Will discharge home with strict pre-e precautions.

## 2020-02-05 NOTE — DISCHARGE INSTRUCTIONS
Return to the labor unit or call ob nurse at hospital if:    1.  Any questions whether the water bag broke or is leaking (sudden gush or suspected leak).   2.  If 35 wks or greater, no need to call about passing the mucous plug if no other signs of labor.  3.  You may have spotting for a day or two from the vaginal exam  4.  Please report heavier vaginal bleeding (requires you to put a pad on or blood is running down your leg)  5. Report if you are having more than 6 contractions in an hour and less than 37 weeks, but at 37-38 wks. and beyond you can time your contractions and notify doctor if they are longer, stronger and closer together  6.  You should call if you are experiencing sharp abdominal pains or severe cramping.    7.  If you are > or = 28 wks, Do fetal kick counts 2 times a day  &  report decreased fetal movement:  You should feel 10 movements in 2 hours or less.  Notify MD or OB nurse as soon as possible if you are not getting the required movements or if  it is taking you longer and longer to get the 10 movements and DO NOT WAIT UNTIL TOMORROW EVEN IF YOU HAVE AN APPT.  8.  Drink plenty of water and empty your bladder often, avoid caffeine & carbonated beverages as much as possible & avoid smoking  9.  Keep your appt. as scheduled    Office phone # [phone]  If after office hours, on the weekend, or unable to reach the nurse at the office, call the Hospital phone # [phone] & ask to speak to the OB nurse

## 2020-02-05 NOTE — PROGRESS NOTES
Pt complains of swelling.  No HA.  No current vision changes but did have some pain in her eyes yesterday.  Complains of bad abd cramps. To L&D now for pre-e work up.   Insulin:  Lantus 36 units qhs    Fastin-142  2hr break: 98 -141  2hr lunch:   2hr dinner: 126-130

## 2020-02-05 NOTE — DISCHARGE SUMMARY
[facility]  Obstetrics  Discharge Summary      Patient Name: Kiana Hardy  MRN: [id number]  Admission Date: 2020  Hospital Length of Stay: 0 days  Discharge Date and Time:  2020 5:26 PM  Attending Physician: Onelia Crespo MD   Discharging Provider: Onelia Crespo MD   Primary Care Provider: Grady Billings MD    HPI: Pt is a  @ 36 5/7 WGA with h/o DM2 on metformin and insulin and thyroid d/o who presented for routine OB visit today and was noted to have BP above patient's normal baseline.  She has no complaints except abdominal pain earlier in clinic, now resolved.  No vaginal bleeding/LOF. Normal FM.       * No surgery found *     Hospital Course:   Pt sent to L&D for observation with serial BP measurements (normal) and pre-e labs.  Will discharge home with strict pre-e precautions.         Final Active Diagnoses:    Diagnosis Date Noted POA    PRINCIPAL PROBLEM:  Elevated blood pressure affecting pregnancy in third trimester, antepartum [O16.3] 2020 Yes    Abdominal pain during pregnancy, third trimester [O26.893, R10.9] 2020 Yes    Diabetes mellitus affecting pregnancy in third trimester [O24.913] 10/18/2019 Yes    History of  delivery affecting pregnancy [O34.219] 10/18/2019 Yes      Problems Resolved During this Admission:        Labs: All labs within the past 24 hours have been reviewed        Immunizations     None          This patient has no babies on file.  Pending Diagnostic Studies:     None          Discharged Condition: good    Disposition: Home or Self Care    Follow Up:  Follow-up Information     Onelia Crespo MD In 1 week.    Specialty:  Obstetrics and Gynecology  Why:  Routine OB visit  Contact information:  [place] [id number]  [phone]                 Patient Instructions:      Diet Adult Regular     Lifting restrictions   Order Comments: No lifting greater than 15 pounds     Call MD for:   "temperature >100.4     Call MD for:  persistent nausea and vomiting or diarrhea     Call MD for:  severe uncontrolled pain     Call MD for:  redness, tenderness, or signs of infection (pain, swelling, redness, odor or green/yellow discharge around incision site)     Call MD for:  difficulty breathing or increased cough     Call MD for:  severe persistent headache     Call MD for:  worsening rash     Call MD for:  persistent dizziness, light-headedness, or visual disturbances     Call MD for:  increased confusion or weakness     Call MD for:   Order Comments: Obstetric patients: Call for decreased fetal movement, regular uterine contractions, leakage of fluid, vaginal bleeding or any other concerns  Gynecology patients: Call for incisional drainage, redness, or tenderness, abnormal vaginal bleeding or discharge or any other concerns      Leave dressing on - Keep it clean, dry, and intact until clinic visit     Activity as tolerated     Medications:  Current Discharge Medication List      CONTINUE these medications which have NOT CHANGED    Details   aspirin (ECOTRIN) 81 MG EC tablet Take 81 mg by mouth once daily.      CLASSIC PRENATAL 28 mg iron- 800 mcg Tab Take 1 tablet by mouth once daily.  Refills: 0      insulin glargine (LANTUS U-100 INSULIN) 100 unit/mL injection Inject 34 Units into the skin every evening.  Qty: 10 mL, Refills: 5      levothyroxine (SYNTHROID) 25 MCG tablet Take 25 mcg by mouth once daily.      metFORMIN (GLUCOPHAGE) 1000 MG tablet Take 1 tablet (1,000 mg total) by mouth 2 (two) times daily with meals.  Qty: 60 tablet, Refills: 11    Associated Diagnoses: Diabetes mellitus affecting pregnancy, second trimester      BD INSULIN SYRINGE ULTRA-FINE 0.5 mL 31 gauge x 5/16" Syrg Inject 1 Syringe into the skin once daily.      !! blood sugar diagnostic (RELION PRIME TEST STRIPS) Strp 1 strip by Misc.(Non-Drug; Combo Route) route 4 (four) times daily.  Qty: 200 strip, Refills: 0    Associated " "Diagnoses: Diabetes mellitus affecting pregnancy in third trimester      !! citalopram (CELEXA) 10 MG tablet citalopram 10 mg tablet   Take 1 tablet(s) every day by oral route.      !! citalopram (CELEXA) 20 MG tablet citalopram 20 mg tablet   Take 1 tablet(s) every day by oral route.      insulin syringe-needle U-100 (BD INSULIN SYRINGE ULTRA-FINE) 0.3 mL 31 gauge x 5/16" Syrg Use in the evening as directed.  Qty: 100 each, Refills: 11      !! OPW TEST CLAIM - DO NOT FILL Inject into the skin. OPW test claim. Do not fill.  Qty: 30 mL, Refills: 0      !! OPW TEST CLAIM - DO NOT FILL Inject into the skin. OPW test claim. Do not fill.  Qty: 15 mL, Refills: 0      pen needle, diabetic 29 gauge x 1/2" Ndle Use four times daily with insulin pens  Qty: 100 each, Refills: 11      !! TRUE METRIX GLUCOSE TEST STRIP Strp       TRUEPLUS LANCETS 28 gauge Misc        !! - Potential duplicate medications found. Please discuss with provider.      STOP taking these medications       HUMALOG U-100 INSULIN 100 unit/mL injection Comments:   Reason for Stopping:               Onelia Crespo MD  Obstetrics  [facility]  "

## 2020-02-05 NOTE — NURSING
Patient educated on preeclampsia and provided with preeclampsia handout. Discussed signs and symptoms, risks to mother and baby, and what to do if symptoms of preeclampsia are present. Patient verbalized understanding.

## 2020-02-05 NOTE — PROCEDURES
FETAL ASSESSMENT REPORT    RE: Kiana Hardy  MRN:  [id number]  :  1985  AGE:  34 y.o.    Date:  2020    REFERRAL PHYSICIAN: Dr. Onelia Crespo    Allergies: Patient has no known allergies.    Kiana is a 34 y.o.  at 36w5d gestational age here today for a NST.    2020, Date entered prior to episode creation    MEDICATIONS AT TIME OF TEST:  Current Facility-Administered Medications   Medication Dose Route Frequency Provider Last Rate Last Dose    acetaminophen tablet 500 mg  500 mg Oral Q6H PRN Onelia Crespo MD        insulin detemir U-100 pen 36 Units  36 Units Subcutaneous QHS Onelia Crespo MD        [START ON 2020] levothyroxine tablet 25 mcg  25 mcg Oral Daily Onelia Crespo MD        metFORMIN tablet 1,000 mg  1,000 mg Oral BID WM Onelia Crespo MD        ondansetron disintegrating tablet 8 mg  8 mg Oral Q8H PRN Onelia Crespo MD        promethazine (PHENERGAN) 12.5 mg in dextrose 5 % 50 mL IVPB  12.5 mg Intravenous Q6H PRN Onelia Crespo MD           Indication: diabetes mellitus and elevated BP     Interpretation:  150 BPM baseline    Variability:  Good {> 6 bpm)    Accelerations:  Reactive    Decelerations:  none    Assessment: reactive    Plan:  NST reactive      Onelia Crespo MD  2020; 5:25 PM

## 2020-02-05 NOTE — ASSESSMENT & PLAN NOTE
Pt with normotensive BP over prolonged L&D monitoring.   Pre-e labs reviewed.  Will plan to repeat labs in 1 week or earlier if symptomatic.   Pre-e precautions and instructions reviewed with patient.

## 2020-02-12 ENCOUNTER — HOSPITAL ENCOUNTER (OUTPATIENT)
Dept: PREADMISSION TESTING | Facility: HOSPITAL | Age: 35
Discharge: HOME | End: 2020-02-12
Attending: OBSTETRICS & GYNECOLOGY
Payer: MEDICAID

## 2020-02-12 ENCOUNTER — ROUTINE PRENATAL (OUTPATIENT)
Dept: OBSTETRICS AND GYNECOLOGY | Facility: CLINIC | Age: 35
End: 2020-02-12
Payer: MEDICAID

## 2020-02-12 ENCOUNTER — ANESTHESIA EVENT (OUTPATIENT)
Dept: SURGERY | Facility: HOSPITAL | Age: 35
End: 2020-02-12
Payer: MEDICAID

## 2020-02-12 VITALS
HEART RATE: 102 BPM | SYSTOLIC BLOOD PRESSURE: 145 MMHG | DIASTOLIC BLOOD PRESSURE: 78 MMHG | BODY MASS INDEX: 32.1 KG/M2 | WEIGHT: 187 LBS

## 2020-02-12 DIAGNOSIS — O34.219 HISTORY OF CESAREAN DELIVERY AFFECTING PREGNANCY: ICD-10-CM

## 2020-02-12 DIAGNOSIS — O24.913 DIABETES MELLITUS AFFECTING PREGNANCY IN THIRD TRIMESTER: ICD-10-CM

## 2020-02-12 DIAGNOSIS — Z3A.37 37 WEEKS GESTATION OF PREGNANCY: Primary | ICD-10-CM

## 2020-02-12 DIAGNOSIS — O24.313 PRE-EXISTING DIABETES MELLITUS AFFECTING PREGNANCY IN THIRD TRIMESTER, ANTEPARTUM: ICD-10-CM

## 2020-02-12 DIAGNOSIS — O16.3 ELEVATED BLOOD PRESSURE AFFECTING PREGNANCY IN THIRD TRIMESTER, ANTEPARTUM: ICD-10-CM

## 2020-02-12 PROCEDURE — 99213 OFFICE O/P EST LOW 20 MIN: CPT | Mod: TH,S$PBB,, | Performed by: OBSTETRICS & GYNECOLOGY

## 2020-02-12 PROCEDURE — 99213 PR OFFICE/OUTPT VISIT, EST, LEVL III, 20-29 MIN: ICD-10-PCS | Mod: TH,S$PBB,, | Performed by: OBSTETRICS & GYNECOLOGY

## 2020-02-12 PROCEDURE — 99999 PR PBB SHADOW E&M-EST. PATIENT-LVL III: ICD-10-PCS | Mod: PBBFAC,,, | Performed by: OBSTETRICS & GYNECOLOGY

## 2020-02-12 PROCEDURE — 99999 PR PBB SHADOW E&M-EST. PATIENT-LVL III: CPT | Mod: PBBFAC,,, | Performed by: OBSTETRICS & GYNECOLOGY

## 2020-02-12 PROCEDURE — 99213 OFFICE O/P EST LOW 20 MIN: CPT | Mod: PBBFAC,TH,25 | Performed by: OBSTETRICS & GYNECOLOGY

## 2020-02-12 RX ORDER — MUPIROCIN 20 MG/G
OINTMENT TOPICAL
Status: CANCELLED | OUTPATIENT
Start: 2020-02-12

## 2020-02-12 RX ORDER — SODIUM CHLORIDE, SODIUM LACTATE, POTASSIUM CHLORIDE, CALCIUM CHLORIDE 600; 310; 30; 20 MG/100ML; MG/100ML; MG/100ML; MG/100ML
INJECTION, SOLUTION INTRAVENOUS CONTINUOUS
Status: CANCELLED | OUTPATIENT
Start: 2020-02-12

## 2020-02-12 RX ORDER — OXYTOCIN/RINGER'S LACTATE 30/500 ML
95 PLASTIC BAG, INJECTION (ML) INTRAVENOUS ONCE
Status: CANCELLED | OUTPATIENT
Start: 2020-02-12 | End: 2020-02-12

## 2020-02-12 RX ORDER — FAMOTIDINE 10 MG/ML
20 INJECTION INTRAVENOUS
Status: CANCELLED | OUTPATIENT
Start: 2020-02-12

## 2020-02-12 RX ORDER — OXYTOCIN/RINGER'S LACTATE 30/500 ML
334 PLASTIC BAG, INJECTION (ML) INTRAVENOUS ONCE
Status: CANCELLED | OUTPATIENT
Start: 2020-02-12 | End: 2020-02-12

## 2020-02-12 NOTE — ANESTHESIA PREPROCEDURE EVALUATION
02/12/2020  Kiana Hardy is a 34 y.o., female.    Pre-op Assessment    I have reviewed the Patient Summary Reports.    I have reviewed the Nursing Notes.   I have reviewed the Medications.     Review of Systems  Anesthesia Hx:  History of prior surgery of interest to airway management or planning: Denies Family Hx of Anesthesia complications.   Denies Personal Hx of Anesthesia complications.   Social:  Smoker, No Alcohol Use 1/2ppd smoker   Hematology/Oncology:  Hematology Normal   Oncology Normal    HEMATOLOGY: hct 35.    EENT/Dental:EENT/Dental Normal   Cardiovascular:   Exercise tolerance: good Hypertension, well controlled Marginal gestational HTN, diet controlled   Pulmonary:  Pulmonary Normal    Renal/:  Renal/ Normal     Hepatic/GI:  Hepatic/GI Normal    Musculoskeletal:  Musculoskeletal Normal    Neurological:  Neurology Normal    Endocrine:   Diabetes, well controlled, type 2, using insulin Hypothyroidism    Dermatological:  Skin Normal    Psych:  Psychiatric Normal           Physical Exam  General:  Well nourished    Airway/Jaw/Neck:  Airway Findings: Mouth Opening: Small, but > 3cm Mallampati: I  TM Distance: Normal, at least 6 cm  Jaw/Neck Findings:  Neck ROM: Normal ROM      Dental:  Dental Findings: In tact Rear broken/missing teeth, L lower loose        Mental Status:  Mental Status Findings:  Cooperative, Alert and Oriented         Anesthesia Plan  Type of Anesthesia, risks & benefits discussed:  Anesthesia Type:  spinal  Patient's Preference:   Intra-op Monitoring Plan: standard ASA monitors  Intra-op Monitoring Plan Comments:   Post Op Pain Control Plan: per primary service following discharge from PACU and multimodal analgesia  Post Op Pain Control Plan Comments:   Induction:   IV  Beta Blocker:  Patient is not currently on a Beta-Blocker (No further documentation required).        Informed Consent: Patient understands risks and agrees with Anesthesia plan.  Questions answered. Anesthesia consent signed with patient.  ASA Score: 2     Day of Surgery Review of History & Physical: I have interviewed and examined the patient. I have reviewed the patient's H&P dated: 2/14/2020. There are no significant changes.  H&P update referred to the surgeon.

## 2020-02-12 NOTE — DISCHARGE INSTRUCTIONS
Pre Admit Instructions    Day and Date of Procedure: Friday 2/14/20  Arrival time: 530am      · Call your doctor if you become ill before your surgery  · Someone will call you between 1 p.m. And 5 p.m.the workday before the procedure to give you an arrival time       - Before 7 a.m. Enter through Emergency Room  · You must have a responsible  to bring you home    Do NOT eat anything past: midnight  Ok to have clear liquids until: 4am  NO DAIRY    Please    · Do not wear makeup, jewelry, nail polish or body piercings  · Bring containers/solution for contacts, dentures, bridges - these and hearing aids will be removed before your procedure  · Do not bring cash, jewelry or valuables the day of your procedure   · No smoking at least 24 hours before your procedure  · Wear clothing that is comfortable and easy to take off and put on  · Do NOT shave for at least 5 days before your surgery    Review skin preparation handout before using. Hibiclens shower the night before your c section.                   Information About Your Procedure    Hand Outs about Rooming in, Skin-to-skin Bonding, Packing tips are in your packet  Before Surgery  1. Cafeteria Meals: 7am to 10am; 11am to 1:30 pm; Dinner/Supper must may be ordered between 11:00 am and 4 pm from the [facility] After Curb Call Menu. Food will be available to  between 5 pm and 6 pm. The kitchen phone extension is [phone].  2. Your doctor may order and review labs, x-rays, ECG or other tests as a pre-surgery workup. Your doctor will call you if there is need for follow up.   3. Someone will escort you to the OB department after you check in .  4. No smoking before surgery-CDC recommends 30 days, but at least 12 hours.  No smoking inside or outside the hospital on hospital grounds.  5. Call your doctor if you get sick before surgery-cold, flu, fever, urine infection or other.  6. Wear clothing that is easy to take off and put on.  The hospital will provide you  with a gown.  7. You may bring robe, slippers, nightwear, and toiletries (toothbrush, toothpaste, makeup). You can leave your luggage in your vehicle until after the delivery, so that you do not have to carry it around the hospital.   8. Brush your teeth and rinse your mouth the morning of surgery, but dont swallow the water.  9. The nurse will ask questions and check your condition.  The doctor may marcello your surgical site.  10. Compression boots may be put on your calves to reduce the risk of blood clots.  11. The doctor may order medicine to help you relax before surgery.  12. You and your baby will go back to the OB Department after your C. Section to recover after surgery.   After Surgery  1. The nurse will check your temperature, breathing, blood pressure, heart rate, IV site, and surgery site.  2. A diet will be ordered-most start with ice chips and then advance slowly to other foods.  3. If you have IV fluids the IV pump will beep to let the nurse know that she needs to check it.  4. You may have a urinary catheter and staff may measure your oral intake and urine output. When removed let the nurse know if you have trouble urinating. Uterus cannot contract properly to stop the bleeding if it is displaced by the bladder.   5. Pain medication may be ordered by the doctor after surgery.  If you have a pain management device tell your caretakers not to press the button because of OVERDOSE RISK. If you are breastfeeding the nurses will help you coordinate breast feeding with pain management.   6. When the nurse or doctor tells you it is okay to get out of bed, ask for help until you are stable.  7. The nurse may ask you to turn, cough, and deep breathe to prevent lung problems.  You can use a pillow to hold your incision when you deep breathe or cough to reduce pain.  8. The nurse will give you discharge instructions--incision care, symptoms to report to your doctor, and your follow-up appointment when you are  discharged.  You cannot drive yourself home.    Patient Responsibility to Reduce the Risk of Infections or Complications  Wash Hands and use Waterless Hand Sanitizers  · Wash hands frequently with soap and warm water for at least 15 seconds.   · Use hand sanitizers (alcohol based) often at home and in public if hands are not visibly soiled  Take Antibiotic Exactly as Prescribed  · Do not stop antibiotics too soon; you risk developing infection resistant to antibiotics  · Take your antibiotic even if you are feeling better and even if they upset your stomach  · Call the doctor if you cant tolerate the antibiotic or you have an allergic reaction  Stay Healthy  · Take medicines as prescribed by your doctor  · Keep your diabetes under control - diet and medication  · Get enough rest, exercise and eat a healthy diet  Keep the Wound Clean and Dry  · Wash hands before and after taking care of the incision (cut)  · Wash hands when you remove a dressing, before you touch/apply a new dressing  · Shower and clean incision with antibacterial soap and rinse well if the doctor approves  · Allow the cut to dry completely before putting on a clean dressing  · Do not touch the part of the bandage that will cover the incision  · Do not use ointments unless your doctor tells you to-can promote bacterial growth  · If ordered, put ointment directly on the dressing-do not touch the end of the tube  · Do not scrub, remove scabs, or leave a damp dressing on the incision  · Do not use peroxide or alcohol to clean the incision unless the doctor tells you to   · Do not let children, pets or anyone else contaminate the incision  Stop Smoking To Prevent Infection  · Stop smoking-Centers for Disease Control recommends 30 days before surgery  · Smokers get more infections after surgery-studies have shown 6 times the risk  · Smokers have more scarring and heal slower-open wounds get infected easier  Prevent Respiratory complications  · Stop  smoking  · Turn, cough, and deep breathe even if you have some pain when you do so.  · Splint your incision with a pillow when you cough/deep breath, to help control pain.  · Do not lie in one position for long periods of time.   Prevent Blood Clots  · When you wake move your legs, flex your feet, rotate your ankles, wiggle your toes  · Get up when the doctor says its ok.  Dangle your feet from the side of the bed  · Report symptoms-leg pain, redness/swelling, warm to touch; fever; shortness of breath, chest pain, severe upper back pain.

## 2020-02-12 NOTE — PROGRESS NOTES
Patient with no complaints.  No HA/vision changes/RUQ pain. Denies vaginal bleeding or contractions. Good FM.  Labor and pre-e precautions discused with patient.    Fastins-120s  2 hour pp:     Repeat  with BTL on Friday.  Consents reviewed.

## 2020-02-14 ENCOUNTER — HOSPITAL ENCOUNTER (INPATIENT)
Facility: HOSPITAL | Age: 35
LOS: 2 days | Discharge: HOME | End: 2020-02-16
Attending: OBSTETRICS & GYNECOLOGY | Admitting: OBSTETRICS & GYNECOLOGY
Payer: MEDICAID

## 2020-02-14 ENCOUNTER — ANESTHESIA (OUTPATIENT)
Dept: SURGERY | Facility: HOSPITAL | Age: 35
End: 2020-02-14
Payer: MEDICAID

## 2020-02-14 DIAGNOSIS — O34.219 HISTORY OF CESAREAN DELIVERY AFFECTING PREGNANCY: ICD-10-CM

## 2020-02-14 DIAGNOSIS — Z98.891 STATUS POST REPEAT LOW TRANSVERSE CESAREAN SECTION: ICD-10-CM

## 2020-02-14 DIAGNOSIS — O24.913 DIABETES MELLITUS AFFECTING PREGNANCY IN THIRD TRIMESTER: ICD-10-CM

## 2020-02-14 DIAGNOSIS — O24.313 PRE-EXISTING DIABETES MELLITUS AFFECTING PREGNANCY IN THIRD TRIMESTER, ANTEPARTUM: ICD-10-CM

## 2020-02-14 DIAGNOSIS — O16.3 ELEVATED BLOOD PRESSURE AFFECTING PREGNANCY IN THIRD TRIMESTER, ANTEPARTUM: ICD-10-CM

## 2020-02-14 PROBLEM — Z30.2 REQUEST FOR STERILIZATION: Status: ACTIVE | Noted: 2020-02-14

## 2020-02-14 PROBLEM — O99.280 THYROID DISEASE AFFECTING PREGNANCY: Status: ACTIVE | Noted: 2019-10-18

## 2020-02-14 PROBLEM — L02.91 ABSCESS: Status: RESOLVED | Noted: 2018-08-06 | Resolved: 2020-02-14

## 2020-02-14 LAB
ALBUMIN SERPL BCP-MCNC: 2.2 G/DL (ref 3.5–5.2)
ALP SERPL-CCNC: 168 U/L (ref 55–135)
ALT SERPL W/O P-5'-P-CCNC: 6 U/L (ref 10–44)
AMPHET+METHAMPHET UR QL: NEGATIVE
ANION GAP SERPL CALC-SCNC: 9 MMOL/L (ref 8–16)
AST SERPL-CCNC: 15 U/L (ref 10–40)
BARBITURATES UR QL SCN>200 NG/ML: NEGATIVE
BENZODIAZ UR QL SCN>200 NG/ML: NEGATIVE
BILIRUB SERPL-MCNC: 0.2 MG/DL (ref 0.1–1)
BUN SERPL-MCNC: 11 MG/DL (ref 6–20)
BZE UR QL SCN: NEGATIVE
CALCIUM SERPL-MCNC: 8.3 MG/DL (ref 8.7–10.5)
CANNABINOIDS UR QL SCN: NEGATIVE
CHLORIDE SERPL-SCNC: 107 MMOL/L (ref 95–110)
CO2 SERPL-SCNC: 20 MMOL/L (ref 23–29)
CREAT SERPL-MCNC: 0.6 MG/DL (ref 0.5–1.4)
CREAT UR-MCNC: 122.3 MG/DL (ref 15–325)
EST. GFR  (AFRICAN AMERICAN): >60 ML/MIN/1.73 M^2
EST. GFR  (NON AFRICAN AMERICAN): >60 ML/MIN/1.73 M^2
GLUCOSE SERPL-MCNC: 106 MG/DL (ref 70–110)
METHADONE UR QL SCN>300 NG/ML: NEGATIVE
OPIATES UR QL SCN: NEGATIVE
PCP UR QL SCN>25 NG/ML: NEGATIVE
POCT GLUCOSE: 100 MG/DL (ref 70–110)
POCT GLUCOSE: 107 MG/DL (ref 70–110)
POCT GLUCOSE: 184 MG/DL (ref 70–110)
POCT GLUCOSE: 70 MG/DL (ref 70–110)
POTASSIUM SERPL-SCNC: 3.6 MMOL/L (ref 3.5–5.1)
PROT SERPL-MCNC: 5.4 G/DL (ref 6–8.4)
SODIUM SERPL-SCNC: 136 MMOL/L (ref 136–145)
TOXICOLOGY INFORMATION: NORMAL

## 2020-02-14 PROCEDURE — 27000492 HC SLEEVE, SCD T/L

## 2020-02-14 PROCEDURE — 25000003 PHARM REV CODE 250

## 2020-02-14 PROCEDURE — 58700 PR REMOVAL OF FALLOPIAN TUBE: ICD-10-PCS | Mod: ,,, | Performed by: OBSTETRICS & GYNECOLOGY

## 2020-02-14 PROCEDURE — 63600175 PHARM REV CODE 636 W HCPCS: Performed by: OBSTETRICS & GYNECOLOGY

## 2020-02-14 PROCEDURE — 59514 PR CESAREAN DELIVERY ONLY: ICD-10-PCS | Mod: 80,AT,, | Performed by: OBSTETRICS & GYNECOLOGY

## 2020-02-14 PROCEDURE — C9399 UNCLASSIFIED DRUGS OR BIOLOG: HCPCS | Performed by: OBSTETRICS & GYNECOLOGY

## 2020-02-14 PROCEDURE — 36000708 HC OR TIME LEV III 1ST 15 MIN: Performed by: OBSTETRICS & GYNECOLOGY

## 2020-02-14 PROCEDURE — 01961 ANES CESAREAN DELIVERY ONLY: CPT | Mod: QZ | Performed by: NURSE ANESTHETIST, CERTIFIED REGISTERED

## 2020-02-14 PROCEDURE — 51702 INSERT TEMP BLADDER CATH: CPT

## 2020-02-14 PROCEDURE — 63600175 PHARM REV CODE 636 W HCPCS: Performed by: NURSE ANESTHETIST, CERTIFIED REGISTERED

## 2020-02-14 PROCEDURE — S0028 INJECTION, FAMOTIDINE, 20 MG: HCPCS | Performed by: OBSTETRICS & GYNECOLOGY

## 2020-02-14 PROCEDURE — 58700 REMOVAL OF FALLOPIAN TUBE: CPT | Mod: 80,,, | Performed by: OBSTETRICS & GYNECOLOGY

## 2020-02-14 PROCEDURE — 58700 PR REMOVAL OF FALLOPIAN TUBE: ICD-10-PCS | Mod: 80,,, | Performed by: OBSTETRICS & GYNECOLOGY

## 2020-02-14 PROCEDURE — 80053 COMPREHEN METABOLIC PANEL: CPT

## 2020-02-14 PROCEDURE — 59514 CESAREAN DELIVERY ONLY: CPT | Mod: 80,AT,, | Performed by: OBSTETRICS & GYNECOLOGY

## 2020-02-14 PROCEDURE — 80307 DRUG TEST PRSMV CHEM ANLYZR: CPT

## 2020-02-14 PROCEDURE — 88302 TISSUE EXAM BY PATHOLOGIST: CPT | Performed by: PATHOLOGY

## 2020-02-14 PROCEDURE — 37000009 HC ANESTHESIA EA ADD 15 MINS: Performed by: OBSTETRICS & GYNECOLOGY

## 2020-02-14 PROCEDURE — 25000003 PHARM REV CODE 250: Performed by: OBSTETRICS & GYNECOLOGY

## 2020-02-14 PROCEDURE — 25000003 PHARM REV CODE 250: Performed by: ADVANCED PRACTICE MIDWIFE

## 2020-02-14 PROCEDURE — 11000001 HC ACUTE MED/SURG PRIVATE ROOM

## 2020-02-14 PROCEDURE — 58700 REMOVAL OF FALLOPIAN TUBE: CPT | Mod: ,,, | Performed by: OBSTETRICS & GYNECOLOGY

## 2020-02-14 PROCEDURE — 59514 CESAREAN DELIVERY ONLY: CPT | Mod: AT,,, | Performed by: OBSTETRICS & GYNECOLOGY

## 2020-02-14 PROCEDURE — 88302 TISSUE EXAM BY PATHOLOGIST: CPT | Mod: 26,,, | Performed by: PATHOLOGY

## 2020-02-14 PROCEDURE — 59514 PR CESAREAN DELIVERY ONLY: ICD-10-PCS | Mod: AT,,, | Performed by: OBSTETRICS & GYNECOLOGY

## 2020-02-14 PROCEDURE — 36000709 HC OR TIME LEV III EA ADD 15 MIN: Performed by: OBSTETRICS & GYNECOLOGY

## 2020-02-14 PROCEDURE — 63600175 PHARM REV CODE 636 W HCPCS: Performed by: ADVANCED PRACTICE MIDWIFE

## 2020-02-14 PROCEDURE — 88302 PR  SURG PATH,LEVEL II: ICD-10-PCS | Mod: 26,,, | Performed by: PATHOLOGY

## 2020-02-14 PROCEDURE — 37000008 HC ANESTHESIA 1ST 15 MINUTES: Performed by: OBSTETRICS & GYNECOLOGY

## 2020-02-14 PROCEDURE — 36415 COLL VENOUS BLD VENIPUNCTURE: CPT

## 2020-02-14 RX ORDER — IBUPROFEN 200 MG
16 TABLET ORAL
Status: DISCONTINUED | OUTPATIENT
Start: 2020-02-14 | End: 2020-02-16 | Stop reason: HOSPADM

## 2020-02-14 RX ORDER — INSULIN ASPART 100 [IU]/ML
1-10 INJECTION, SOLUTION INTRAVENOUS; SUBCUTANEOUS
Status: DISCONTINUED | OUTPATIENT
Start: 2020-02-14 | End: 2020-02-16 | Stop reason: HOSPADM

## 2020-02-14 RX ORDER — SODIUM CHLORIDE, SODIUM LACTATE, POTASSIUM CHLORIDE, CALCIUM CHLORIDE 600; 310; 30; 20 MG/100ML; MG/100ML; MG/100ML; MG/100ML
INJECTION, SOLUTION INTRAVENOUS CONTINUOUS
Status: DISCONTINUED | OUTPATIENT
Start: 2020-02-14 | End: 2020-02-15

## 2020-02-14 RX ORDER — INSULIN ASPART 100 [IU]/ML
1-10 INJECTION, SOLUTION INTRAVENOUS; SUBCUTANEOUS
Status: DISCONTINUED | OUTPATIENT
Start: 2020-02-14 | End: 2020-02-14

## 2020-02-14 RX ORDER — KETOROLAC TROMETHAMINE 30 MG/ML
30 INJECTION, SOLUTION INTRAMUSCULAR; INTRAVENOUS EVERY 8 HOURS
Status: COMPLETED | OUTPATIENT
Start: 2020-02-14 | End: 2020-02-15

## 2020-02-14 RX ORDER — LEVOTHYROXINE SODIUM 25 UG/1
25 TABLET ORAL DAILY
Status: DISCONTINUED | OUTPATIENT
Start: 2020-02-14 | End: 2020-02-16

## 2020-02-14 RX ORDER — IBUPROFEN 800 MG/1
800 TABLET ORAL EVERY 8 HOURS
Status: DISCONTINUED | OUTPATIENT
Start: 2020-02-15 | End: 2020-02-16 | Stop reason: HOSPADM

## 2020-02-14 RX ORDER — HYDROCORTISONE 25 MG/G
CREAM TOPICAL 3 TIMES DAILY PRN
Status: DISCONTINUED | OUTPATIENT
Start: 2020-02-14 | End: 2020-02-16 | Stop reason: HOSPADM

## 2020-02-14 RX ORDER — LABETALOL HYDROCHLORIDE 5 MG/ML
40 INJECTION, SOLUTION INTRAVENOUS ONCE AS NEEDED
Status: DISCONTINUED | OUTPATIENT
Start: 2020-02-14 | End: 2020-02-16 | Stop reason: HOSPADM

## 2020-02-14 RX ORDER — IBUPROFEN 200 MG
24 TABLET ORAL
Status: DISCONTINUED | OUTPATIENT
Start: 2020-02-14 | End: 2020-02-16 | Stop reason: HOSPADM

## 2020-02-14 RX ORDER — ACETAMINOPHEN 10 MG/ML
INJECTION, SOLUTION INTRAVENOUS
Status: DISCONTINUED | OUTPATIENT
Start: 2020-02-14 | End: 2020-02-14

## 2020-02-14 RX ORDER — OXYTOCIN/RINGER'S LACTATE 30/500 ML
95 PLASTIC BAG, INJECTION (ML) INTRAVENOUS ONCE
Status: DISCONTINUED | OUTPATIENT
Start: 2020-02-14 | End: 2020-02-16 | Stop reason: HOSPADM

## 2020-02-14 RX ORDER — DIPHENHYDRAMINE HCL 25 MG
25 CAPSULE ORAL EVERY 4 HOURS PRN
Status: DISCONTINUED | OUTPATIENT
Start: 2020-02-14 | End: 2020-02-16 | Stop reason: HOSPADM

## 2020-02-14 RX ORDER — LABETALOL HYDROCHLORIDE 5 MG/ML
20 INJECTION, SOLUTION INTRAVENOUS ONCE AS NEEDED
Status: COMPLETED | OUTPATIENT
Start: 2020-02-14 | End: 2020-02-14

## 2020-02-14 RX ORDER — MUPIROCIN 20 MG/G
OINTMENT TOPICAL 2 TIMES DAILY
Status: DISCONTINUED | OUTPATIENT
Start: 2020-02-14 | End: 2020-02-16 | Stop reason: HOSPADM

## 2020-02-14 RX ORDER — HYDROMORPHONE HYDROCHLORIDE 2 MG/ML
1 INJECTION, SOLUTION INTRAMUSCULAR; INTRAVENOUS; SUBCUTANEOUS EVERY 6 HOURS PRN
Status: DISCONTINUED | OUTPATIENT
Start: 2020-02-14 | End: 2020-02-16 | Stop reason: HOSPADM

## 2020-02-14 RX ORDER — BISACODYL 10 MG
10 SUPPOSITORY, RECTAL RECTAL ONCE AS NEEDED
Status: DISCONTINUED | OUTPATIENT
Start: 2020-02-14 | End: 2020-02-16 | Stop reason: HOSPADM

## 2020-02-14 RX ORDER — LABETALOL HYDROCHLORIDE 5 MG/ML
80 INJECTION, SOLUTION INTRAVENOUS ONCE
Status: DISCONTINUED | OUTPATIENT
Start: 2020-02-14 | End: 2020-02-15

## 2020-02-14 RX ORDER — LABETALOL HYDROCHLORIDE 5 MG/ML
40 INJECTION, SOLUTION INTRAVENOUS ONCE
Status: DISCONTINUED | OUTPATIENT
Start: 2020-02-14 | End: 2020-02-15

## 2020-02-14 RX ORDER — MUPIROCIN 20 MG/G
OINTMENT TOPICAL
Status: DISCONTINUED | OUTPATIENT
Start: 2020-02-14 | End: 2020-02-14

## 2020-02-14 RX ORDER — DOCUSATE SODIUM 100 MG/1
200 CAPSULE, LIQUID FILLED ORAL 2 TIMES DAILY
Status: DISCONTINUED | OUTPATIENT
Start: 2020-02-14 | End: 2020-02-16 | Stop reason: HOSPADM

## 2020-02-14 RX ORDER — METOCLOPRAMIDE HYDROCHLORIDE 5 MG/ML
10 INJECTION INTRAMUSCULAR; INTRAVENOUS ONCE
Status: COMPLETED | OUTPATIENT
Start: 2020-02-14 | End: 2020-02-14

## 2020-02-14 RX ORDER — SIMETHICONE 80 MG
1 TABLET,CHEWABLE ORAL EVERY 6 HOURS PRN
Status: DISCONTINUED | OUTPATIENT
Start: 2020-02-14 | End: 2020-02-16 | Stop reason: HOSPADM

## 2020-02-14 RX ORDER — BUPIVACAINE HYDROCHLORIDE 7.5 MG/ML
INJECTION, SOLUTION EPIDURAL; RETROBULBAR
Status: COMPLETED | OUTPATIENT
Start: 2020-02-14 | End: 2020-02-14

## 2020-02-14 RX ORDER — CEFAZOLIN SODIUM 1 G/3ML
INJECTION, POWDER, FOR SOLUTION INTRAMUSCULAR; INTRAVENOUS
Status: DISCONTINUED | OUTPATIENT
Start: 2020-02-14 | End: 2020-02-14

## 2020-02-14 RX ORDER — OXYCODONE AND ACETAMINOPHEN 10; 325 MG/1; MG/1
1 TABLET ORAL EVERY 4 HOURS PRN
Status: DISCONTINUED | OUTPATIENT
Start: 2020-02-14 | End: 2020-02-16 | Stop reason: HOSPADM

## 2020-02-14 RX ORDER — CITALOPRAM 10 MG/1
10 TABLET ORAL DAILY
Status: DISCONTINUED | OUTPATIENT
Start: 2020-02-14 | End: 2020-02-16 | Stop reason: HOSPADM

## 2020-02-14 RX ORDER — SODIUM CITRATE AND CITRIC ACID MONOHYDRATE 334; 500 MG/5ML; MG/5ML
30 SOLUTION ORAL ONCE
Status: COMPLETED | OUTPATIENT
Start: 2020-02-14 | End: 2020-02-14

## 2020-02-14 RX ORDER — MAGNESIUM SULFATE HEPTAHYDRATE 40 MG/ML
4 INJECTION, SOLUTION INTRAVENOUS ONCE
Status: COMPLETED | OUTPATIENT
Start: 2020-02-14 | End: 2020-02-14

## 2020-02-14 RX ORDER — LABETALOL HYDROCHLORIDE 5 MG/ML
80 INJECTION, SOLUTION INTRAVENOUS ONCE AS NEEDED
Status: DISCONTINUED | OUTPATIENT
Start: 2020-02-14 | End: 2020-02-16 | Stop reason: HOSPADM

## 2020-02-14 RX ORDER — LABETALOL HYDROCHLORIDE 5 MG/ML
20 INJECTION, SOLUTION INTRAVENOUS ONCE
Status: DISCONTINUED | OUTPATIENT
Start: 2020-02-14 | End: 2020-02-15

## 2020-02-14 RX ORDER — ADHESIVE BANDAGE
30 BANDAGE TOPICAL 2 TIMES DAILY PRN
Status: DISCONTINUED | OUTPATIENT
Start: 2020-02-15 | End: 2020-02-16 | Stop reason: HOSPADM

## 2020-02-14 RX ORDER — CEFAZOLIN SODIUM 2 G/50ML
2 SOLUTION INTRAVENOUS
Status: COMPLETED | OUTPATIENT
Start: 2020-02-14 | End: 2020-02-14

## 2020-02-14 RX ORDER — MAGNESIUM SULFATE HEPTAHYDRATE 40 MG/ML
2 INJECTION, SOLUTION INTRAVENOUS CONTINUOUS
Status: DISCONTINUED | OUTPATIENT
Start: 2020-02-14 | End: 2020-02-15

## 2020-02-14 RX ORDER — OXYCODONE AND ACETAMINOPHEN 5; 325 MG/1; MG/1
1 TABLET ORAL EVERY 4 HOURS PRN
Status: DISCONTINUED | OUTPATIENT
Start: 2020-02-14 | End: 2020-02-16 | Stop reason: HOSPADM

## 2020-02-14 RX ORDER — HYDRALAZINE HYDROCHLORIDE 20 MG/ML
10 INJECTION INTRAMUSCULAR; INTRAVENOUS ONCE AS NEEDED
Status: DISCONTINUED | OUTPATIENT
Start: 2020-02-14 | End: 2020-02-16 | Stop reason: HOSPADM

## 2020-02-14 RX ORDER — FENTANYL CITRATE 50 UG/ML
INJECTION, SOLUTION INTRAMUSCULAR; INTRAVENOUS
Status: DISCONTINUED | OUTPATIENT
Start: 2020-02-14 | End: 2020-02-14

## 2020-02-14 RX ORDER — SODIUM CHLORIDE, SODIUM LACTATE, POTASSIUM CHLORIDE, CALCIUM CHLORIDE 600; 310; 30; 20 MG/100ML; MG/100ML; MG/100ML; MG/100ML
INJECTION, SOLUTION INTRAVENOUS CONTINUOUS
Status: ACTIVE | OUTPATIENT
Start: 2020-02-14 | End: 2020-02-15

## 2020-02-14 RX ORDER — ONDANSETRON 8 MG/1
8 TABLET, ORALLY DISINTEGRATING ORAL EVERY 8 HOURS PRN
Status: DISCONTINUED | OUTPATIENT
Start: 2020-02-14 | End: 2020-02-16 | Stop reason: HOSPADM

## 2020-02-14 RX ORDER — SODIUM CHLORIDE, SODIUM LACTATE, POTASSIUM CHLORIDE, CALCIUM CHLORIDE 600; 310; 30; 20 MG/100ML; MG/100ML; MG/100ML; MG/100ML
INJECTION, SOLUTION INTRAVENOUS CONTINUOUS
Status: DISCONTINUED | OUTPATIENT
Start: 2020-02-14 | End: 2020-02-14

## 2020-02-14 RX ORDER — AMOXICILLIN 250 MG
1 CAPSULE ORAL NIGHTLY PRN
Status: DISCONTINUED | OUTPATIENT
Start: 2020-02-14 | End: 2020-02-16 | Stop reason: HOSPADM

## 2020-02-14 RX ORDER — OXYTOCIN/RINGER'S LACTATE 30/500 ML
PLASTIC BAG, INJECTION (ML) INTRAVENOUS
Status: DISCONTINUED | OUTPATIENT
Start: 2020-02-14 | End: 2020-02-14

## 2020-02-14 RX ORDER — CALCIUM GLUCONATE 98 MG/ML
1 INJECTION, SOLUTION INTRAVENOUS
Status: DISCONTINUED | OUTPATIENT
Start: 2020-02-14 | End: 2020-02-16 | Stop reason: HOSPADM

## 2020-02-14 RX ORDER — PRENATAL WITH FERROUS FUM AND FOLIC ACID 3080; 920; 120; 400; 22; 1.84; 3; 20; 10; 1; 12; 200; 27; 25; 2 [IU]/1; [IU]/1; MG/1; [IU]/1; MG/1; MG/1; MG/1; MG/1; MG/1; MG/1; UG/1; MG/1; MG/1; MG/1; MG/1
1 TABLET ORAL DAILY
Status: DISCONTINUED | OUTPATIENT
Start: 2020-02-14 | End: 2020-02-16 | Stop reason: HOSPADM

## 2020-02-14 RX ORDER — FAMOTIDINE 10 MG/ML
20 INJECTION INTRAVENOUS
Status: DISCONTINUED | OUTPATIENT
Start: 2020-02-14 | End: 2020-02-14

## 2020-02-14 RX ORDER — GLUCAGON 1 MG
1 KIT INJECTION
Status: DISCONTINUED | OUTPATIENT
Start: 2020-02-14 | End: 2020-02-16 | Stop reason: HOSPADM

## 2020-02-14 RX ADMIN — DIPHENHYDRAMINE HYDROCHLORIDE 25 MG: 25 CAPSULE ORAL at 09:02

## 2020-02-14 RX ADMIN — METOCLOPRAMIDE 10 MG: 5 INJECTION, SOLUTION INTRAMUSCULAR; INTRAVENOUS at 05:02

## 2020-02-14 RX ADMIN — FAMOTIDINE 20 MG: 10 INJECTION INTRAVENOUS at 05:02

## 2020-02-14 RX ADMIN — OXYCODONE HYDROCHLORIDE AND ACETAMINOPHEN 1 TABLET: 10; 325 TABLET ORAL at 12:02

## 2020-02-14 RX ADMIN — MAGNESIUM SULFATE HEPTAHYDRATE 2 G/HR: 40 INJECTION, SOLUTION INTRAVENOUS at 06:02

## 2020-02-14 RX ADMIN — SODIUM CHLORIDE, SODIUM LACTATE, POTASSIUM CHLORIDE, AND CALCIUM CHLORIDE: .6; .31; .03; .02 INJECTION, SOLUTION INTRAVENOUS at 06:02

## 2020-02-14 RX ADMIN — HYDROMORPHONE HYDROCHLORIDE 1 MG: 2 INJECTION, SOLUTION INTRAMUSCULAR; INTRAVENOUS; SUBCUTANEOUS at 04:02

## 2020-02-14 RX ADMIN — FENTANYL CITRATE 75 MCG: 50 INJECTION, SOLUTION INTRAMUSCULAR; INTRAVENOUS at 07:02

## 2020-02-14 RX ADMIN — Medication 1 ML: at 07:02

## 2020-02-14 RX ADMIN — BUPIVACAINE HYDROCHLORIDE 1.4 ML: 7.5 INJECTION, SOLUTION EPIDURAL; RETROBULBAR at 07:02

## 2020-02-14 RX ADMIN — SODIUM CITRATE AND CITRIC ACID MONOHYDRATE 30 ML: 500; 334 SOLUTION ORAL at 05:02

## 2020-02-14 RX ADMIN — PROMETHAZINE HYDROCHLORIDE 12.5 MG: 25 INJECTION INTRAMUSCULAR; INTRAVENOUS at 05:02

## 2020-02-14 RX ADMIN — ONDANSETRON 8 MG: 8 TABLET, ORALLY DISINTEGRATING ORAL at 04:02

## 2020-02-14 RX ADMIN — DOCUSATE SODIUM 200 MG: 100 CAPSULE, LIQUID FILLED ORAL at 09:02

## 2020-02-14 RX ADMIN — CEFAZOLIN SODIUM 2 G: 2 SOLUTION INTRAVENOUS at 07:02

## 2020-02-14 RX ADMIN — KETOROLAC TROMETHAMINE 30 MG: 30 INJECTION, SOLUTION INTRAMUSCULAR at 01:02

## 2020-02-14 RX ADMIN — INSULIN ASPART 1 UNITS: 100 INJECTION, SOLUTION INTRAVENOUS; SUBCUTANEOUS at 09:02

## 2020-02-14 RX ADMIN — LABETALOL HYDROCHLORIDE 20 MG: 5 INJECTION, SOLUTION INTRAVENOUS at 09:02

## 2020-02-14 RX ADMIN — SODIUM CHLORIDE, SODIUM LACTATE, POTASSIUM CHLORIDE, AND CALCIUM CHLORIDE 1000 ML: .6; .31; .03; .02 INJECTION, SOLUTION INTRAVENOUS at 05:02

## 2020-02-14 RX ADMIN — INSULIN DETEMIR 16 UNITS: 100 INJECTION, SOLUTION SUBCUTANEOUS at 09:02

## 2020-02-14 RX ADMIN — SODIUM CHLORIDE, SODIUM LACTATE, POTASSIUM CHLORIDE, AND CALCIUM CHLORIDE: .6; .31; .03; .02 INJECTION, SOLUTION INTRAVENOUS at 07:02

## 2020-02-14 RX ADMIN — ACETAMINOPHEN 1000 MG: 10 INJECTION, SOLUTION INTRAVENOUS at 07:02

## 2020-02-14 RX ADMIN — MAGNESIUM SULFATE HEPTAHYDRATE 4 G: 40 INJECTION, SOLUTION INTRAVENOUS at 07:02

## 2020-02-14 RX ADMIN — FENTANYL CITRATE 25 MCG: 50 INJECTION, SOLUTION INTRAMUSCULAR; INTRAVENOUS at 07:02

## 2020-02-14 RX ADMIN — OXYCODONE HYDROCHLORIDE AND ACETAMINOPHEN 1 TABLET: 10; 325 TABLET ORAL at 09:02

## 2020-02-14 RX ADMIN — KETOROLAC TROMETHAMINE 30 MG: 30 INJECTION, SOLUTION INTRAMUSCULAR at 09:02

## 2020-02-14 RX ADMIN — CEFAZOLIN 2 G: 1 INJECTION, POWDER, FOR SOLUTION INTRAVENOUS at 07:02

## 2020-02-14 NOTE — SUBJECTIVE & OBJECTIVE
"Obstetric HPI:  Patient reports no contractions, active fetal movement, No vaginal bleeding , No loss of fluid     This pregnancy has been complicated by DM2, GBS bacteriuria, ex-lap during pregnancy with RSO.    OB History    Para Term  AB Living   3 1 1 0 1 1   SAB TAB Ectopic Multiple Live Births   1 0 0 0 1      # Outcome Date GA Lbr Umair/2nd Weight Sex Delivery Anes PTL Lv   3 Current            2 Term 10/09/02 38w0d  3.374 kg (7 lb 7 oz) F CS-LTranv  N FERNANDO      Complications: Breech birth      Name: Wilmer Hooper SAB  8w0d             Birth Comments: D&C     Past Medical History:   Diagnosis Date    Diabetes mellitus     Pregnancy 2019    Thyroid disease     hypothyroidism    Thyroid disease during pregnancy, second trimester 10/18/2019     Past Surgical History:   Procedure Laterality Date     SECTION      CHOLECYSTECTOMY      DILATION AND CURETTAGE OF UTERUS      FOOT SURGERY Right     has a nail in the foot    SALPINGOOPHORECTOMY Right 2019    Procedure: RIGHT SALPINGO-OOPHORECTOMY USO;  Surgeon: Mary Iglesias MD;  [facility];  Service: OB/GYN;  Laterality: Right;    TONSILLECTOMY         PTA Medications   Medication Sig    aspirin (ECOTRIN) 81 MG EC tablet Take 81 mg by mouth once daily.    CLASSIC PRENATAL 28 mg iron- 800 mcg Tab Take 1 tablet by mouth once daily.    insulin glargine (LANTUS U-100 INSULIN) 100 unit/mL injection Inject 34 Units into the skin every evening.    levothyroxine (SYNTHROID) 25 MCG tablet Take 25 mcg by mouth once daily.    metFORMIN (GLUCOPHAGE) 1000 MG tablet Take 1 tablet (1,000 mg total) by mouth 2 (two) times daily with meals.    BD INSULIN SYRINGE ULTRA-FINE 0.5 mL 31 gauge x 5/16" Syrg Inject 1 Syringe into the skin once daily.    blood sugar diagnostic (RELION PRIME TEST STRIPS) Strp 1 strip by Misc.(Non-Drug; Combo Route) route 4 (four) times daily.    citalopram (CELEXA) 10 MG tablet citalopram 10 mg " "tablet   Take 1 tablet(s) every day by oral route.    insulin syringe-needle U-100 (BD INSULIN SYRINGE ULTRA-FINE) 0.3 mL 31 gauge x 5/16" Syrg Use in the evening as directed.    OPW TEST CLAIM - DO NOT FILL Inject into the skin. OPW test claim. Do not fill.    OPW TEST CLAIM - DO NOT FILL Inject into the skin. OPW test claim. Do not fill.    pen needle, diabetic 29 gauge x 1/2" Ndle Use four times daily with insulin pens    TRUE METRIX GLUCOSE TEST STRIP Strp     TRUEPLUS LANCETS 28 gauge Misc        Review of patient's allergies indicates:  No Known Allergies     Family History     Problem Relation (Age of Onset)    Breast cancer Maternal Grandmother    COPD Mother    Diabetes Father        Tobacco Use    Smoking status: Current Every Day Smoker     Packs/day: 0.50     Years: 15.00     Pack years: 7.50     Types: Cigarettes    Smokeless tobacco: Never Used   Substance and Sexual Activity    Alcohol use: Not Currently    Drug use: Yes     Types: Methamphetamines, Marijuana     Comment: last use over a year    Sexual activity: Yes     Partners: Male     Birth control/protection: None     Comment:      Review of Systems   Constitutional: Negative for activity change, appetite change, chills, diaphoresis, fatigue and fever.   Eyes: Negative for visual disturbance.   Respiratory: Negative for cough, shortness of breath and wheezing.    Cardiovascular: Negative for chest pain and palpitations.   Gastrointestinal: Negative for abdominal pain, constipation, diarrhea, nausea and vomiting.   Genitourinary: Negative for dysuria, genital sores, pelvic pain, urgency, vaginal bleeding, vaginal discharge, vaginal pain and vaginal odor.   Musculoskeletal: Negative for back pain, joint swelling and myalgias.   Integumentary:  Negative for rash.   Neurological: Negative for seizures, syncope, numbness and headaches.   Hematological: Negative for adenopathy. Does not bruise/bleed easily.   Psychiatric/Behavioral: " Negative for depression. The patient is not nervous/anxious.       Objective:     Vital Signs (Most Recent):  Temp: 98.8 °F (37.1 °C) (02/14/20 0529)  Pulse: 95 (02/14/20 0646)  Resp: 20 (02/14/20 0530)  BP: (Abnormal) 141/83 (02/14/20 0646)  SpO2: 96 % (02/14/20 0530) Vital Signs (24h Range):  Temp:  [98.8 °F (37.1 °C)] 98.8 °F (37.1 °C)  Pulse:  [] 95  Resp:  [20] 20  SpO2:  [96 %] 96 %  BP: (135-160)/() 141/83     Weight: 84.4 kg (186 lb)  Body mass index is 31.93 kg/m².    FHT: 150 Cat 1 (reassuring)  TOCO:  no contractions    Physical Exam:   Constitutional: She is oriented to person, place, and time. She appears well-developed and well-nourished. No distress.    HENT:   Head: Normocephalic and atraumatic.    Eyes: Conjunctivae and EOM are normal.    Neck: Normal range of motion. Neck supple.     Pulmonary/Chest: Effort normal.                  Musculoskeletal: Normal range of motion.       Neurological: She is alert and oriented to person, place, and time.    Skin: Skin is warm and dry.    Psychiatric: She has a normal mood and affect. Her behavior is normal. Judgment and thought content normal.         Significant Labs:  Lab Results   Component Value Date    [facility] POS 02/12/2020       I have personallly reviewed all pertinent lab results from the last 24 hours.

## 2020-02-14 NOTE — ASSESSMENT & PLAN NOTE
Repeat   Patient cleared for Anesthesia: Epidural    Anesthesia/Surgery risks, benefits, and alternative options discussed and understood by patient/fa

## 2020-02-14 NOTE — PROGRESS NOTES
Patient found sitting up at bedside trying to get to restroom. Will recheck BP upon return to bed.

## 2020-02-14 NOTE — L&D DELIVERY NOTE
[facility]   Section   Operative Note    SUMMARY     Date of Procedure: 2020     Procedure: Repeat Low Transverse  Delivery, Left Salpingectomy    Surgeon(s) and Role:     * Onelia Crespo MD - Primary     * Addis Villasenor MD - Assisting      Pre-Operative Diagnosis:   IUP @ 38 0/7 WGA  History of  section complicating pregnancy [O34.219]  AMA (advanced maternal age) multigravida 35+, third trimester [O09.523]  Pre-existing diabetes mellitus affecting pregnancy, antepartum [O24.319]  Gestational hypertension  Gestational thrombocytopenia  H/o RSO    Post-Operative Diagnosis: same    Anesthesia: Spinal    Complications: No    Blood Loss:  mL    DVT prophylaxis: Bilateral sequential compression devices    Perioperative antibiotics: Ancef    Specimen: Left fallopian tube segment and fimbria    Operative Findings: viable female infant in the vertex presentation. Amniotic fluid clear and copious. Normal appearing uterus, absence of right tube and ovary.  Left tube with adhesions present. Normal left ovary.    OPERATIVE NOTE:  The patient was taken to the operating room were spinal anesthesia was found to be adequate. She was prepped and draped in the normal sterile fashion in the dorsal supine position. A noriega catheter was in place draining clear urine. A pfannenstiel skin incision was then made with the knife and carried through to the underlying layer of fascia with the bovie. The fascia was incised in the midline. The fascial incision was extended laterally with the curved lynn scissors. The superior aspect of the incision was grasped with two kocher clamps and elevated up. The superior aspect of the fascial incision was sharply dissected off. The same technique was used to take down the inferior aspect of the fascia. The muscle was  in the midline and the peritoneum was identified. The peritoneum was elevated up with hemastats and entered  sharply. The peritoneal incision was then extended superiorly and inferiorly with good visualization of the surrounding tissue. The bladder blade was inserted and the vesicouterine peritoneum was identified and a bladder flap was made. A low transverse incision was then made on the uterus and the incision was bluntly extended. The infant's head was grasped and brought to the incision. The assistant then applied gentle fundal pressure and the infant was delivered without difficulty. The nose and mouth was suctioned and the cord was clamped and cut. The infant was then handed off to the awaiting nursery personnel. Cord blood was obtained for the infant's blood type. The placenta was removed, the uterus was exteriorized and cleaned of all clot and debris. The uterine incision was repaired with #1 PDS in a running locked fashion. A second layer was used for imbrication. A 2-0 Vicryl was used to close the bladder flap.  The right side of the uterus was examined and prior RSO confirmed, no scaring in the area. The left tube was then grasped.  An incision was make with the Bovie in the mesosalpinx and a Laona tubal ligation was performed to ligate and then excise the left fallopian tube segment.  The fimbria were then grasped and the distal end of the tube was suture ligated with a 2-0 Chromic. The distal endo of the tube was excised.  The uterus was returned to the abdomen. A 2-0 Vicryl was used to re-approximate the peritoneum. The muscle was re-approximated with 2-0 Chromic. The fascia was then closed with 0 Vicryl in a running fashion. The subcutaneous tissue was re-approximated with 3-0 Vicryl. The skin was closed with staples.  An Aquacel dressing was placed over the incision. The patient tolerated the procedure well. Sponge, needle, and instrument counts were correct x2.            Delivery Information for Radha Hardy    Birth information:  YOB: 2022   Time of birth: 7:53 AM   Sex:  female   Head Delivery Date/Time: 2020  7:53 AM   Delivery type: , Low Transverse   Gestational Age: 38w0d    Delivery Providers    Delivering clinician:  Onelia Crespo MD           Measurements    Weight:  4536 g  Length:  (No Documentation)         Apgars    Living status:  Living  Apgars:   1 min.:   5 min.:   10 min.:   15 min.:   20 min.:     Skin color:   0  1       Heart rate:   2  2       Reflex irritability:   2  2       Muscle tone:   2  2       Respiratory effort:   2  2       Total:   8  9       Apgars assigned by:  DR. LEMON         Operative Delivery    Forceps attempted?:  No  Vacuum extractor attempted?:  No         Shoulder Dystocia    Shoulder dystocia present?:  No           Presentation    Presentation:  Vertex           Interventions/Resuscitation    Method:  Bulb Suctioning, Tactile Stimulation       Cord    Vessels:  3 vessels  Complications:  None  Delayed Cord Clamping?:  Yes  Cord Clamped Date/Time:  2020  7:54 AM  Cord Blood Disposition:  Lab  Gases Sent?:  No       Placenta    Placenta delivery date/time:  2020 0756  Placenta removal:  Manual removal  Placenta appearance:  Intact  Placenta disposition:  discarded           Labor Events:       labor: No     Labor Onset Date/Time:         Dilation Complete Date/Time:         Start Pushing Date/Time:         Start Pushing Date/Time:       Rupture Date/Time:              Rupture type:           Fluid Amount:        Fluid Color:        Fluid Odor:        Membrane Status: INT (Intact)               steroids: None     Antibiotics given for GBS: No     Induction:       Indications for induction:        Augmentation:       Indications for augmentation:       Labor complications: None     Additional complications:          Cervical ripening:                     Delivery:      Episiotomy:       Indication for Episiotomy:       Perineal Lacerations:   Repaired:      Periurethral Laceration:   Repaired:      Labial Laceration:   Repaired:     Sulcus Laceration:   Repaired:     Vaginal Laceration:   Repaired:     Cervical Laceration:   Repaired:     Repair suture:       Repair # of packets:       Last Value - EBL - Nursing (mL):       Sum - EBL - Nursing (mL): 0     Last Value - EBL - Anesthesia (mL):      Calculated QBL (mL): 515      Vaginal Sweep Performed:       Surgicount Correct: Yes       Other providers:       Anesthesia    Method:  Spinal          Details (if applicable):  Trial of Labor No    Categorization: Repeat    Priority: Routine   Indications for : Repeat Section   Incision Type: low transverse     Additional  information:  Forceps:    Vacuum:    Breech:    Observed anomalies    Other (Comments):  in OR after spinal

## 2020-02-14 NOTE — HPI
Pt is a  @ 38 0/7 WGA who presents for scheduled repeat  and BTL.  Pt's pregnancy complicated by poorly controlled DM2.  She had an ex-lap with RSO while pregnant for large but ultimately benign ovarian mass.

## 2020-02-14 NOTE — HOSPITAL COURSE
Pt admitted for scheduled repeat  and BTL. Mag prophylaxis and PRN Labetalol   pod2 Cont Mag x 24 hrs then dc PRN Labetalol, Procardia 30 mg po daily after Mag dc   POD3 DC to home, Procardia rx f/u 1 week with bp check 2 weeks with provider

## 2020-02-14 NOTE — ANESTHESIA POSTPROCEDURE EVALUATION
Anesthesia Post Evaluation    Patient: Kiana Hardy    Procedure(s) Performed: Procedure(s) (LRB):  REPEAT  SECTION, WITH BILATERAL TUBAL LIGATION (N/A)    Final Anesthesia Type: spinal    Patient location during evaluation: labor & delivery  Patient participation: Yes- Able to Participate  Level of consciousness: awake and alert, oriented and awake  Post-procedure vital signs: reviewed and stable  Pain management: adequate  Airway patency: patent    PONV status at discharge: No PONV  Anesthetic complications: no      Cardiovascular status: blood pressure returned to baseline, hemodynamically stable and stable  Respiratory status: unassisted, spontaneous ventilation and room air  Hydration status: euvolemic  Follow-up not needed.          Vitals Value Taken Time   /80 2020  9:58 AM   Temp 36.8 °C (98.2 °F) 2020  9:39 AM   Pulse 94 2020  9:58 AM   Resp 18 2020  8:33 AM   SpO2 98 % 2020  9:47 AM   Vitals shown include unvalidated device data.      No case tracking events are documented in the log.      Pain/Chano Score: Chano Score: 7 (2020  9:30 AM)

## 2020-02-14 NOTE — H&P
[facility]  Obstetrics  History & Physical    Patient Name: Kiana Hardy  MRN: [id number]  Admission Date: 2020  Primary Care Provider: Grady Billings MD    Subjective:     Principal Problem:<principal problem not specified>    History of Present Illness:  Pt is a  @ 38 0/7 WGA who presents for scheduled repeat  and BTL.  Pt's pregnancy complicated by poorly controlled DM2.  She had an ex-lap with RSO while pregnant for large but ultimately benign ovarian mass.     Obstetric HPI:  Patient reports no contractions, active fetal movement, No vaginal bleeding , No loss of fluid     This pregnancy has been complicated by DM2, GBS bacteriuria, ex-lap during pregnancy with RSO.    OB History    Para Term  AB Living   3 1 1 0 1 1   SAB TAB Ectopic Multiple Live Births   1 0 0 0 1      # Outcome Date GA Lbr Umair/2nd Weight Sex Delivery Anes PTL Lv   3 Current            2 Term 10/09/02 38w0d  3.374 kg (7 lb 7 oz) F CS-LTranv  N FERNANDO      Complications: Breech birth      Name: Wilmer   1 SAB  8w0d             Birth Comments: D&C     Past Medical History:   Diagnosis Date    Diabetes mellitus     Pregnancy 2019    Thyroid disease     hypothyroidism    Thyroid disease during pregnancy, second trimester 10/18/2019     Past Surgical History:   Procedure Laterality Date     SECTION      CHOLECYSTECTOMY      DILATION AND CURETTAGE OF UTERUS      FOOT SURGERY Right     has a nail in the foot    SALPINGOOPHORECTOMY Right 2019    Procedure: RIGHT SALPINGO-OOPHORECTOMY USO;  Surgeon: Mary Iglesias MD;  [facility];  Service: OB/GYN;  Laterality: Right;    TONSILLECTOMY         PTA Medications   Medication Sig    aspirin (ECOTRIN) 81 MG EC tablet Take 81 mg by mouth once daily.    CLASSIC PRENATAL 28 mg iron- 800 mcg Tab Take 1 tablet by mouth once daily.    insulin glargine (LANTUS U-100 INSULIN) 100 unit/mL injection Inject  "34 Units into the skin every evening.    levothyroxine (SYNTHROID) 25 MCG tablet Take 25 mcg by mouth once daily.    metFORMIN (GLUCOPHAGE) 1000 MG tablet Take 1 tablet (1,000 mg total) by mouth 2 (two) times daily with meals.    BD INSULIN SYRINGE ULTRA-FINE 0.5 mL 31 gauge x 5/16" Syrg Inject 1 Syringe into the skin once daily.    blood sugar diagnostic (RELION PRIME TEST STRIPS) Strp 1 strip by Misc.(Non-Drug; Combo Route) route 4 (four) times daily.    citalopram (CELEXA) 10 MG tablet citalopram 10 mg tablet   Take 1 tablet(s) every day by oral route.    insulin syringe-needle U-100 (BD INSULIN SYRINGE ULTRA-FINE) 0.3 mL 31 gauge x 5/16" Syrg Use in the evening as directed.    OPW TEST CLAIM - DO NOT FILL Inject into the skin. OPW test claim. Do not fill.    OPW TEST CLAIM - DO NOT FILL Inject into the skin. OPW test claim. Do not fill.    pen needle, diabetic 29 gauge x 1/2" Ndle Use four times daily with insulin pens    TRUE METRIX GLUCOSE TEST STRIP Strp     TRUEPLUS LANCETS 28 [place] [facility]        Review of patient's allergies indicates:  No Known Allergies     Family History     Problem Relation (Age of Onset)    Breast cancer Maternal Grandmother    COPD Mother    Diabetes Father        Tobacco Use    Smoking status: Current Every Day Smoker     Packs/day: 0.50     Years: 15.00     Pack years: 7.50     Types: Cigarettes    Smokeless tobacco: Never Used   Substance and Sexual Activity    Alcohol use: Not Currently    Drug use: Yes     Types: Methamphetamines, Marijuana     Comment: last use over a year    Sexual activity: Yes     Partners: Male     Birth control/protection: None     Comment:      Review of Systems   Constitutional: Negative for activity change, appetite change, chills, diaphoresis, fatigue and fever.   Eyes: Negative for visual disturbance.   Respiratory: Negative for cough, shortness of breath and wheezing.    Cardiovascular: Negative for chest pain and palpitations. "   Gastrointestinal: Negative for abdominal pain, constipation, diarrhea, nausea and vomiting.   Genitourinary: Negative for dysuria, genital sores, pelvic pain, urgency, vaginal bleeding, vaginal discharge, vaginal pain and vaginal odor.   Musculoskeletal: Negative for back pain, joint swelling and myalgias.   Integumentary:  Negative for rash.   Neurological: Negative for seizures, syncope, numbness and headaches.   Hematological: Negative for adenopathy. Does not bruise/bleed easily.   Psychiatric/Behavioral: Negative for depression. The patient is not nervous/anxious.       Objective:     Vital Signs (Most Recent):  Temp: 98.8 °F (37.1 °C) (20)  Pulse: 95 (20)  Resp: 20 (20)  BP: (Abnormal) 141/83 (20)  SpO2: 96 % (20) Vital Signs (24h Range):  Temp:  [98.8 °F (37.1 °C)] 98.8 °F (37.1 °C)  Pulse:  [] 95  Resp:  [20] 20  SpO2:  [96 %] 96 %  BP: (135-160)/() 141/83     Weight: 84.4 kg (186 lb)  Body mass index is 31.93 kg/m².    FHT: 150 Cat 1 (reassuring)  TOCO:  no contractions    Physical Exam:   Constitutional: She is oriented to person, place, and time. She appears well-developed and well-nourished. No distress.    HENT:   Head: Normocephalic and atraumatic.    Eyes: Conjunctivae and EOM are normal.    Neck: Normal range of motion. Neck supple.     Pulmonary/Chest: Effort normal.                  Musculoskeletal: Normal range of motion.       Neurological: She is alert and oriented to person, place, and time.    Skin: Skin is warm and dry.    Psychiatric: She has a normal mood and affect. Her behavior is normal. Judgment and thought content normal.         Significant Labs:  Lab Results   Component Value Date    [facility] B POS 2020       I have personallly reviewed all pertinent lab results from the last 24 hours.    Assessment/Plan:     34 y.o. female  at 38w0d for:    Request for sterilization  BTL    Pre-existing diabetes  mellitus affecting pregnancy in third trimester, antepartum  Pt has been on Metformin and Insulin, poorly controlled.  Will plan for ISS pp.    History of  delivery affecting pregnancy  Repeat   Patient cleared for Anesthesia: Epidural    Anesthesia/Surgery risks, benefits, and alternative options discussed and understood by patient/fa          Onelia Crespo MD  Obstetrics  [facility]

## 2020-02-14 NOTE — PROGRESS NOTES
1712- BP elevated 165/98 will repeat in 15 minutes. Pt vomiting, complains of headache.   17-27- 167/96  1735- MD notified, will start magnesium.

## 2020-02-14 NOTE — ANESTHESIA PROCEDURE NOTES
Spinal    Diagnosis:  Section  Patient location during procedure: OR  Start time: 2020 7:21 AM  Timeout: 2020 7:20 AM  End time: 2020 7:30 AM    Staffing  Authorizing Provider: Genaro Reynolds CRNA  Performing Provider: Genaro Reynolds CRNA    Preanesthetic Checklist  Completed: patient identified, site marked, surgical consent, pre-op evaluation, timeout performed, IV checked, risks and benefits discussed and monitors and equipment checked  Spinal Block  Patient position: sitting  Prep: ChloraPrep  Patient monitoring: heart rate and continuous pulse ox  Approach: midline  Location: L3-4  Injection technique: single shot  CSF Fluid: clear free-flowing CSF  Needle  Needle type: pencil-tip   Needle gauge: 25 G  Needle length: 3.5 in  Additional Documentation: incremental injection, negative aspiration for heme and no paresthesia on injection  Needle localization: anatomical landmarks  Assessment  Sensory level: T4   Dermatomal levels determined by alcohol wipe and pinch or prick  Ease of block: easy  Patient's tolerance of the procedure: comfortable throughout block and no complaints

## 2020-02-14 NOTE — NURSING TRANSFER
Nursing Transfer Note      2/14/2020     Transfer To: [facility]    Transfer via bed    Transfer with baby in arms, belongings and family member.    Transported by Alyson Noel RN and Jenna Coates RN    Chart send with patient:yes    Report given to Alyson Noel RN.

## 2020-02-15 LAB
BASOPHILS # BLD AUTO: 0.01 K/UL (ref 0–0.2)
BASOPHILS NFR BLD: 0.1 % (ref 0–1.9)
DIFFERENTIAL METHOD: ABNORMAL
EOSINOPHIL # BLD AUTO: 0.1 K/UL (ref 0–0.5)
EOSINOPHIL NFR BLD: 1 % (ref 0–8)
ERYTHROCYTE [DISTWIDTH] IN BLOOD BY AUTOMATED COUNT: 15.4 % (ref 11.5–14.5)
HCT VFR BLD AUTO: 37.7 % (ref 37–48.5)
HGB BLD-MCNC: 12.6 G/DL (ref 12–16)
IMM GRANULOCYTES # BLD AUTO: 0.06 K/UL (ref 0–0.04)
IMM GRANULOCYTES NFR BLD AUTO: 0.4 % (ref 0–0.5)
LYMPHOCYTES # BLD AUTO: 1.8 K/UL (ref 1–4.8)
LYMPHOCYTES NFR BLD: 13.1 % (ref 18–48)
MCH RBC QN AUTO: 29.4 PG (ref 27–31)
MCHC RBC AUTO-ENTMCNC: 33.4 G/DL (ref 32–36)
MCV RBC AUTO: 88 FL (ref 82–98)
MONOCYTES # BLD AUTO: 0.7 K/UL (ref 0.3–1)
MONOCYTES NFR BLD: 5.5 % (ref 4–15)
NEUTROPHILS # BLD AUTO: 10.8 K/UL (ref 1.8–7.7)
NEUTROPHILS NFR BLD: 79.9 % (ref 38–73)
NRBC BLD-RTO: 0 /100 WBC
PLATELET # BLD AUTO: 142 K/UL (ref 150–350)
PMV BLD AUTO: 13.3 FL (ref 9.2–12.9)
POCT GLUCOSE: 129 MG/DL (ref 70–110)
POCT GLUCOSE: 146 MG/DL (ref 70–110)
POCT GLUCOSE: 187 MG/DL (ref 70–110)
POCT GLUCOSE: 223 MG/DL (ref 70–110)
RBC # BLD AUTO: 4.29 M/UL (ref 4–5.4)
WBC # BLD AUTO: 13.47 K/UL (ref 3.9–12.7)

## 2020-02-15 PROCEDURE — 63600175 PHARM REV CODE 636 W HCPCS: Performed by: ADVANCED PRACTICE MIDWIFE

## 2020-02-15 PROCEDURE — 25000003 PHARM REV CODE 250: Performed by: OBSTETRICS & GYNECOLOGY

## 2020-02-15 PROCEDURE — C9399 UNCLASSIFIED DRUGS OR BIOLOG: HCPCS | Performed by: OBSTETRICS & GYNECOLOGY

## 2020-02-15 PROCEDURE — 36415 COLL VENOUS BLD VENIPUNCTURE: CPT

## 2020-02-15 PROCEDURE — 99231 SBSQ HOSP IP/OBS SF/LOW 25: CPT | Mod: ,,, | Performed by: ADVANCED PRACTICE MIDWIFE

## 2020-02-15 PROCEDURE — 25000003 PHARM REV CODE 250: Performed by: ADVANCED PRACTICE MIDWIFE

## 2020-02-15 PROCEDURE — 11000001 HC ACUTE MED/SURG PRIVATE ROOM

## 2020-02-15 PROCEDURE — 99231 PR SUBSEQUENT HOSPITAL CARE,LEVL I: ICD-10-PCS | Mod: ,,, | Performed by: ADVANCED PRACTICE MIDWIFE

## 2020-02-15 PROCEDURE — 63600175 PHARM REV CODE 636 W HCPCS: Performed by: OBSTETRICS & GYNECOLOGY

## 2020-02-15 PROCEDURE — 85025 COMPLETE CBC W/AUTO DIFF WBC: CPT

## 2020-02-15 RX ORDER — LABETALOL HYDROCHLORIDE 5 MG/ML
20 INJECTION, SOLUTION INTRAVENOUS ONCE AS NEEDED
Status: COMPLETED | OUTPATIENT
Start: 2020-02-15 | End: 2020-02-15

## 2020-02-15 RX ORDER — NIFEDIPINE 30 MG/1
30 TABLET, EXTENDED RELEASE ORAL DAILY
Status: DISCONTINUED | OUTPATIENT
Start: 2020-02-15 | End: 2020-02-16 | Stop reason: HOSPADM

## 2020-02-15 RX ADMIN — OXYCODONE HYDROCHLORIDE AND ACETAMINOPHEN 1 TABLET: 5; 325 TABLET ORAL at 05:02

## 2020-02-15 RX ADMIN — KETOROLAC TROMETHAMINE 30 MG: 30 INJECTION, SOLUTION INTRAMUSCULAR at 07:02

## 2020-02-15 RX ADMIN — IBUPROFEN 800 MG: 800 TABLET ORAL at 01:02

## 2020-02-15 RX ADMIN — INSULIN ASPART 4 UNITS: 100 INJECTION, SOLUTION INTRAVENOUS; SUBCUTANEOUS at 08:02

## 2020-02-15 RX ADMIN — OXYCODONE HYDROCHLORIDE AND ACETAMINOPHEN 1 TABLET: 10; 325 TABLET ORAL at 12:02

## 2020-02-15 RX ADMIN — PRENATAL VITAMINS-IRON FUMARATE 27 MG IRON-FOLIC ACID 0.8 MG TABLET 1 TABLET: at 08:02

## 2020-02-15 RX ADMIN — MUPIROCIN: 20 OINTMENT TOPICAL at 08:02

## 2020-02-15 RX ADMIN — NIFEDIPINE 30 MG: 30 TABLET, FILM COATED, EXTENDED RELEASE ORAL at 06:02

## 2020-02-15 RX ADMIN — MUPIROCIN: 20 OINTMENT TOPICAL at 09:02

## 2020-02-15 RX ADMIN — CITALOPRAM HYDROBROMIDE 10 MG: 10 TABLET ORAL at 08:02

## 2020-02-15 RX ADMIN — INSULIN ASPART 2 UNITS: 100 INJECTION, SOLUTION INTRAVENOUS; SUBCUTANEOUS at 12:02

## 2020-02-15 RX ADMIN — OXYCODONE HYDROCHLORIDE AND ACETAMINOPHEN 1 TABLET: 5; 325 TABLET ORAL at 04:02

## 2020-02-15 RX ADMIN — OXYCODONE HYDROCHLORIDE AND ACETAMINOPHEN 1 TABLET: 5; 325 TABLET ORAL at 01:02

## 2020-02-15 RX ADMIN — LEVOTHYROXINE SODIUM 25 MCG: 25 TABLET ORAL at 08:02

## 2020-02-15 RX ADMIN — DOCUSATE SODIUM 200 MG: 100 CAPSULE, LIQUID FILLED ORAL at 09:02

## 2020-02-15 RX ADMIN — IBUPROFEN 800 MG: 800 TABLET ORAL at 09:02

## 2020-02-15 RX ADMIN — INSULIN DETEMIR 16 UNITS: 100 INJECTION, SOLUTION SUBCUTANEOUS at 09:02

## 2020-02-15 RX ADMIN — LABETALOL HYDROCHLORIDE 20 MG: 5 INJECTION, SOLUTION INTRAVENOUS at 05:02

## 2020-02-15 RX ADMIN — DOCUSATE SODIUM 200 MG: 100 CAPSULE, LIQUID FILLED ORAL at 08:02

## 2020-02-15 RX ADMIN — MAGNESIUM SULFATE HEPTAHYDRATE 2 G/HR: 40 INJECTION, SOLUTION INTRAVENOUS at 01:02

## 2020-02-15 RX ADMIN — SODIUM CHLORIDE, SODIUM LACTATE, POTASSIUM CHLORIDE, AND CALCIUM CHLORIDE: .6; .31; .03; .02 INJECTION, SOLUTION INTRAVENOUS at 08:02

## 2020-02-15 NOTE — PROGRESS NOTES
Merged with [facility]  Obstetrics  Postpartum Progress Note    Patient Name: Kiana Hardy  MRN: [id number]  Admission Date: 2020  Hospital Length of Stay: 1 days  Attending Physician: Onelia Crespo MD  Primary Care Provider: Grady Billings MD    Subjective:     Principal Problem:Status post repeat low transverse  section    Hospital course: Pt admitted for scheduled repeat  and BTL. Mag prophylaxis and PRN Labetalol   pod2 Cont Mag x 24 hrs then dc PRN Labetalol, Procardia 30 mg po daily after Mag dc     Interval History:     She is doing well this morning. She is tolerating a regular diet without nausea or vomiting. She is voiding spontaneously. She is ambulating. She has passed flatus, and has a BM. Vaginal bleeding is mild. She denies fever or chills. Abdominal pain is mild and controlled with oral medications. She is not breastfeeding.    Objective:     Vital Signs (Most Recent):  Temp: 97 °F (36.1 °C) (02/15/20 0950)  Pulse: 102 (02/15/20 1054)  Resp: 17 (02/15/20 0950)  BP: 138/84 (02/15/20 1054)  SpO2: 95 % (02/15/20 0950) Vital Signs (24h Range):  Temp:  [96.7 °F (35.9 °C)-98.2 °F (36.8 °C)] 97 °F (36.1 °C)  Pulse:  [] 102  Resp:  [17-20] 17  SpO2:  [95 %-99 %] 95 %  BP: (124-181)/() 138/84     Weight: 84.4 kg (186 lb)  Body mass index is 31.93 kg/m².      Intake/Output Summary (Last 24 hours) at 2/15/2020 1120  Last data filed at 2/15/2020 0930  Gross per 24 hour   Intake 1626.67 ml   Output 5250 ml   Net -3623.33 ml       Significant Labs:  Lab Results   Component Value Date    GROUPTRH B POS 2020     Recent Labs   Lab 02/15/20  0637   HGB 12.6   HCT 37.7       I have personallly reviewed all pertinent lab results from the last 24 hours.    Physical Exam:   Constitutional: She is oriented to person, place, and time. She appears well-developed and well-nourished.    HENT:   Head: Normocephalic.    Eyes: Pupils are equal, round, and reactive to  light.    Neck: Normal range of motion.    Cardiovascular: Normal rate.     Pulmonary/Chest: Effort normal.        Abdominal: Soft. She exhibits abdominal incision.   C/d/i      Genitourinary: Vagina normal and uterus normal.           Musculoskeletal: Normal range of motion.       Neurological: She is alert and oriented to person, place, and time. She has normal reflexes.    Skin: Skin is warm and dry.    Psychiatric: She has a normal mood and affect. Her behavior is normal. Judgment and thought content normal.       Assessment/Plan:     34 y.o. female  for:    * Status post repeat low transverse  section  Repeat   Patient cleared for Anesthesia: Epidural    Anesthesia/Surgery risks, benefits, and alternative options discussed and understood by patient/fa      Request for sterilization  BTL    Pre-existing diabetes mellitus affecting pregnancy in third trimester, antepartum  Pt has been on Metformin and Insulin, poorly controlled.  Will plan for ISS pp.        Disposition: As patient meets milestones, will plan to discharge tomorrow pending BP's.    Rosetta Glover CNM  Obstetrics  [place] - [facility]

## 2020-02-15 NOTE — NURSING
Cloud discontinued; suzette pads changed; scant vaginal bleeding noted. Patient ambulated in room with just stand by assistance. Reminded pt urine needs to be measured and to call for needs; states understanding.

## 2020-02-15 NOTE — PLAN OF CARE
Pt had repeat c section this morning. Mom poorly controlled diabetic. Glucoses being checked AC HS have been WNL. Mother pain controlled with prn meds. IV pain meds given today and pt became nauseous. IV antiemetics given. Pt now HTN and complaining of headaches. MD notified and mag started pt sleepy but arousable. Cloud to gravity for acurate urine output and cardiac monitor to pt chest. No rescue BP meds needed to be given at current. Calcium gluconate readily available. No questions or concerns at present.

## 2020-02-15 NOTE — SUBJECTIVE & OBJECTIVE
Hospital course: Pt admitted for scheduled repeat  and BTL. Mag prophylaxis and PRN Labetalol   pod2 Cont Mag x 24 hrs then dc PRN Labetalol, Procardia 30 mg po daily after Mag dc     Interval History:     She is doing well this morning. She is tolerating a regular diet without nausea or vomiting. She is voiding spontaneously. She is ambulating. She has passed flatus, and has a BM. Vaginal bleeding is mild. She denies fever or chills. Abdominal pain is mild and controlled with oral medications. She is not breastfeeding.    Objective:     Vital Signs (Most Recent):  Temp: 97 °F (36.1 °C) (02/15/20 0950)  Pulse: 102 (02/15/20 1054)  Resp: 17 (02/15/20 0950)  BP: 138/84 (02/15/20 1054)  SpO2: 95 % (02/15/20 0950) Vital Signs (24h Range):  Temp:  [96.7 °F (35.9 °C)-98.2 °F (36.8 °C)] 97 °F (36.1 °C)  Pulse:  [] 102  Resp:  [17-20] 17  SpO2:  [95 %-99 %] 95 %  BP: (124-181)/() 138/84     Weight: 84.4 kg (186 lb)  Body mass index is 31.93 kg/m².      Intake/Output Summary (Last 24 hours) at 2/15/2020 1120  Last data filed at 2/15/2020 0930  Gross per 24 hour   Intake 1626.67 ml   Output 5250 ml   Net -3623.33 ml       Significant Labs:  Lab Results   Component Value Date    GROUPTRH B POS 2020     Recent Labs   Lab 02/15/20  0637   HGB 12.6   HCT 37.7       I have personallly reviewed all pertinent lab results from the last 24 hours.    Physical Exam:   Constitutional: She is oriented to person, place, and time. She appears well-developed and well-nourished.    HENT:   Head: Normocephalic.    Eyes: Pupils are equal, round, and reactive to light.    Neck: Normal range of motion.    Cardiovascular: Normal rate.     Pulmonary/Chest: Effort normal.        Abdominal: Soft. She exhibits abdominal incision.   C/d/i      Genitourinary: Vagina normal and uterus normal.           Musculoskeletal: Normal range of motion.       Neurological: She is alert and oriented to person, place, and time. She has  normal reflexes.    Skin: Skin is warm and dry.    Psychiatric: She has a normal mood and affect. Her behavior is normal. Judgment and thought content normal.

## 2020-02-15 NOTE — PLAN OF CARE
BP elevated, mag continued and one dose of labetalol IV given, stable for remainder of shift; other vitals stable. Complained of cramping and incisional pain; moderately relieved with toradol and percocet. Cloud; adequate output. Fall precautions maintained; up ad steve. Fundus firm, below umbilicus; scant bleeding. Bonding well with baby. Formula feeding. Plan of care reviewed; voiced understanding.

## 2020-02-16 VITALS
RESPIRATION RATE: 16 BRPM | TEMPERATURE: 98 F | WEIGHT: 186 LBS | BODY MASS INDEX: 31.76 KG/M2 | OXYGEN SATURATION: 96 % | HEART RATE: 105 BPM | SYSTOLIC BLOOD PRESSURE: 145 MMHG | DIASTOLIC BLOOD PRESSURE: 84 MMHG | HEIGHT: 64 IN

## 2020-02-16 LAB
POCT GLUCOSE: 139 MG/DL (ref 70–110)
POCT GLUCOSE: 72 MG/DL (ref 70–110)

## 2020-02-16 PROCEDURE — 25000003 PHARM REV CODE 250: Performed by: OBSTETRICS & GYNECOLOGY

## 2020-02-16 PROCEDURE — 99238 HOSP IP/OBS DSCHRG MGMT 30/<: CPT | Mod: ,,, | Performed by: ADVANCED PRACTICE MIDWIFE

## 2020-02-16 PROCEDURE — 25000003 PHARM REV CODE 250: Performed by: ADVANCED PRACTICE MIDWIFE

## 2020-02-16 PROCEDURE — 99238 PR HOSPITAL DISCHARGE DAY,<30 MIN: ICD-10-PCS | Mod: ,,, | Performed by: ADVANCED PRACTICE MIDWIFE

## 2020-02-16 RX ORDER — NIFEDIPINE 30 MG/1
30 TABLET, EXTENDED RELEASE ORAL DAILY
Qty: 30 TABLET | Refills: 4 | Status: SHIPPED | OUTPATIENT
Start: 2020-02-17 | End: 2020-02-21 | Stop reason: DRUGHIGH

## 2020-02-16 RX ORDER — LEVOTHYROXINE SODIUM 25 UG/1
25 TABLET ORAL
Status: DISCONTINUED | OUTPATIENT
Start: 2020-02-16 | End: 2020-02-16 | Stop reason: HOSPADM

## 2020-02-16 RX ORDER — IBUPROFEN 600 MG/1
600 TABLET ORAL 3 TIMES DAILY
Qty: 30 TABLET | Refills: 3 | Status: SHIPPED | OUTPATIENT
Start: 2020-02-16 | End: 2021-05-18

## 2020-02-16 RX ORDER — OXYCODONE AND ACETAMINOPHEN 5; 325 MG/1; MG/1
1 TABLET ORAL EVERY 4 HOURS PRN
Qty: 15 EACH | Refills: 0 | Status: SHIPPED | OUTPATIENT
Start: 2020-02-16 | End: 2021-05-18

## 2020-02-16 RX ADMIN — IBUPROFEN 800 MG: 800 TABLET ORAL at 05:02

## 2020-02-16 RX ADMIN — CITALOPRAM HYDROBROMIDE 10 MG: 10 TABLET ORAL at 08:02

## 2020-02-16 RX ADMIN — PRENATAL VITAMINS-IRON FUMARATE 27 MG IRON-FOLIC ACID 0.8 MG TABLET 1 TABLET: at 08:02

## 2020-02-16 RX ADMIN — NIFEDIPINE 30 MG: 30 TABLET, FILM COATED, EXTENDED RELEASE ORAL at 08:02

## 2020-02-16 RX ADMIN — OXYCODONE HYDROCHLORIDE AND ACETAMINOPHEN 1 TABLET: 5; 325 TABLET ORAL at 10:02

## 2020-02-16 RX ADMIN — LEVOTHYROXINE SODIUM 25 MCG: 25 TABLET ORAL at 05:02

## 2020-02-16 RX ADMIN — OXYCODONE HYDROCHLORIDE AND ACETAMINOPHEN 1 TABLET: 5; 325 TABLET ORAL at 12:02

## 2020-02-16 NOTE — PLAN OF CARE
Pt stable, VS WNL.  Self suzette-care.  Fundus firm, midline 2 below.  Light bleeding.  Abdominal dressing clean, dry, intact.  Abdominal binder in place.  Tolerating regular diet.  Ambulates around room well, independently.  Father at bedside, attentive to pt and baby.  Pt attentive to baby's feeding cues.  Utilizing feeding log.  Pt c/o pain, addressed with ordered pain meds.  Pt doing well.  Denies any  needs at this time.

## 2020-02-16 NOTE — DISCHARGE INSTRUCTIONS
Postpartum Discharge Instructions:    · No heavy lifting, straining, frequent rest periods  · Pelvic rest--no douching, tampons, or intercourse until released by MD  · Talk to your doctor about birth control--remember breastfeeding is not a method of birth control  · Notify MD if bleeding becomes heavier than usual and if large clots, painful cramping,or foul odor develops.   Vaginal discharge will lighten and decrease in amount gradually.    · Cleanse perineal area from front to back after urination or having a bowel movement.  · Tub soak or portable sitz bath at home.  Apply clean pad with Epifoam and Tucks to perineal area.  · Episiotomy stitches will dissolve within 2-3 weeks  · If not breastfeeding, wear tight fitting sports bra for 1 week--remove only to bathe  · Remember to keep your breast clean and dry to prevent any cracking  · Notify MD if breast become reddened,swollen, nipples bleed or crack, or fever greater than 100.4  · Notify MD of pain, swelling,  Redness, or heat developing in back of leg especially when foot is flexed toward body  · Look at incision everyday for redness, swelling, or drainage which may indicate infection  · Notify MD of pain not relieved by pain medication.  · Call MD or go to ER for any concerns            Controlling High Blood Pressure  High blood pressure (hypertension) is often called the silent killer. This is because many people who have it dont know it. High blood pressure is defined as 140/90 mm Hg or higher. Know your blood pressure and remember to check it regularly. Doing so can save your life. Here are some things you can do to help control your blood pressure.    Choose heart-healthy foods  · Select low-salt, low-fat foods. Limit sodium intake to 2,400 mg per day or the amount suggested by your healthcare provider.  · Limit canned, dried, cured, packaged, and fast foods. These can contain a lot of salt.  · Eat 8 to 10 servings of fruits and vegetables every  day.  · Choose lean meats, fish, or chicken.  · Eat whole-grain pasta, brown rice, and beans.  · Eat 2 to 3 servings of low-fat or fat-free dairy products.  · Ask your doctor about the DASH eating plan. This plan helps reduce blood pressure.  · When you go to a restaurant, ask that your meal be prepared with no added salt.  Maintain a healthy weight  · Ask your healthcare provider how many calories to eat a day. Then stick to that number.  · Ask your healthcare provider what weight range is healthiest for you. If you are overweight, a weight loss of only 3% to 5% of your body weight can help lower blood pressure. Generally, a good weight loss goal is to lose 10% of your body weight in a year.  · Limit snacks and sweets.  · Get regular exercise.  Get up and get active  · Choose activities you enjoy. Find ones you can do with friends or family. This includes bicycling, dancing, walking, and jogging.  · Park farther away from building entrances.  · Use stairs instead of the elevator.  · When you can, walk or bike instead of driving.  · Amalia leaves, garden, or do household repairs.  · Be active at a moderate to vigorous level of physical activity for at least 40 minutes for a minimum of 3 to 4 days a week.   Manage stress  · Make time to relax and enjoy life. Find time to laugh.  · Communicate your concerns with your loved ones and your healthcare provider.  · Visit with family and friends, and keep up with hobbies.  Limit alcohol and quit smoking  · Men should have no more than 2 drinks per day.  · Women should have no more than 1 drink per day.  · Talk with your healthcare provider about quitting smoking. Smoking significantly increases your risk for heart disease and stroke. Ask your healthcare provider about community smoking cessation programs and other options.  Medicines  If lifestyle changes arent enough, your healthcare provider may prescribe high blood pressure medicine. Take all medicines as prescribed. If  you have any questions about your medicines, ask your healthcare provider before stopping or changing them.   Date Last Reviewed: 4/27/2016  © 4509-5544 [employer]. [address] [place] [address], [place] [address]. All rights reserved. This information is not intended as a substitute for professional medical care. Always follow your healthcare professional's instructions.          Postpartum Discharge Instructions:    · No heavy lifting, straining, frequent rest periods  · Pelvic rest--no douching, tampons, or intercourse until released by MD  · Notify MD if bleeding becomes heavier than usual and if large clots, painful cramping,or foul odor develops.   Vaginal discharge will lighten and decrease in amount gradually.    · Cleanse perineal area from front to back after urination or having a bowel movement.  · If not breastfeeding, wear tight fitting sports bra for 1 week--remove only to bathe  · Remember to keep your breast clean and dry to prevent any cracking  · Notify MD if breast become reddened,swollen, nipples bleed or crack, or fever greater than 100.4  · Notify MD of pain, swelling,  Redness, or heat developing in back of leg especially when foot is flexed toward body  · Look at incision everyday for redness, swelling, or drainage which may indicate infection  · Notify MD of pain not relieved by pain medication.  · Call MD or go to ER for any concerns                            Management of Engorgement in the Non-Nursing Mother    Your breastmilk will usually come in within 3-5 days after delivery even if you have decided not to breastfeed.  Your breasts may become full, lumpy, hard and uncomfortable due to the glands filling with milk and swelling of the breast tissue, blood vessels, and lymph glands.  This is a temporary condition usually lasting 24-48 hrs.  If your breasts become uncomfortable during this time, you may use some or all of the comfort measures described below to obtain relief.       Measures to relieve discomfort:    1. Wear a well fitting supportive bra. It should not be too tight or it may lead to plugged ducts or a breast infection.  (We do not recommend that you bind your breasts with any type of wrap.)    2. If the breasts begin to feel heavy, warm or uncomfortably full, begin using cold compresses on your breasts. Cold compresses can relieve the swelling and provide comfort.  Cold application can be in the form of ice packs, gel packs, frozen bags of vegetables or frozen wet towels. Cold compresses should be  wrapped in a thin towel or a pillow case and applied to the breasts  for 20 minutes at a time. This process can be repeated with a short break in between the applications of cold compresses until the swelling is relieved.     3.  Cold cabbage compresses can also be used to relieve pain and swelling.  Chilled cabbage leaves show similar pain reducing effects as cold compresses.  Some mothers prefer the cabbage leaves due to a stronger, more immediate effect. Cabbage leave effects are not clinically proven but many mothers find them very soothing.    How to apply chilled cabbage compresses:  rinse with cool water and refrigerate raw cabbage leaves.  Strip the large vein off the cold cabbage leaf and put the remaining part of the cabbage leaf directly on the breast. The leaves should be worn inside the bra until they wilt, usually within 2-4 hours.  When they wilt they should be replaced with fresh leaves.   If redness,  rash, or itching occur stop use immediately.    4. Anti-inflammatory medications such as ibuprophen may be taken, with your physicians approval, to reduce inflammation and discomfort.     6. If fullness persists and remains painful even after all of the above measures are used, hand expressing a small amount of milk out of the breasts can provide an extra measure of comfort.  Warm showers, letting the warm water hit your back and roll over your shoulders to the  breasts, while you gently express milk can make hand expressing a little easier. You should only remove enough milk to provide comfort and relief.   Repeat this process as often as needed to keep the breasts comfortable.    7. You can pump your breasts and remove just enough milk to feel softer, but not so much that more milk production is stimulated.   Repeat this process as often as needed to keep the breasts comfortable.    8. There is no need to limit the amount of fluids that you drink.  9. Engorgement will usually get better within 24-48 hrs; however, there may be some fullness and/or leaking of milk for several days. Wear breast pads to absorb any leaking milk and change them frequently.    10. If you have any flu-like symptoms such as fever, chills, feeling tired and achy or red areas on your breast, call your physician immediately.    11.Call use at [phone] if the engorgement is not relieved or if you have questions.        What is High Blood Pressure?  High blood pressure (also called hypertension) is known as the silent killer. This is because most of the time it doesnt cause symptoms. In fact, many people dont know they have it until other problems develop. In most cases, high blood pressure cant be cured. Its a disease that often requires lifelong treatment. The good news is that it can be managed.  Understanding blood pressure  The circulatory system is made up of the heart and blood vessels that carry blood through the body. Your heart is the pump for this system. With each heartbeat (contraction), the heart sends blood out through large blood vessels called arteries. Blood pressure is a measure of how hard the moving blood pushes against the walls of the arteries.  High blood pressure can harm your health  In a healthy blood vessel, the blood moves smoothly through the vessel and puts normal pressure on the vessel walls.       High blood pressure occurs when blood pushes too hard against  "artery walls. This causes damage to the artery walls and then the formation of scar tissue as it heals. This makes the arteries stiff and weak. Plaque sticks to the scarred tissue narrowing and hardening the arteries. High blood pressure also causes your heart to work harder to get blood out to the body. High blood pressure raises your risk of heart attack, also known as acute myocardial infarction, or AMI, and stroke. It can also lead to kidney disease, and blindness.  Measuring blood pressure  An example of a blood pressure measurement is 120/70 (120 over 70). The top number is the pressure of blood against the artery walls during a heartbeat (systolic). The bottom number is the pressure of blood against artery walls between heartbeats (diastolic). Talk with your healthcare provider to find out what your blood pressure goals should be.   Controlling blood pressure  If your blood pressure is too high, work with your doctor on a plan for lowering it. Below are steps you can take that will help lower your blood pressure.  · Choose heart-healthy foods. Eating healthier meals helps you control your blood pressure. Ask your doctor about the DASH eating plan. This plan helps reduce blood pressure by limiting the amount of sodium (salt) you have in your diet. DASH also encourages eating plenty of fruits and vegetables, low-fat or non-fat dairy, whole-grains, and foods high in fiber, and low in fat.  · Reduce sodium. Reducing sodium in your diet reduces fluid retention. Fluid retention caused by too much salt increases blood volume and blood pressure. The American Heart Associations (AHA) "ideal" sodium intake recommendation is 1,500 milligrams per day.  However, since American's eat so much salt, the AHA says a positive change can occur by cutting back to even 2,400 milligrams of sodium a day.  · Maintain a healthy weight. Being overweight makes you more likely to have high blood pressure. Losing excess weight helps " lower blood pressure.  · Exercise regularly. Daily exercise helps your heart and blood vessels work better and stay healthier. It can help lower your blood pressure.  · Stop smoking. Smoking increases blood pressure and damages blood vessels.  · Limit alcohol. Drinking too much alcohol can raise blood pressure. Men should have no more than 2 drinks a day. Women should have no more than 1. (A drink is equal to 1 beer, or a small glass of wine, or a shot of liquor.)  · Control stress. Stress makes your heart work harder and beat faster. Controlling stress helps you control your blood pressure.  Facts about high blood pressure  · Feeling OK does not mean that blood pressure is under control. Likewise, feeling bad doesnt mean its out of control. The only way to know for sure is to check your pressure regularly.  · Medicine is only one part of controlling high blood pressure. You also need to manage your weight, get regular exercise, and adjust your eating habits.  · High blood pressure is usually a lifelong problem. But it can be controlled with healthy lifestyle changes and medicine.  · Hypertension is not the same as stress. Although stress may be a factor in high blood pressure, its only one part of the story.  · Blood pressure medicines need to be taken every day. Stopping suddenly may cause a dangerous increase in pressure.   Date Last Reviewed: 4/27/2016 © 2000-2017 [employer]. [address] [place] [address], [place] [address]. All rights reserved. This information is not intended as a substitute for professional medical care. Always follow your healthcare professional's instructions.

## 2020-02-16 NOTE — SUBJECTIVE & OBJECTIVE
Hospital course: Pt admitted for scheduled repeat  and BTL. Mag prophylaxis and PRN Labetalol   pod2 Cont Mag x 24 hrs then dc PRN Labetalol, Procardia 30 mg po daily after Mag dc   POD3 DC to home, Procardia rx f/u 1 week with bp check 2 weeks with provider     Interval History:     She is doing well this morning. She is tolerating a regular diet without nausea or vomiting. She is voiding spontaneously. She is ambulating. She has passed flatus, and has not a BM. Vaginal bleeding is mild. She denies fever or chills. Abdominal pain is mild and controlled with oral medications. She is not breastfeeding.    Objective:     Vital Signs (Most Recent):  Temp: 98.1 °F (36.7 °C) (20)  Pulse: 109 (20)  Resp: 16 (20)  BP: (!) 142/89 (20 08)  SpO2: 96 % (20 0134) Vital Signs (24h Range):  Temp:  [97 °F (36.1 °C)-98.1 °F (36.7 °C)] 98.1 °F (36.7 °C)  Pulse:  [] 109  Resp:  [16-18] 16  SpO2:  [95 %-96 %] 96 %  BP: (129-164)/(68-89) 142/89     Weight: 84.4 kg (186 lb)  Body mass index is 31.93 kg/m².      Intake/Output Summary (Last 24 hours) at 2020 0849  Last data filed at 2/15/2020 1536  Gross per 24 hour   Intake --   Output 3750 ml   Net -3750 ml       Significant Labs:  Lab Results   Component Value Date    GROUPTRH B POS 2020     Recent Labs   Lab 02/15/20  0637   HGB 12.6   HCT 37.7       I have personallly reviewed all pertinent lab results from the last 24 hours.    Physical Exam:   Constitutional: She is oriented to person, place, and time. She appears well-developed and well-nourished.    HENT:   Head: Normocephalic.    Eyes: Pupils are equal, round, and reactive to light.    Neck: Normal range of motion.    Cardiovascular: Normal rate and regular rhythm.     Pulmonary/Chest: Effort normal.        Abdominal: Soft. She exhibits abdominal incision.   C/D/I     Genitourinary: Vagina normal and uterus normal.           Musculoskeletal: Normal range of  motion.       Neurological: She is alert and oriented to person, place, and time.    Skin: Skin is warm and dry.    Psychiatric: She has a normal mood and affect. Her behavior is normal. Judgment and thought content normal.

## 2020-02-16 NOTE — DISCHARGE SUMMARY
[facility]  Obstetrics  Discharge Summary      Patient Name: Kiana Hardy  MRN: [id number]  Admission Date: 2020  Hospital Length of Stay: 2 days  Discharge Date and Time:  2020 8:56 AM  Attending Physician: Onelia Crespo MD   Discharging Provider: Rosetta Glover CNM   Primary Care Provider: Grady Billings MD    HPI: Pt is a  @ 38 0/7 WGA who presents for scheduled repeat  and BTL.  Pt's pregnancy complicated by poorly controlled DM2.  She had an ex-lap with RSO while pregnant for large but ultimately benign ovarian mass.         Procedure(s) (LRB):  REPEAT  SECTION, WITH BILATERAL TUBAL LIGATION (N/A)     Hospital Course:   Pt admitted for scheduled repeat  and BTL. Mag prophylaxis and PRN Labetalol   pod2 Cont Mag x 24 hrs then dc PRN Labetalol, Procardia 30 mg po daily after Mag dc   POD3 DC to home, Procardia rx f/u 1 week with bp check 2 weeks with provider          Final Active Diagnoses:    Diagnosis Date Noted POA    PRINCIPAL PROBLEM:  Status post repeat low transverse  section [Z98.891] 10/18/2019 Not Applicable    Pre-existing diabetes mellitus affecting pregnancy in third trimester, antepartum [O24.313] 2020 Yes    Request for sterilization [Z30.2] 2020 Not Applicable    Pre-eclampsia, severe, postpartum condition [O14.15] 2020 Yes    Thyroid disease affecting pregnancy [O99.280, E07.9] 10/18/2019 Yes    S/P ex lap with right salpingo oophorectomy [Z90.721] 2019 Not Applicable      Problems Resolved During this Admission:        Labs: All labs within the past 24 hours have been reviewed    Feeding Method: bottle    Immunizations     None          Delivery:    Episiotomy:     Lacerations:     Repair suture:     Repair # of packets:     Blood loss (ml):       Birth information:  YOB: 2022   Time of birth: 7:53 AM   Sex: female   Delivery type: , Low Transverse    Gestational Age: 38w0d    Delivery Clinician:      Other providers:       Additional  information:  Forceps:    Vacuum:    Breech:    Observed anomalies      Living?:           APGARS  One minute Five minutes Ten minutes   Skin color:         Heart rate:         Grimace:         Muscle tone:         Breathing:         Totals: 8  9        Placenta: Delivered:       appearance    Pending Diagnostic Studies:     Procedure Component Value Units Date/Time    Specimen to Pathology, Surgery Gynecology and Obstetrics [[id number]] Collected:  02/14/20 0804    Order Status:  Sent Lab Status:  In process Updated:  02/14/20 1157          Discharged Condition: good    Disposition: Home or Self Care    Follow Up:    Patient Instructions:   No discharge procedures on file.  Medications:  Current Discharge Medication List      START taking these medications    Details   ibuprofen (ADVIL,MOTRIN) 600 MG tablet Take 1 tablet (600 mg total) by mouth 3 (three) times daily.  Qty: 30 tablet, Refills: 3      NIFEdipine (PROCARDIA-XL) 30 MG (OSM) 24 hr tablet Take 1 tablet (30 mg total) by mouth once daily.  Qty: 30 tablet, Refills: 4      oxyCODONE-acetaminophen (PERCOCET) 5-325 mg per tablet Take 1 tablet by mouth every 4 (four) hours as needed.  Qty: 15 each, Refills: 0    Comments: Quantity prescribed more than 7 day supply? No         CONTINUE these medications which have NOT CHANGED    Details   aspirin (ECOTRIN) 81 MG EC tablet Take 81 mg by mouth once daily.      CLASSIC PRENATAL 28 mg iron- 800 mcg Tab Take 1 tablet by mouth once daily.  Refills: 0      insulin glargine (LANTUS U-100 INSULIN) 100 unit/mL injection Inject 34 Units into the skin every evening.  Qty: 10 mL, Refills: 5      levothyroxine (SYNTHROID) 25 MCG tablet Take 25 mcg by mouth once daily.      metFORMIN (GLUCOPHAGE) 1000 MG tablet Take 1 tablet (1,000 mg total) by mouth 2 (two) times daily with meals.  Qty: 60 tablet, Refills: 11    Associated Diagnoses:  "Diabetes mellitus affecting pregnancy, second trimester      BD INSULIN SYRINGE ULTRA-FINE 0.5 mL 31 gauge x 5/16" Syrg Inject 1 Syringe into the skin once daily.      !! blood sugar diagnostic (RELION PRIME TEST STRIPS) Strp 1 strip by Misc.(Non-Drug; Combo Route) route 4 (four) times daily.  Qty: 200 strip, Refills: 0    Associated Diagnoses: Diabetes mellitus affecting pregnancy in third trimester      citalopram (CELEXA) 10 MG tablet citalopram 10 mg tablet   Take 1 tablet(s) every day by oral route.      insulin syringe-needle U-100 (BD INSULIN SYRINGE ULTRA-FINE) 0.3 mL 31 gauge x 5/16" Syrg Use in the evening as directed.  Qty: 100 each, Refills: 11      !! OPW TEST CLAIM - DO NOT FILL Inject into the skin. OPW test claim. Do not fill.  Qty: 30 mL, Refills: 0      !! OPW TEST CLAIM - DO NOT FILL Inject into the skin. OPW test claim. Do not fill.  Qty: 15 mL, Refills: 0      pen needle, diabetic 29 gauge x 1/2" Ndle Use four times daily with insulin pens  Qty: 100 each, Refills: 11      !! TRUE METRIX GLUCOSE TEST STRIP Strp       TRUE[facility]US LANCETS 28 gauge [facility]        !! - Potential duplicate medications found. Please discuss with provider.      STOP taking these medications       HUMALOG U-100 INSULIN 100 unit/mL injection Comments:   Reason for Stopping:               Rosetta Glover CNM  Obstetrics  [facility]  "

## 2020-02-16 NOTE — PLAN OF CARE
Pt in stable condition. 24 hour mag drip discontinued this evening. One time dose labetalol administered. Will start procardia tonight. Educated pt and family on proper diet for hypertension. Sinus rhythm/ sinus tach on telemetry. Cloud discontinued this morning; pt voiding without issue. LT incision D&I; abdominal binder in use. Fundus midline and firm. Lochia light. Ambulating independently. Monitoring blood sugars AC&HS; covering with SSI as needed. Bonding well with baby; family remain at bedside. Remains free from injury/falls. Reviewed plan of care with pt and family; state agreement.

## 2020-02-16 NOTE — PROGRESS NOTES
Pt meets discharge criteria. Pt is free of pain. Bleeding light, fundus firm. VSS. Pt aware of newly added BP medications and 1 week OB postpartum visit for dressing removal.  Pt wheeled downstairs upon discharge. No needs or questions.

## 2020-02-16 NOTE — PROGRESS NOTES
[place] [facility]  Obstetrics  Postpartum Progress Note    Patient Name: Kiana Hardy  MRN: [id number]  Admission Date: 2020  Hospital Length of Stay: 2 days  Attending Physician: Onelia Crespo MD  Primary Care Provider: Grady Billings MD    Subjective:     Principal Problem:Status post repeat low transverse  section    Hospital course: Pt admitted for scheduled repeat  and BTL. Mag prophylaxis and PRN Labetalol   pod2 Cont Mag x 24 hrs then dc PRN Labetalol, Procardia 30 mg po daily after Mag dc   POD3 DC to home, Procardia rx f/u 1 week with bp check 2 weeks with provider     Interval History:     She is doing well this morning. She is tolerating a regular diet without nausea or vomiting. She is voiding spontaneously. She is ambulating. She has passed flatus, and has not a BM. Vaginal bleeding is mild. She denies fever or chills. Abdominal pain is mild and controlled with oral medications. She is not breastfeeding.    Objective:     Vital Signs (Most Recent):  Temp: 98.1 °F (36.7 °C) (20)  Pulse: 109 (20)  Resp: 16 (20)  BP: (!) 142/89 (20)  SpO2: 96 % (20 0134) Vital Signs (24h Range):  Temp:  [97 °F (36.1 °C)-98.1 °F (36.7 °C)] 98.1 °F (36.7 °C)  Pulse:  [] 109  Resp:  [16-18] 16  SpO2:  [95 %-96 %] 96 %  BP: (129-164)/(68-89) 142/89     Weight: 84.4 kg (186 lb)  Body mass index is 31.93 kg/m².      Intake/Output Summary (Last 24 hours) at 2020 0849  Last data filed at 2/15/2020 1536  Gross per 24 hour   Intake --   Output 3750 ml   Net -3750 ml       Significant Labs:  Lab Results   Component Value Date    GROUPTRH B POS 2020     Recent Labs   Lab 02/15/20  0637   HGB 12.6   HCT 37.7       I have personallly reviewed all pertinent lab results from the last 24 hours.    Physical Exam:   Constitutional: She is oriented to person, place, and time. She appears well-developed and well-nourished.    HENT:    Head: Normocephalic.    Eyes: Pupils are equal, round, and reactive to light.    Neck: Normal range of motion.    Cardiovascular: Normal rate and regular rhythm.     Pulmonary/Chest: Effort normal.        Abdominal: Soft. She exhibits abdominal incision.   C/D/I     Genitourinary: Vagina normal and uterus normal.           Musculoskeletal: Normal range of motion.       Neurological: She is alert and oriented to person, place, and time.    Skin: Skin is warm and dry.    Psychiatric: She has a normal mood and affect. Her behavior is normal. Judgment and thought content normal.       Assessment/Plan:     34 y.o. female  for:    * Status post repeat low transverse  section  Repeat   Patient cleared for Anesthesia: Epidural    Anesthesia/Surgery risks, benefits, and alternative options discussed and understood by patient/fa      Request for sterilization  BTL    Pre-existing diabetes mellitus affecting pregnancy in third trimester, antepartum  Pt has been on Metformin and Insulin, poorly controlled.  Will plan for ISS pp.        Disposition: As patient meets milestones, will plan to discharge TODAY.    Rosetta Glover CNM  Obstetrics  [place] [facility]

## 2020-02-19 ENCOUNTER — PATIENT MESSAGE (OUTPATIENT)
Dept: OBSTETRICS AND GYNECOLOGY | Facility: CLINIC | Age: 35
End: 2020-02-19

## 2020-02-19 LAB
FINAL PATHOLOGIC DIAGNOSIS: NORMAL
GROSS: NORMAL

## 2020-02-21 ENCOUNTER — POSTPARTUM VISIT (OUTPATIENT)
Dept: OBSTETRICS AND GYNECOLOGY | Facility: CLINIC | Age: 35
End: 2020-02-21
Payer: MEDICAID

## 2020-02-21 VITALS
DIASTOLIC BLOOD PRESSURE: 96 MMHG | HEIGHT: 64 IN | WEIGHT: 159.63 LBS | HEART RATE: 105 BPM | BODY MASS INDEX: 27.25 KG/M2 | RESPIRATION RATE: 15 BRPM | SYSTOLIC BLOOD PRESSURE: 170 MMHG

## 2020-02-21 PROCEDURE — 99999 PR PBB SHADOW E&M-EST. PATIENT-LVL IV: ICD-10-PCS | Mod: PBBFAC,,, | Performed by: OBSTETRICS & GYNECOLOGY

## 2020-02-21 PROCEDURE — 59430 PR CARE AFTER DELIVERY ONLY: ICD-10-PCS | Mod: ,,, | Performed by: OBSTETRICS & GYNECOLOGY

## 2020-02-21 PROCEDURE — 99999 PR PBB SHADOW E&M-EST. PATIENT-LVL IV: CPT | Mod: PBBFAC,,, | Performed by: OBSTETRICS & GYNECOLOGY

## 2020-02-21 PROCEDURE — 99214 OFFICE O/P EST MOD 30 MIN: CPT | Mod: PBBFAC,TH | Performed by: OBSTETRICS & GYNECOLOGY

## 2020-02-21 RX ORDER — NIFEDIPINE 60 MG/1
60 TABLET, EXTENDED RELEASE ORAL DAILY
Qty: 30 TABLET | Refills: 11 | Status: SHIPPED | OUTPATIENT
Start: 2020-02-21 | End: 2021-05-18

## 2020-02-21 NOTE — PROGRESS NOTES
CC: Post-partum follow-up    Kiana Hardy is a 34 y.o. female  presents for a postpartum visit.  She is status post  RLTCS 1 weeks ago.  Her hospitalization was complicated by severe Preeclampsia requiring Magnesium sulfate.  She is not breastfeeding.  She had a bilateral tubal ligation for contraception. She has a h/o heavy periods and will desire DepoProvera if periods stay heavy. She denies postpartum depression. She and the baby are doing well.  No pain.  No fever.   No bowel / bladder complaints.       Her last pap was No result found      ROS:  GENERAL: No fever, chills, fatigability.  VULVAR: No pain, no lesions and no itching.  VAGINAL: No relaxation, no itching, no discharge, no abnormal bleeding and no lesions.  ABDOMEN: No abdominal pain. Denies nausea. Denies vomiting. No diarrhea. No constipation  BREAST: Denies pain. No lumps. No discharge.  URINARY: No incontinence, no nocturia, no frequency and no dysuria.  CARDIOVASCULAR: No chest pain. No shortness of breath. No leg cramps.  NEUROLOGICAL: No headaches. No vision changes.    Past Medical History:   Diagnosis Date    Diabetes mellitus     Pregnancy 2019    Thyroid disease     hypothyroidism    Thyroid disease during pregnancy, second trimester 10/18/2019     Past Surgical History:   Procedure Laterality Date     SECTION       SECTION N/A 2020    Procedure: REPEAT  SECTION;  Surgeon: Onelia Crespo MD;  Location: [facility];  Service: OB/GYN;  Laterality: N/A;    CHOLECYSTECTOMY      DILATION AND CURETTAGE OF UTERUS      FOOT SURGERY Right     has a nail in the foot    SALPINGECTOMY Left 2020    Procedure: SALPINGECTOMY;  Surgeon: Onelia Crespo MD;  Location: [facility];  Service: OB/GYN;  Laterality: Left;    SALPINGOOPHORECTOMY Right 2019    Procedure: RIGHT SALPINGO-OOPHORECTOMY USO;  Surgeon: Mary Iglesias MD;  [facility];  Service: OB/GYN;  Laterality:  "Right;    TONSILLECTOMY  1995     Review of patient's allergies indicates:  No Known Allergies    Current Outpatient Medications:     aspirin (ECOTRIN) 81 MG EC tablet, Take 81 mg by mouth once daily., Disp: , Rfl:     CLASSIC PRENATAL 28 mg iron- 800 mcg Tab, Take 1 tablet by mouth once daily., Disp: , Rfl: 0    levothyroxine (SYNTHROID) 25 MCG tablet, Take 25 mcg by mouth once daily., Disp: , Rfl:     metFORMIN (GLUCOPHAGE) 1000 MG tablet, Take 1 tablet (1,000 mg total) by mouth 2 (two) times daily with meals., Disp: 60 tablet, Rfl: 11    oxyCODONE-acetaminophen (PERCOCET) 5-325 mg per tablet, Take 1 tablet by mouth every 4 (four) hours as needed., Disp: 15 each, Rfl: 0    BD INSULIN SYRINGE ULTRA-FINE 0.5 mL 31 gauge x 5/16" Syrg, Inject 1 Syringe into the skin once daily. (Patient not taking: Reported on 2/21/2020), Disp: , Rfl:     blood sugar diagnostic (RELION PRIME TEST STRIPS) Strp, 1 strip by Misc.(Non-Drug; Combo Route) route 4 (four) times daily. (Patient not taking: Reported on 2/21/2020), Disp: 200 strip, Rfl: 0    citalopram (CELEXA) 10 MG tablet, citalopram 10 mg tablet  Take 1 tablet(s) every day by oral route., Disp: , Rfl:     ibuprofen (ADVIL,MOTRIN) 600 MG tablet, Take 1 tablet (600 mg total) by mouth 3 (three) times daily. (Patient not taking: Reported on 2/21/2020), Disp: 30 tablet, Rfl: 3    insulin glargine (LANTUS U-100 INSULIN) 100 unit/mL injection, Inject 34 Units into the skin every evening. (Patient not taking: Reported on 2/21/2020), Disp: 10 mL, Rfl: 5    insulin syringe-needle U-100 (BD INSULIN SYRINGE ULTRA-FINE) 0.3 mL 31 gauge x 5/16" Syrg, Use in the evening as directed. (Patient not taking: Reported on 2/21/2020), Disp: 100 each, Rfl: 11    NIFEdipine (PROCARDIA XL) 60 MG (OSM) 24 hr tablet, Take 1 tablet (60 mg total) by mouth once daily., Disp: 30 tablet, Rfl: 11    OPW TEST CLAIM - DO NOT FILL, Inject into the skin. OPW test claim. Do not fill. (Patient not " "taking: Reported on 2/21/2020), Disp: 30 mL, Rfl: 0    OPW TEST CLAIM - DO NOT FILL, Inject into the skin. OPW test claim. Do not fill. (Patient not taking: Reported on 2/21/2020), Disp: 15 mL, Rfl: 0    pen needle, diabetic 29 gauge x 1/2" Ndle, Use four times daily with insulin pens (Patient not taking: Reported on 2/21/2020), Disp: 100 each, Rfl: 11    TRUE METRIX GLUCOSE TEST STRIP Strp, , Disp: , Rfl:     TRUEPLUS LANCETS 28 gauge Misc, , Disp: , Rfl:       Vitals:    02/21/20 0945   BP: (Abnormal) 170/96   Pulse: 105   Resp: 15     Physical Exam   Constitutional: She is oriented to person, place, and time. She appears well-developed and well-nourished. No distress.   HENT:   Head: Normocephalic and atraumatic.   Eyes: Conjunctivae are normal.   Neck: Normal range of motion. Neck supple.   Pulmonary/Chest: Effort normal.   Abdominal: Soft. There is no tenderness. There is no rebound and no guarding.   Incision: Aquacel dressing removed. Staples removed.  Incision healing well.   Neurological: She is alert and oriented to person, place, and time.   Skin: Skin is warm and dry. No erythema.   Psychiatric: She has a normal mood and affect. Her behavior is normal. Judgment and thought content normal.   Vitals reviewed.        Assessment:    1. Normal postpartum exam  2. Doing well S/P RLTCS/BTL   3. Pre-eclampsia s/p magnesium sulfate with elevated BP today      Plan:    1. Wound care discussed  2. Instructions / precautions reviewed  3. Contraceptive counseling  4. Increase Procardia to 60 XL  5. Return: 1 week for BP check    "

## 2020-02-27 ENCOUNTER — CLINICAL SUPPORT (OUTPATIENT)
Dept: OBSTETRICS AND GYNECOLOGY | Facility: CLINIC | Age: 35
End: 2020-02-27
Payer: MEDICAID

## 2020-02-27 VITALS — SYSTOLIC BLOOD PRESSURE: 118 MMHG | DIASTOLIC BLOOD PRESSURE: 62 MMHG

## 2020-02-27 NOTE — PROGRESS NOTES
Patient here for b/p check at 118/62 states no more headaches. Verbalized compliance with medication and will continue.

## 2020-03-27 ENCOUNTER — POSTPARTUM VISIT (OUTPATIENT)
Dept: OBSTETRICS AND GYNECOLOGY | Facility: CLINIC | Age: 35
End: 2020-03-27
Payer: MEDICAID

## 2020-03-27 VITALS
HEART RATE: 69 BPM | BODY MASS INDEX: 26.57 KG/M2 | SYSTOLIC BLOOD PRESSURE: 120 MMHG | DIASTOLIC BLOOD PRESSURE: 76 MMHG | HEIGHT: 64 IN | RESPIRATION RATE: 15 BRPM | WEIGHT: 155.63 LBS

## 2020-03-27 PROCEDURE — 99214 OFFICE O/P EST MOD 30 MIN: CPT | Mod: PBBFAC | Performed by: OBSTETRICS & GYNECOLOGY

## 2020-03-27 PROCEDURE — 99999 PR PBB SHADOW E&M-EST. PATIENT-LVL IV: ICD-10-PCS | Mod: PBBFAC,,, | Performed by: OBSTETRICS & GYNECOLOGY

## 2020-03-27 PROCEDURE — 0503F POSTPARTUM CARE VISIT: CPT | Mod: ,,, | Performed by: OBSTETRICS & GYNECOLOGY

## 2020-03-27 PROCEDURE — 0503F PR POSTPARTUM CARE VISIT: ICD-10-PCS | Mod: ,,, | Performed by: OBSTETRICS & GYNECOLOGY

## 2020-03-27 PROCEDURE — 99999 PR PBB SHADOW E&M-EST. PATIENT-LVL IV: CPT | Mod: PBBFAC,,, | Performed by: OBSTETRICS & GYNECOLOGY

## 2020-03-27 NOTE — PROGRESS NOTES
CC: Post-partum follow-up    Kiana Hardy is a 34 y.o. female  presents for a postpartum visit.  She is status post  RLTCS with tubal 6 weeks ago.  Her hospitalization was not complicated.  She was breastfeeding but is now bottle feeding.   She denies postpartum depression. She and the baby are doing well.  No pain.  No fever.   No bowel / bladder complaints. Pregnancy was complicated by: DM, an ex-lap with RSO for complex ovarian mass, thyroid d/o, and pre-eclampsia.      Her last pap was No result found      ROS:  GENERAL: No fever, chills, fatigability.  VULVAR: No pain, no lesions and no itching.  VAGINAL: No relaxation, no itching, no discharge, no abnormal bleeding and no lesions.  ABDOMEN: No abdominal pain. Denies nausea. Denies vomiting. No diarrhea. No constipation  BREAST: Denies pain. No lumps. No discharge.  URINARY: No incontinence, no nocturia, no frequency and no dysuria.  CARDIOVASCULAR: No chest pain. No shortness of breath. No leg cramps.  NEUROLOGICAL: No headaches. No vision changes.    Past Medical History:   Diagnosis Date    Diabetes mellitus     Pregnancy 2019    Thyroid disease     hypothyroidism    Thyroid disease during pregnancy, second trimester 10/18/2019     Past Surgical History:   Procedure Laterality Date     SECTION       SECTION N/A 2020    Procedure: REPEAT  SECTION;  Surgeon: Onelia Crespo MD;  Location: [facility];  Service: OB/GYN;  Laterality: N/A;    CHOLECYSTECTOMY      DILATION AND CURETTAGE OF UTERUS      FOOT SURGERY Right     has a nail in the foot    SALPINGECTOMY Left 2020    Procedure: SALPINGECTOMY;  Surgeon: Onelia Crespo MD;  Location: [facility];  Service: OB/GYN;  Laterality: Left;    SALPINGOOPHORECTOMY Right 2019    Procedure: RIGHT SALPINGO-OOPHORECTOMY USO;  Surgeon: Mary Iglesias MD;  Location: [facility];  Service: OB/GYN;  Laterality: Right;    TONSILLECTOMY       "TUBAL LIGATION       Review of patient's allergies indicates:  No Known Allergies    Current Outpatient Medications:     BD INSULIN SYRINGE ULTRA-FINE 0.5 mL 31 gauge x 5/16" Syrg, Inject 1 Syringe into the skin once daily., Disp: , Rfl:     blood sugar diagnostic (RELION PRIME TEST STRIPS) Strp, 1 strip by Misc.(Non-Drug; Combo Route) route 4 (four) times daily., Disp: 200 strip, Rfl: 0    CLASSIC PRENATAL 28 mg iron- 800 mcg Tab, Take 1 tablet by mouth once daily., Disp: , Rfl: 0    levothyroxine (SYNTHROID) 25 MCG tablet, Take 25 mcg by mouth once daily., Disp: , Rfl:     metFORMIN (GLUCOPHAGE) 1000 MG tablet, Take 1 tablet (1,000 mg total) by mouth 2 (two) times daily with meals., Disp: 60 tablet, Rfl: 11    NIFEdipine (PROCARDIA XL) 60 MG (OSM) 24 hr tablet, Take 1 tablet (60 mg total) by mouth once daily., Disp: 30 tablet, Rfl: 11    pen needle, diabetic 29 gauge x 1/2" Ndle, Use four times daily with insulin pens, Disp: 100 each, Rfl: 11    TRUE METRIX GLUCOSE TEST STRIP Strp, , Disp: , Rfl:     TRUEPLUS LANCETS 28 gauge Misc, , Disp: , Rfl:     aspirin (ECOTRIN) 81 MG EC tablet, Take 81 mg by mouth once daily., Disp: , Rfl:     citalopram (CELEXA) 10 MG tablet, citalopram 10 mg tablet  Take 1 tablet(s) every day by oral route., Disp: , Rfl:     ibuprofen (ADVIL,MOTRIN) 600 MG tablet, Take 1 tablet (600 mg total) by mouth 3 (three) times daily. (Patient not taking: Reported on 2/21/2020), Disp: 30 tablet, Rfl: 3    insulin glargine (LANTUS U-100 INSULIN) 100 unit/mL injection, Inject 34 Units into the skin every evening. (Patient not taking: Reported on 2/21/2020), Disp: 10 mL, Rfl: 5    insulin syringe-needle U-100 (BD INSULIN SYRINGE ULTRA-FINE) 0.3 mL 31 gauge x 5/16" Syrg, Use in the evening as directed. (Patient not taking: Reported on 3/27/2020), Disp: 100 each, Rfl: 11    OPW TEST CLAIM - DO NOT FILL, Inject into the skin. OPW test claim. Do not fill. (Patient not taking: Reported on " 2/21/2020), Disp: 30 mL, Rfl: 0    OPW TEST CLAIM - DO NOT FILL, Inject into the skin. OPW test claim. Do not fill. (Patient not taking: Reported on 2/21/2020), Disp: 15 mL, Rfl: 0    oxyCODONE-acetaminophen (PERCOCET) 5-325 mg per tablet, Take 1 tablet by mouth every 4 (four) hours as needed. (Patient not taking: Reported on 3/27/2020), Disp: 15 each, Rfl: 0      Vitals:    03/27/20 1422   BP: 120/76   Pulse: 69   Resp: 15     Physical Exam   Constitutional: She is oriented to person, place, and time. She appears well-developed and well-nourished. No distress.   HENT:   Head: Normocephalic and atraumatic.   Eyes: Conjunctivae are normal.   Neck: Normal range of motion. Neck supple.   Pulmonary/Chest: Effort normal.   Abdominal: Soft. She exhibits no mass. There is no tenderness. There is no rebound.   Incision: well healed   Neurological: She is alert and oriented to person, place, and time.   Skin: Skin is warm and dry.   Psychiatric: She has a normal mood and affect. Her behavior is normal. Judgment and thought content normal.   Vitals reviewed.        Assessment:    1. Normal postpartum exam  2. Doing well S/P RLTCS with left tubal ligation      Plan:    1. Routine follow up.  2. Continue all medications and follow up with PCP for BP and DM management.   3. Contraceptive counseling  4. May resume normal activities  5. Return: for annual exam or as needed

## 2020-04-27 RX ORDER — MEDROXYPROGESTERONE ACETATE 150 MG/ML
150 INJECTION, SUSPENSION INTRAMUSCULAR ONCE
Qty: 1 ML | Refills: 3 | Status: SHIPPED | OUTPATIENT
Start: 2020-04-27 | End: 2021-05-18

## 2020-04-27 NOTE — TELEPHONE ENCOUNTER
----- Message from Jenna Grant sent at 4/27/2020  9:12 AM CDT -----  Contact: Self  Kiana Hardy  MRN: [id number]  Home Phone      [phone]  Work Phone      [phone]  Mobile          [phone]    Patient Care Team:  Grady Billings MD as PCP - General (Internal Medicine)  OB? No  What phone number can you be reached at? [phone]  Message:   Pt has questions about [facility] will not give it to her but if Dr Crespo write the Rx she can find someone to inject her. Please advise.

## 2020-04-27 NOTE — TELEPHONE ENCOUNTER
Patient had baby in [facility] requesting to get on depo provera injection please advise

## 2020-04-28 ENCOUNTER — CLINICAL SUPPORT (OUTPATIENT)
Dept: OBSTETRICS AND GYNECOLOGY | Facility: CLINIC | Age: 35
End: 2020-04-28
Payer: MEDICAID

## 2020-04-28 DIAGNOSIS — Z30.9 ENCOUNTER FOR CONTRACEPTIVE MANAGEMENT, UNSPECIFIED TYPE: Primary | ICD-10-CM

## 2020-04-28 LAB
B-HCG UR QL: NEGATIVE
CTP QC/QA: YES

## 2020-04-28 PROCEDURE — 81025 URINE PREGNANCY TEST: CPT | Mod: PBBFAC

## 2020-04-28 NOTE — PROGRESS NOTES
MEDROXYPROGESTERONE 4/28/2020 AT 4:18PM RIGHT HIP LOT [id number] EXP. 11/21 . NEXT WINDOW 7/14--7/28/2020

## 2020-07-20 NOTE — PROGRESS NOTES
Medroxyprogesterone 150 mg / 1ml injection given in the left hip at 10:45 am on 07/16/2020  LOT [id number] EXP 10/2022 next window is 10/01/2020 to 10/15/2020  Mylinh Heath PharmD

## 2020-08-20 ENCOUNTER — TELEPHONE (OUTPATIENT)
Dept: OBSTETRICS AND GYNECOLOGY | Facility: CLINIC | Age: 35
End: 2020-08-20

## 2020-08-20 NOTE — TELEPHONE ENCOUNTER
----- Message from Jenna Grant sent at 8/20/2020  8:25 AM CDT -----  Contact: Self  Kiana Hardy  MRN: [id number]  Home Phone      [phone]  Work Phone      Not on file.  Mobile          [phone]    Patient Care Team:  Grady Billings MD as PCP - General (Internal Medicine)  OB? No  What phone number can you be reached at? [phone]  Message:   Pt had baby in Feb and started depo. Would like to discuss spotting with a nurse. Please advise.

## 2020-08-20 NOTE — TELEPHONE ENCOUNTER
Patient reports that she has been on Depo Provera since delivery of her baby and BTL in February. Patient reports that she requested to stay on Depo due to history of heavy cycles. Patient reports that she has been bleeding daily since February. Patient reports that some days it is spotting and some days it is a light flow but it is every day. Patient instructed that Dr Crespo is off today and that message would be sent upon her return tomorrow for recommendations. Patient verbalized understanding.

## 2020-08-21 ENCOUNTER — TELEPHONE (OUTPATIENT)
Dept: OBSTETRICS AND GYNECOLOGY | Facility: CLINIC | Age: 35
End: 2020-08-21

## 2020-08-21 NOTE — TELEPHONE ENCOUNTER
Patient notified of recommendations to stop Depo-Provera and see how cycles continue. Patient voiced understanding.

## 2020-08-21 NOTE — TELEPHONE ENCOUNTER
I would recommend that she stop the DepoProvera and see what happens with her cycles.  It may take some time to get back to normal again.     If she is ever bleeding very heavy, she should let us know.

## 2020-08-21 NOTE — TELEPHONE ENCOUNTER
Patient calling in reference to message forwarded to Dr. Crespo, yesterday. See telephone encounter dated 08/20/20. Patient instructed of Dr. Crespo recommendations. Voiced understanding.

## 2021-05-06 ENCOUNTER — PATIENT MESSAGE (OUTPATIENT)
Dept: RESEARCH | Facility: HOSPITAL | Age: 36
End: 2021-05-06

## 2021-05-17 ENCOUNTER — TELEPHONE (OUTPATIENT)
Dept: OBSTETRICS AND GYNECOLOGY | Facility: CLINIC | Age: 36
End: 2021-05-17

## 2021-05-18 ENCOUNTER — OFFICE VISIT (OUTPATIENT)
Dept: OBSTETRICS AND GYNECOLOGY | Facility: CLINIC | Age: 36
End: 2021-05-18
Payer: MEDICAID

## 2021-05-18 VITALS
DIASTOLIC BLOOD PRESSURE: 67 MMHG | RESPIRATION RATE: 13 BRPM | BODY MASS INDEX: 26.46 KG/M2 | SYSTOLIC BLOOD PRESSURE: 122 MMHG | WEIGHT: 155 LBS | HEART RATE: 72 BPM | HEIGHT: 64 IN

## 2021-05-18 DIAGNOSIS — N89.8 VAGINAL DISCHARGE: Primary | ICD-10-CM

## 2021-05-18 PROBLEM — O99.280 THYROID DISEASE AFFECTING PREGNANCY: Status: RESOLVED | Noted: 2019-10-18 | Resolved: 2021-05-18

## 2021-05-18 PROBLEM — R10.9 ABDOMINAL PAIN DURING PREGNANCY, THIRD TRIMESTER: Status: RESOLVED | Noted: 2020-02-05 | Resolved: 2021-05-18

## 2021-05-18 PROBLEM — E07.9 THYROID DISEASE AFFECTING PREGNANCY: Status: RESOLVED | Noted: 2019-10-18 | Resolved: 2021-05-18

## 2021-05-18 PROBLEM — O24.313 PRE-EXISTING DIABETES MELLITUS AFFECTING PREGNANCY IN THIRD TRIMESTER, ANTEPARTUM: Status: RESOLVED | Noted: 2020-02-14 | Resolved: 2021-05-18

## 2021-05-18 PROBLEM — O24.913 DIABETES MELLITUS AFFECTING PREGNANCY IN THIRD TRIMESTER: Status: RESOLVED | Noted: 2019-10-18 | Resolved: 2021-05-18

## 2021-05-18 PROBLEM — O26.893 ABDOMINAL PAIN DURING PREGNANCY, THIRD TRIMESTER: Status: RESOLVED | Noted: 2020-02-05 | Resolved: 2021-05-18

## 2021-05-18 PROBLEM — Z34.90 PREGNANCY: Status: RESOLVED | Noted: 2019-09-22 | Resolved: 2021-05-18

## 2021-05-18 PROCEDURE — 87491 CHLMYD TRACH DNA AMP PROBE: CPT | Mod: 59 | Performed by: OBSTETRICS & GYNECOLOGY

## 2021-05-18 PROCEDURE — 99214 OFFICE O/P EST MOD 30 MIN: CPT | Mod: PBBFAC | Performed by: OBSTETRICS & GYNECOLOGY

## 2021-05-18 PROCEDURE — 99999 PR PBB SHADOW E&M-EST. PATIENT-LVL IV: CPT | Mod: PBBFAC,,, | Performed by: OBSTETRICS & GYNECOLOGY

## 2021-05-18 PROCEDURE — 99999 PR PBB SHADOW E&M-EST. PATIENT-LVL IV: ICD-10-PCS | Mod: PBBFAC,,, | Performed by: OBSTETRICS & GYNECOLOGY

## 2021-05-18 PROCEDURE — 87591 N.GONORRHOEAE DNA AMP PROB: CPT | Performed by: OBSTETRICS & GYNECOLOGY

## 2021-05-18 PROCEDURE — 87661 TRICHOMONAS VAGINALIS AMPLIF: CPT | Performed by: OBSTETRICS & GYNECOLOGY

## 2021-05-18 PROCEDURE — 99213 OFFICE O/P EST LOW 20 MIN: CPT | Mod: S$PBB,,, | Performed by: OBSTETRICS & GYNECOLOGY

## 2021-05-18 PROCEDURE — 99213 PR OFFICE/OUTPT VISIT, EST, LEVL III, 20-29 MIN: ICD-10-PCS | Mod: S$PBB,,, | Performed by: OBSTETRICS & GYNECOLOGY

## 2021-05-18 PROCEDURE — 87481 CANDIDA DNA AMP PROBE: CPT | Mod: 59 | Performed by: OBSTETRICS & GYNECOLOGY

## 2021-05-18 RX ORDER — METRONIDAZOLE 500 MG/1
500 TABLET ORAL EVERY 12 HOURS
Qty: 14 TABLET | Refills: 0 | Status: SHIPPED | OUTPATIENT
Start: 2021-05-18 | End: 2021-05-25

## 2021-05-19 LAB
BACTERIAL VAGINOSIS DNA: NEGATIVE
C TRACH DNA SPEC QL NAA+PROBE: NOT DETECTED
CANDIDA GLABRATA DNA: NEGATIVE
CANDIDA KRUSEI DNA: NEGATIVE
CANDIDA RRNA VAG QL PROBE: NEGATIVE
N GONORRHOEA DNA SPEC QL NAA+PROBE: NOT DETECTED
T VAGINALIS RRNA GENITAL QL PROBE: POSITIVE

## 2023-04-22 ENCOUNTER — HOSPITAL ENCOUNTER (EMERGENCY)
Facility: HOSPITAL | Age: 38
Discharge: HOME | End: 2023-04-23
Attending: EMERGENCY MEDICINE
Payer: MEDICAID

## 2023-04-22 VITALS
SYSTOLIC BLOOD PRESSURE: 155 MMHG | WEIGHT: 156.44 LBS | DIASTOLIC BLOOD PRESSURE: 86 MMHG | BODY MASS INDEX: 26.71 KG/M2 | OXYGEN SATURATION: 100 % | TEMPERATURE: 98 F | HEART RATE: 100 BPM | HEIGHT: 64 IN | RESPIRATION RATE: 18 BRPM

## 2023-04-22 DIAGNOSIS — K08.89 PAIN, DENTAL: Primary | ICD-10-CM

## 2023-04-22 DIAGNOSIS — K02.9 DENTAL CARIES: ICD-10-CM

## 2023-04-22 PROCEDURE — 99284 EMERGENCY DEPT VISIT MOD MDM: CPT

## 2023-04-23 PROCEDURE — 25000003 PHARM REV CODE 250: Performed by: EMERGENCY MEDICINE

## 2023-04-23 RX ORDER — CLINDAMYCIN HYDROCHLORIDE 150 MG/1
300 CAPSULE ORAL
Status: COMPLETED | OUTPATIENT
Start: 2023-04-23 | End: 2023-04-23

## 2023-04-23 RX ORDER — IBUPROFEN 600 MG/1
600 TABLET ORAL
Status: COMPLETED | OUTPATIENT
Start: 2023-04-23 | End: 2023-04-23

## 2023-04-23 RX ORDER — CLINDAMYCIN HYDROCHLORIDE 300 MG/1
300 CAPSULE ORAL EVERY 8 HOURS
Qty: 21 CAPSULE | Refills: 0 | Status: SHIPPED | OUTPATIENT
Start: 2023-04-23 | End: 2023-04-30

## 2023-04-23 RX ORDER — IBUPROFEN 600 MG/1
600 TABLET ORAL EVERY 8 HOURS PRN
Qty: 21 TABLET | Refills: 0 | Status: SHIPPED | OUTPATIENT
Start: 2023-04-23 | End: 2023-04-30

## 2023-04-23 RX ADMIN — IBUPROFEN 600 MG: 600 TABLET ORAL at 12:04

## 2023-04-23 RX ADMIN — CLINDAMYCIN HYDROCHLORIDE 300 MG: 150 CAPSULE ORAL at 12:04

## 2023-04-23 NOTE — ED PROVIDER NOTES
Encounter Date: 2023       History     Chief Complaint   Patient presents with    Dental Pain     TO ED WITH C/O OF DENTAL PAIN THAT STARTED TODAY TO LEFT UPPER.      38 YO MALE WHO COMES IN TODAY DUE TO DENTAL PAIN AND DENTAL CARIES.  SHE STATES THAT SHE HAS A DENTAL APPOINTMENT IN ONE MONTH.  ON EVALUATION, THE PATIENT HAS NUMEROUS DENTAL CARIES.       Review of patient's allergies indicates:  Not on File  Past Medical History:   Diagnosis Date    Diabetes mellitus     Thyroid disease     hypothyroidism    Thyroid disease during pregnancy, second trimester 10/18/2019     Past Surgical History:   Procedure Laterality Date     SECTION N/A 2020    Procedure: REPEAT  SECTION;  Surgeon: Onelia Crespo MD;  Location: [facility];  Service: OB/GYN;  Laterality: N/A;    CHOLECYSTECTOMY      DILATION AND CURETTAGE OF UTERUS      FOOT SURGERY Right     has a nail in the foot    SALPINGECTOMY Left 2020    Procedure: SALPINGECTOMY;  Surgeon: Onelia Crespo MD;  Location: [facility];  Service: OB/GYN;  Laterality: Left;    SALPINGOOPHORECTOMY Right 2019    Procedure: RIGHT SALPINGO-OOPHORECTOMY USO;  Surgeon: Mary Iglesias MD;  Location: [facility];  Service: OB/GYN;  Laterality: Right;    TONSILLECTOMY      TUBAL LIGATION       Family History   Problem Relation Age of Onset    COPD Mother     Breast cancer Maternal Grandmother     Diabetes Father     Ovarian cancer Neg Hx     Stroke Neg Hx     Colon cancer Neg Hx      Social History     Tobacco Use    Smoking status: Every Day     Packs/day: 0.50     Years: 23.00     Pack years: 11.50     Types: Cigarettes    Smokeless tobacco: Never   Substance Use Topics    Alcohol use: Not Currently    Drug use: Yes     Types: Methamphetamines, Marijuana     Comment: last use mauijuana & meth      Review of Systems   Constitutional: Negative.    HENT:  Positive for dental problem.    Eyes: Negative.    Respiratory: Negative.      Cardiovascular: Negative.    Gastrointestinal: Negative.    Genitourinary: Negative.    Musculoskeletal: Negative.    Skin: Negative.    Neurological: Negative.    Hematological: Negative.    Psychiatric/Behavioral: Negative.       Physical Exam     Initial Vitals [04/22/23 2337]   BP Pulse Resp Temp SpO2   (!) 155/86 100 18 97.7 °F (36.5 °C) 100 %      MAP       --         Physical Exam    Nursing note and vitals reviewed.  Constitutional: She appears well-developed and well-nourished.   HENT:   Head: Normocephalic and atraumatic.   DENTAL CARIES AND FRACTURED TEETH DIFFUSELY   Eyes: EOM are normal. Pupils are equal, round, and reactive to light.   Neck: Neck supple.   Normal range of motion.  Cardiovascular:  Normal rate, regular rhythm and normal heart sounds.           Pulmonary/Chest: Breath sounds normal.   Musculoskeletal:         General: Normal range of motion.      Cervical back: Normal range of motion and neck supple.     Neurological: She is alert and oriented to person, place, and time.   Skin: Skin is warm.   Psychiatric: She has a normal mood and affect.       ED Course   Procedures  Labs Reviewed - No data to display       Imaging Results    None          Medications   clindamycin capsule 300 mg (300 mg Oral Given 4/23/23 0035)   ibuprofen tablet 600 mg (600 mg Oral Given 4/23/23 0035)     Medical Decision Making:   Differential Diagnosis:   DENTAL PAIN, DENTAL ABSCESS, DENTAL CARIES   ED Management:  38 YO FEMALE WHO COMES IN TODAY DUE TO DENTAL PAIN AND DENTAL CARIES.  SHE STATES  THAT SHE DOES HAVE AN APPOINTMENT WITH A DENTIST IN ONE MONTH.  HOME TODAY.                         Clinical Impression:   Final diagnoses:  [K08.89] Pain, dental (Primary)  [K02.9] Dental caries        ED Disposition Condition    Discharge Stable          ED Prescriptions       Medication Sig Dispense Start Date End Date Auth. Provider    ibuprofen (ADVIL,MOTRIN) 600 MG tablet Take 1 tablet (600 mg total) by mouth  every 8 (eight) hours as needed for Pain. 21 tablet 4/23/2023 4/30/2023 Maida Serra MD    clindamycin (CLEOCIN) 300 MG capsule Take 1 capsule (300 mg total) by mouth every 8 (eight) hours. for 7 days 21 capsule 4/23/2023 4/30/2023 Maida Serra MD          Follow-up Information    None          Maida Serra MD  04/23/23 0032

## 2023-08-28 ENCOUNTER — HOSPITAL ENCOUNTER (INPATIENT)
Facility: HOSPITAL | Age: 38
LOS: 7 days | Discharge: HOME | DRG: 638 | End: 2023-09-04
Attending: EMERGENCY MEDICINE | Admitting: INTERNAL MEDICINE
Payer: MEDICAID

## 2023-08-28 DIAGNOSIS — E11.00 HYPEROSMOLAR HYPERGLYCEMIC STATE (HHS): Primary | ICD-10-CM

## 2023-08-28 DIAGNOSIS — E11.00 TYPE 2 DIABETES MELLITUS WITH HYPEROSMOLAR HYPERGLYCEMIC STATE (HHS): ICD-10-CM

## 2023-08-28 LAB
ALBUMIN SERPL BCP-MCNC: 3.8 G/DL (ref 3.5–5.2)
ALBUMIN SERPL BCP-MCNC: 4 G/DL (ref 3.5–5.2)
ALP SERPL-CCNC: 142 U/L (ref 55–135)
ALP SERPL-CCNC: 170 U/L (ref 55–135)
ALT SERPL W/O P-5'-P-CCNC: 107 U/L (ref 10–44)
ALT SERPL W/O P-5'-P-CCNC: 97 U/L (ref 10–44)
ANION GAP SERPL CALC-SCNC: 19 MMOL/L (ref 8–16)
ANION GAP SERPL CALC-SCNC: 26 MMOL/L (ref 8–16)
AST SERPL-CCNC: 35 U/L (ref 10–40)
AST SERPL-CCNC: 46 U/L (ref 10–40)
B-OH-BUTYR BLD STRIP-SCNC: 0.2 MMOL/L (ref 0–0.5)
BACTERIA #/AREA URNS HPF: ABNORMAL /HPF
BASOPHILS # BLD AUTO: 0.06 K/UL (ref 0–0.2)
BASOPHILS NFR BLD: 0.6 % (ref 0–1.9)
BILIRUB SERPL-MCNC: 0.2 MG/DL (ref 0.1–1)
BILIRUB SERPL-MCNC: 0.3 MG/DL (ref 0.1–1)
BILIRUB UR QL STRIP: NEGATIVE
BUN SERPL-MCNC: 16 MG/DL (ref 6–20)
BUN SERPL-MCNC: 20 MG/DL (ref 6–20)
CALCIUM SERPL-MCNC: 10 MG/DL (ref 8.7–10.5)
CALCIUM SERPL-MCNC: 10.2 MG/DL (ref 8.7–10.5)
CHLORIDE SERPL-SCNC: 87 MMOL/L (ref 95–110)
CHLORIDE SERPL-SCNC: 93 MMOL/L (ref 95–110)
CLARITY UR: CLEAR
CO2 SERPL-SCNC: 12 MMOL/L (ref 23–29)
CO2 SERPL-SCNC: 7 MMOL/L (ref 23–29)
COLOR UR: YELLOW
CREAT SERPL-MCNC: 0.8 MG/DL (ref 0.5–1.4)
CREAT SERPL-MCNC: 1.3 MG/DL (ref 0.5–1.4)
DIFFERENTIAL METHOD: ABNORMAL
EOSINOPHIL # BLD AUTO: 0 K/UL (ref 0–0.5)
EOSINOPHIL NFR BLD: 0.2 % (ref 0–8)
ERYTHROCYTE [DISTWIDTH] IN BLOOD BY AUTOMATED COUNT: 12.7 % (ref 11.5–14.5)
EST. GFR  (NO RACE VARIABLE): 54 ML/MIN/1.73 M^2
EST. GFR  (NO RACE VARIABLE): >60 ML/MIN/1.73 M^2
GLUCOSE SERPL-MCNC: 351 MG/DL (ref 70–110)
GLUCOSE SERPL-MCNC: 729 MG/DL (ref 70–110)
GLUCOSE UR QL STRIP: ABNORMAL
HCT VFR BLD AUTO: 42.7 % (ref 37–48.5)
HGB BLD-MCNC: 15.4 G/DL (ref 12–16)
HGB UR QL STRIP: ABNORMAL
IMM GRANULOCYTES # BLD AUTO: 0.11 K/UL (ref 0–0.04)
IMM GRANULOCYTES NFR BLD AUTO: 1.1 % (ref 0–0.5)
KETONES UR QL STRIP: ABNORMAL
LEUKOCYTE ESTERASE UR QL STRIP: NEGATIVE
LYMPHOCYTES # BLD AUTO: 1.7 K/UL (ref 1–4.8)
LYMPHOCYTES NFR BLD: 15.9 % (ref 18–48)
MAGNESIUM SERPL-MCNC: 2 MG/DL (ref 1.6–2.6)
MCH RBC QN AUTO: 31.3 PG (ref 27–31)
MCHC RBC AUTO-ENTMCNC: 36.1 G/DL (ref 32–36)
MCV RBC AUTO: 87 FL (ref 82–98)
MICROSCOPIC COMMENT: ABNORMAL
MONOCYTES # BLD AUTO: 0.3 K/UL (ref 0.3–1)
MONOCYTES NFR BLD: 2.4 % (ref 4–15)
NEUTROPHILS # BLD AUTO: 8.3 K/UL (ref 1.8–7.7)
NEUTROPHILS NFR BLD: 79.8 % (ref 38–73)
NITRITE UR QL STRIP: NEGATIVE
NRBC BLD-RTO: 0 /100 WBC
PH UR STRIP: 6 [PH] (ref 5–8)
PHOSPHATE SERPL-MCNC: 3.2 MG/DL (ref 2.7–4.5)
PLATELET # BLD AUTO: 233 K/UL (ref 150–450)
PMV BLD AUTO: 13.5 FL (ref 9.2–12.9)
POC PH VENOUS: 7.42
POCT GLUCOSE: 245 MG/DL (ref 70–110)
POCT GLUCOSE: 251 MG/DL (ref 70–110)
POCT GLUCOSE: 251 MG/DL (ref 70–110)
POCT GLUCOSE: 266 MG/DL (ref 70–110)
POCT GLUCOSE: 363 MG/DL (ref 70–110)
POCT GLUCOSE: 398 MG/DL (ref 70–110)
POCT GLUCOSE: >500 MG/DL (ref 70–110)
POCT GLUCOSE: >500 MG/DL (ref 70–110)
POTASSIUM SERPL-SCNC: 4.2 MMOL/L (ref 3.5–5.1)
POTASSIUM SERPL-SCNC: 5 MMOL/L (ref 3.5–5.1)
PROT SERPL-MCNC: 8.8 G/DL (ref 6–8.4)
PROT SERPL-MCNC: 8.8 G/DL (ref 6–8.4)
PROT UR QL STRIP: NEGATIVE
RBC # BLD AUTO: 4.92 M/UL (ref 4–5.4)
RBC #/AREA URNS HPF: 5 /HPF (ref 0–4)
SODIUM SERPL-SCNC: 120 MMOL/L (ref 136–145)
SODIUM SERPL-SCNC: 124 MMOL/L (ref 136–145)
SP GR UR STRIP: 1.01 (ref 1–1.03)
URN SPEC COLLECT METH UR: ABNORMAL
UROBILINOGEN UR STRIP-ACNC: NEGATIVE EU/DL
WBC # BLD AUTO: 10.41 K/UL (ref 3.9–12.7)
YEAST URNS QL MICRO: ABNORMAL

## 2023-08-28 PROCEDURE — 36415 COLL VENOUS BLD VENIPUNCTURE: CPT | Performed by: INTERNAL MEDICINE

## 2023-08-28 PROCEDURE — 85025 COMPLETE CBC W/AUTO DIFF WBC: CPT | Performed by: EMERGENCY MEDICINE

## 2023-08-28 PROCEDURE — 96361 HYDRATE IV INFUSION ADD-ON: CPT

## 2023-08-28 PROCEDURE — 81000 URINALYSIS NONAUTO W/SCOPE: CPT | Performed by: EMERGENCY MEDICINE

## 2023-08-28 PROCEDURE — 99285 EMERGENCY DEPT VISIT HI MDM: CPT

## 2023-08-28 PROCEDURE — 96374 THER/PROPH/DIAG INJ IV PUSH: CPT

## 2023-08-28 PROCEDURE — 83036 HEMOGLOBIN GLYCOSYLATED A1C: CPT | Performed by: EMERGENCY MEDICINE

## 2023-08-28 PROCEDURE — 63600175 PHARM REV CODE 636 W HCPCS: Performed by: EMERGENCY MEDICINE

## 2023-08-28 PROCEDURE — 80053 COMPREHEN METABOLIC PANEL: CPT | Mod: 91 | Performed by: INTERNAL MEDICINE

## 2023-08-28 PROCEDURE — 84100 ASSAY OF PHOSPHORUS: CPT | Mod: 91 | Performed by: EMERGENCY MEDICINE

## 2023-08-28 PROCEDURE — 94760 N-INVAS EAR/PLS OXIMETRY 1: CPT

## 2023-08-28 PROCEDURE — 20000000 HC ICU ROOM

## 2023-08-28 PROCEDURE — 25000003 PHARM REV CODE 250: Performed by: EMERGENCY MEDICINE

## 2023-08-28 PROCEDURE — 99900035 HC TECH TIME PER 15 MIN (STAT)

## 2023-08-28 PROCEDURE — 25000003 PHARM REV CODE 250: Performed by: INTERNAL MEDICINE

## 2023-08-28 PROCEDURE — 82800 BLOOD PH: CPT | Performed by: EMERGENCY MEDICINE

## 2023-08-28 PROCEDURE — 80053 COMPREHEN METABOLIC PANEL: CPT | Performed by: EMERGENCY MEDICINE

## 2023-08-28 PROCEDURE — 82010 KETONE BODYS QUAN: CPT | Performed by: EMERGENCY MEDICINE

## 2023-08-28 PROCEDURE — 82962 GLUCOSE BLOOD TEST: CPT

## 2023-08-28 PROCEDURE — 80048 BASIC METABOLIC PNL TOTAL CA: CPT | Mod: XB | Performed by: EMERGENCY MEDICINE

## 2023-08-28 PROCEDURE — 83735 ASSAY OF MAGNESIUM: CPT | Performed by: EMERGENCY MEDICINE

## 2023-08-28 PROCEDURE — 36415 COLL VENOUS BLD VENIPUNCTURE: CPT | Performed by: EMERGENCY MEDICINE

## 2023-08-28 RX ORDER — SODIUM CHLORIDE 0.9 % (FLUSH) 0.9 %
10 SYRINGE (ML) INJECTION
Status: DISCONTINUED | OUTPATIENT
Start: 2023-08-28 | End: 2023-09-04 | Stop reason: HOSPADM

## 2023-08-28 RX ORDER — MUPIROCIN 20 MG/G
OINTMENT TOPICAL 2 TIMES DAILY
Status: COMPLETED | OUTPATIENT
Start: 2023-08-28 | End: 2023-09-02

## 2023-08-28 RX ORDER — SODIUM CHLORIDE 0.9 % (FLUSH) 0.9 %
10 SYRINGE (ML) INJECTION
Status: DISCONTINUED | OUTPATIENT
Start: 2023-08-28 | End: 2023-08-30

## 2023-08-28 RX ORDER — PREDNISONE 50 MG/1
50 TABLET ORAL DAILY
Status: ON HOLD | COMMUNITY
Start: 2023-08-26 | End: 2023-09-04 | Stop reason: HOSPADM

## 2023-08-28 RX ORDER — SODIUM CHLORIDE 9 MG/ML
1000 INJECTION, SOLUTION INTRAVENOUS CONTINUOUS
Status: ACTIVE | OUTPATIENT
Start: 2023-08-28 | End: 2023-08-29

## 2023-08-28 RX ORDER — TALC
6 POWDER (GRAM) TOPICAL NIGHTLY PRN
Status: CANCELLED | OUTPATIENT
Start: 2023-08-28

## 2023-08-28 RX ORDER — SODIUM CHLORIDE 0.9 % (FLUSH) 0.9 %
10 SYRINGE (ML) INJECTION
Status: CANCELLED | OUTPATIENT
Start: 2023-08-28

## 2023-08-28 RX ORDER — CYCLOBENZAPRINE HCL 5 MG
5 TABLET ORAL EVERY 6 HOURS PRN
Status: DISCONTINUED | OUTPATIENT
Start: 2023-08-28 | End: 2023-08-31

## 2023-08-28 RX ORDER — DICLOFENAC POTASSIUM 50 MG/1
50 TABLET, FILM COATED ORAL EVERY 8 HOURS PRN
COMMUNITY
Start: 2023-08-26

## 2023-08-28 RX ORDER — DEXTROSE MONOHYDRATE AND SODIUM CHLORIDE 5; .45 G/100ML; G/100ML
INJECTION, SOLUTION INTRAVENOUS CONTINUOUS PRN
Status: CANCELLED | OUTPATIENT
Start: 2023-08-28

## 2023-08-28 RX ORDER — SODIUM CHLORIDE 9 MG/ML
1000 INJECTION, SOLUTION INTRAVENOUS CONTINUOUS
Status: CANCELLED | OUTPATIENT
Start: 2023-08-28

## 2023-08-28 RX ORDER — HYDROCODONE BITARTRATE AND ACETAMINOPHEN 7.5; 325 MG/1; MG/1
1 TABLET ORAL EVERY 6 HOURS PRN
COMMUNITY
Start: 2023-05-16 | End: 2023-08-28

## 2023-08-28 RX ORDER — IBUPROFEN 800 MG/1
800 TABLET ORAL EVERY 6 HOURS PRN
COMMUNITY
Start: 2023-07-13 | End: 2023-08-28

## 2023-08-28 RX ORDER — DEXTROSE MONOHYDRATE AND SODIUM CHLORIDE 5; .45 G/100ML; G/100ML
INJECTION, SOLUTION INTRAVENOUS CONTINUOUS PRN
Status: DISCONTINUED | OUTPATIENT
Start: 2023-08-28 | End: 2023-09-04 | Stop reason: HOSPADM

## 2023-08-28 RX ORDER — AMOXICILLIN 875 MG/1
875 TABLET, FILM COATED ORAL EVERY 12 HOURS
COMMUNITY
Start: 2023-07-13 | End: 2023-08-28

## 2023-08-28 RX ORDER — CYCLOBENZAPRINE HCL 10 MG
10 TABLET ORAL EVERY 8 HOURS
Status: ON HOLD | COMMUNITY
Start: 2023-08-26 | End: 2023-09-04 | Stop reason: HOSPADM

## 2023-08-28 RX ORDER — DEXTROSE MONOHYDRATE AND SODIUM CHLORIDE 5; .45 G/100ML; G/100ML
INJECTION, SOLUTION INTRAVENOUS CONTINUOUS PRN
Status: DISCONTINUED | OUTPATIENT
Start: 2023-08-28 | End: 2023-09-01

## 2023-08-28 RX ORDER — SODIUM CHLORIDE 9 MG/ML
1000 INJECTION, SOLUTION INTRAVENOUS CONTINUOUS
Status: DISCONTINUED | OUTPATIENT
Start: 2023-08-28 | End: 2023-08-29

## 2023-08-28 RX ORDER — ENOXAPARIN SODIUM 100 MG/ML
40 INJECTION SUBCUTANEOUS EVERY 24 HOURS
Status: DISCONTINUED | OUTPATIENT
Start: 2023-08-29 | End: 2023-09-04 | Stop reason: HOSPADM

## 2023-08-28 RX ORDER — TALC
6 POWDER (GRAM) TOPICAL NIGHTLY PRN
Status: DISCONTINUED | OUTPATIENT
Start: 2023-08-28 | End: 2023-09-04 | Stop reason: HOSPADM

## 2023-08-28 RX ADMIN — SODIUM CHLORIDE 1000 ML: 0.9 INJECTION, SOLUTION INTRAVENOUS at 07:08

## 2023-08-28 RX ADMIN — Medication 6 MG: at 09:08

## 2023-08-28 RX ADMIN — MUPIROCIN: 20 OINTMENT TOPICAL at 09:08

## 2023-08-28 RX ADMIN — CYCLOBENZAPRINE HYDROCHLORIDE 5 MG: 5 TABLET, FILM COATED ORAL at 09:08

## 2023-08-28 RX ADMIN — INSULIN HUMAN 0.01 UNITS/KG/HR: 1 INJECTION, SOLUTION INTRAVENOUS at 08:08

## 2023-08-28 RX ADMIN — SODIUM CHLORIDE 1000 ML: 9 INJECTION, SOLUTION INTRAVENOUS at 03:08

## 2023-08-28 RX ADMIN — INSULIN HUMAN 10 UNITS: 100 INJECTION, SOLUTION PARENTERAL at 03:08

## 2023-08-28 RX ADMIN — SODIUM CHLORIDE 1000 ML: 9 INJECTION, SOLUTION INTRAVENOUS at 08:08

## 2023-08-28 NOTE — ED TRIAGE NOTES
"C/o extreme thirst. Patient reports has not taken care of her diabetes in a few years. Patient checked her blood sugar at her fathers house and it read "HI".  "

## 2023-08-28 NOTE — ED PROVIDER NOTES
[facility]                                                  Chief Complaint  37 y.o. female with Hyperglycemia    History of Present Illness  Kiana Hardy presents to the emergency room with complaints of hyperglycemia.  Patient states that she is type 2 diabetes but has not taken care of it for a few years .  She is not on any medicine.  She is now complaining of glucose monitor reading high as well as having extremely dry mouth and frequent urination.  Patient states that she is not been hospitalized for diabetes in the past.  She took insulin only while pregnant in the past.    Past Medical History:   Diagnosis Date    Diabetes mellitus     Thyroid disease     hypothyroidism    Thyroid disease during pregnancy, second trimester 10/18/2019     Past Surgical History:   Procedure Laterality Date     SECTION N/A 2020    Procedure: REPEAT  SECTION;  Surgeon: Onelia Crespo MD;  Location: [facility];  Service: OB/GYN;  Laterality: N/A;    CHOLECYSTECTOMY      DILATION AND CURETTAGE OF UTERUS      FOOT SURGERY Right     has a nail in the foot    SALPINGECTOMY Left 2020    Procedure: SALPINGECTOMY;  Surgeon: Onelia Crespo MD;  Location: [facility];  Service: OB/GYN;  Laterality: Left;    SALPINGOOPHORECTOMY Right 2019    Procedure: RIGHT SALPINGO-OOPHORECTOMY USO;  Surgeon: Mary Iglesias MD;  Location: [facility];  Service: OB/GYN;  Laterality: Right;    TONSILLECTOMY      TUBAL LIGATION        Review of patient's allergies indicates:  No Known Allergies     Review of Systems and Physical Exam     Review of Systems  -- Constitution - no fever, no weight loss, no loss of consciousness reports elevated glucose  -- Eyes - no changes in vision, no redness, no swelling  -- Ear, Nose - no  earache, denies congestion  -- Mouth,Throat - no sore throat, no toothache, normal voice, normal swallowing reports dry mouth  -- Respiratory - denies cough and  "congestion, no shortness of breath, no wheezing  -- Cardiovascular - denies chest pain, no palpitations,   -- Gastrointestinal - denies abdominal pain, denies nausea, vomiting, and diarrhea  -- Genitourinary - no dysuria, no denies flank pain, no hematuria reports frequent urination  -- Musculoskeletal - denies back pain, negative for myalgias and arthralgias   -- Neurological - no headache, no neurologic changes, no loss of bladder or bowel function no seizure like activity, no changes in hearing or vision  -- Skin - denies skin changes, no rash, no hives, no suspected skin infection    Vital Signs   height is 5' 4" (1.626 m) and weight is 72.7 kg (160 lb 2.6 oz). Her temperature is 97.8 °F (36.6 °C). Her blood pressure is 197/97 (abnormal) and her pulse is 108. Her respiration is 18 and oxygen saturation is 97%.      Physical Exam  -- Nursing note and vitals reviewed  -- Constitutional:  Awake alert and oriented, GCS 15, no acute distress.  Appears well.  -- Head: Atraumatic. Normocephalic. No obvious abnormality  -- Eyes: Pupils are equal and reactive to light. Extraocular movements intact. No nystagmus.  No periorbital swelling. Normal conjunctiva.  -- Nose: Nose grossly normal in appearance, nares grossly normal. No rhinorrhea.  -- Throat: Mucous membranes dry, pharynx normal, normal tonsils.  Airway patent.  -- Ears: External ears and TM normal bilaterally. Normal hearing.   -- Neck: Normal range of motion. Neck supple. No meningismus. No adenopathy  -- Cardiac: Normal rate, regular rhythm and normal heart sounds. No carotid bruit. No lower extremity edema.  -- Pulmonary: Normal respiratory effort, breath sounds equal bilaterally. Adequate flow.  No wheezing.  No crackles.  -- Abdominal: Soft, no tenderness, no guarding, no rebound. Normal bowel sounds.   -- Musculoskeletal: Normal range of motion, all 4 extremities 5/5 strength.  Neurovascularly intact. Atraumatic. No deformities.  -- Neurological:  Cranial " nerves 2-12 grossly intact. No focal deficits.   -- Vascular: Posterior tibial, dorsalis pedis and radial pulses 2+ bilaterally    -- Lymphatics: No cervical or peripheral lymphadenopathy.   -- Skin: Warm and dry. No evidence of rash or cellulitis  -- Psychiatric: Normal mood and affect. Bedside behavior is appropriate.  Patient is cooperative.  Denies suicidal homicidal ideation.    Emergency Room Course     Treatment Course, Evaluation, and Medical Decision Making  1. Physical exam significant for dry mucous membranes   2. Triage glucose readings greater than 500   3. CBC/CMP with glucose over 700, sodium 120, hypochloremia, anion gap elevated  4. Beta hydroxybutyrate negative  5. Venous blood gas with pH 7.42  6. Normal saline 1 L   7. Urinalysis   8. Insulin 10 units IV    9. Will admit to ICU for [facility]      Abnormal lab values  Labs Reviewed   POCT GLUCOSE - Abnormal; Notable for the following components:       Result Value    POCT Glucose >500 (*)     All other components within normal limits       Medications Given  Medications - No data to display      Diagnosis  -- [facility]    Disposition and Plan  -- Disposition: admit to ICU-- Condition: stable         Judith Stanley MD  08/28/23 7117

## 2023-08-29 PROBLEM — N17.9 AKI (ACUTE KIDNEY INJURY): Status: ACTIVE | Noted: 2023-08-29

## 2023-08-29 PROBLEM — E11.10 METABOLIC ACIDOSIS DUE TO DIABETES MELLITUS: Status: ACTIVE | Noted: 2023-08-29

## 2023-08-29 PROBLEM — E11.00 TYPE 2 DIABETES MELLITUS WITH HYPEROSMOLAR HYPERGLYCEMIC STATE (HHS): Status: ACTIVE | Noted: 2018-08-07

## 2023-08-29 PROBLEM — Z87.891 FORMER SMOKER: Status: ACTIVE | Noted: 2018-08-07

## 2023-08-29 LAB
ALBUMIN SERPL BCP-MCNC: 3.1 G/DL (ref 3.5–5.2)
ALP SERPL-CCNC: 87 U/L (ref 55–135)
ALT SERPL W/O P-5'-P-CCNC: 71 U/L (ref 10–44)
ANION GAP SERPL CALC-SCNC: 12 MMOL/L (ref 8–16)
ANION GAP SERPL CALC-SCNC: 16 MMOL/L (ref 8–16)
ANION GAP SERPL CALC-SCNC: 18 MMOL/L (ref 8–16)
ANION GAP SERPL CALC-SCNC: 18 MMOL/L (ref 8–16)
ANION GAP SERPL CALC-SCNC: 19 MMOL/L (ref 8–16)
AST SERPL-CCNC: 30 U/L (ref 10–40)
BILIRUB SERPL-MCNC: 0.4 MG/DL (ref 0.1–1)
BUN SERPL-MCNC: 11 MG/DL (ref 6–20)
BUN SERPL-MCNC: 12 MG/DL (ref 6–20)
BUN SERPL-MCNC: 12 MG/DL (ref 6–20)
BUN SERPL-MCNC: 4 MG/DL (ref 6–20)
BUN SERPL-MCNC: 8 MG/DL (ref 6–20)
BUN SERPL-MCNC: 8 MG/DL (ref 6–20)
BUN SERPL-MCNC: 9 MG/DL (ref 6–20)
CALCIUM SERPL-MCNC: 8 MG/DL (ref 8.7–10.5)
CALCIUM SERPL-MCNC: 8.1 MG/DL (ref 8.7–10.5)
CALCIUM SERPL-MCNC: 8.1 MG/DL (ref 8.7–10.5)
CALCIUM SERPL-MCNC: 8.3 MG/DL (ref 8.7–10.5)
CALCIUM SERPL-MCNC: 8.4 MG/DL (ref 8.7–10.5)
CALCIUM SERPL-MCNC: 8.5 MG/DL (ref 8.7–10.5)
CALCIUM SERPL-MCNC: 9 MG/DL (ref 8.7–10.5)
CHLORIDE SERPL-SCNC: 100 MMOL/L (ref 95–110)
CHLORIDE SERPL-SCNC: 100 MMOL/L (ref 95–110)
CHLORIDE SERPL-SCNC: 101 MMOL/L (ref 95–110)
CHLORIDE SERPL-SCNC: 103 MMOL/L (ref 95–110)
CHLORIDE SERPL-SCNC: 98 MMOL/L (ref 95–110)
CHLORIDE SERPL-SCNC: 98 MMOL/L (ref 95–110)
CHLORIDE SERPL-SCNC: 99 MMOL/L (ref 95–110)
CO2 SERPL-SCNC: 10 MMOL/L (ref 23–29)
CO2 SERPL-SCNC: 10 MMOL/L (ref 23–29)
CO2 SERPL-SCNC: 11 MMOL/L (ref 23–29)
CO2 SERPL-SCNC: 11 MMOL/L (ref 23–29)
CO2 SERPL-SCNC: 12 MMOL/L (ref 23–29)
CO2 SERPL-SCNC: 15 MMOL/L (ref 23–29)
CO2 SERPL-SCNC: 17 MMOL/L (ref 23–29)
CREAT SERPL-MCNC: 0.7 MG/DL (ref 0.5–1.4)
CREAT SERPL-MCNC: 0.8 MG/DL (ref 0.5–1.4)
CREAT SERPL-MCNC: 0.8 MG/DL (ref 0.5–1.4)
EST. GFR  (NO RACE VARIABLE): >60 ML/MIN/1.73 M^2
ESTIMATED AVG GLUCOSE: 197 MG/DL (ref 68–131)
GLUCOSE SERPL-MCNC: 225 MG/DL (ref 70–110)
GLUCOSE SERPL-MCNC: 245 MG/DL (ref 70–110)
GLUCOSE SERPL-MCNC: 246 MG/DL (ref 70–110)
GLUCOSE SERPL-MCNC: 247 MG/DL (ref 70–110)
GLUCOSE SERPL-MCNC: 251 MG/DL (ref 70–110)
GLUCOSE SERPL-MCNC: 251 MG/DL (ref 70–110)
GLUCOSE SERPL-MCNC: 307 MG/DL (ref 70–110)
HBA1C MFR BLD: 8.5 % (ref 4–5.6)
PHOSPHATE SERPL-MCNC: 1.3 MG/DL (ref 2.7–4.5)
PHOSPHATE SERPL-MCNC: 1.7 MG/DL (ref 2.7–4.5)
PHOSPHATE SERPL-MCNC: 2 MG/DL (ref 2.7–4.5)
PHOSPHATE SERPL-MCNC: 2 MG/DL (ref 2.7–4.5)
PHOSPHATE SERPL-MCNC: 2.2 MG/DL (ref 2.7–4.5)
PHOSPHATE SERPL-MCNC: 2.5 MG/DL (ref 2.7–4.5)
POCT GLUCOSE: 211 MG/DL (ref 70–110)
POCT GLUCOSE: 211 MG/DL (ref 70–110)
POCT GLUCOSE: 217 MG/DL (ref 70–110)
POCT GLUCOSE: 218 MG/DL (ref 70–110)
POCT GLUCOSE: 220 MG/DL (ref 70–110)
POCT GLUCOSE: 222 MG/DL (ref 70–110)
POCT GLUCOSE: 225 MG/DL (ref 70–110)
POCT GLUCOSE: 231 MG/DL (ref 70–110)
POCT GLUCOSE: 232 MG/DL (ref 70–110)
POCT GLUCOSE: 234 MG/DL (ref 70–110)
POCT GLUCOSE: 234 MG/DL (ref 70–110)
POCT GLUCOSE: 237 MG/DL (ref 70–110)
POCT GLUCOSE: 239 MG/DL (ref 70–110)
POCT GLUCOSE: 243 MG/DL (ref 70–110)
POCT GLUCOSE: 250 MG/DL (ref 70–110)
POCT GLUCOSE: 259 MG/DL (ref 70–110)
POTASSIUM SERPL-SCNC: 3.5 MMOL/L (ref 3.5–5.1)
POTASSIUM SERPL-SCNC: 3.6 MMOL/L (ref 3.5–5.1)
POTASSIUM SERPL-SCNC: 3.7 MMOL/L (ref 3.5–5.1)
POTASSIUM SERPL-SCNC: 3.9 MMOL/L (ref 3.5–5.1)
PROT SERPL-MCNC: 6.7 G/DL (ref 6–8.4)
SODIUM SERPL-SCNC: 127 MMOL/L (ref 136–145)
SODIUM SERPL-SCNC: 128 MMOL/L (ref 136–145)
SODIUM SERPL-SCNC: 129 MMOL/L (ref 136–145)
SODIUM SERPL-SCNC: 129 MMOL/L (ref 136–145)
SODIUM SERPL-SCNC: 130 MMOL/L (ref 136–145)
SODIUM SERPL-SCNC: 131 MMOL/L (ref 136–145)
SODIUM SERPL-SCNC: 132 MMOL/L (ref 136–145)

## 2023-08-29 PROCEDURE — 25000003 PHARM REV CODE 250: Performed by: INTERNAL MEDICINE

## 2023-08-29 PROCEDURE — 80048 BASIC METABOLIC PNL TOTAL CA: CPT | Mod: 91,XB | Performed by: INTERNAL MEDICINE

## 2023-08-29 PROCEDURE — 80053 COMPREHEN METABOLIC PANEL: CPT | Performed by: PHYSICIAN ASSISTANT

## 2023-08-29 PROCEDURE — 99291 PR CRITICAL CARE, E/M 30-74 MINUTES: ICD-10-PCS | Mod: ,,, | Performed by: PHYSICIAN ASSISTANT

## 2023-08-29 PROCEDURE — 94761 N-INVAS EAR/PLS OXIMETRY MLT: CPT

## 2023-08-29 PROCEDURE — 63600175 PHARM REV CODE 636 W HCPCS: Performed by: PHYSICIAN ASSISTANT

## 2023-08-29 PROCEDURE — 84100 ASSAY OF PHOSPHORUS: CPT | Performed by: EMERGENCY MEDICINE

## 2023-08-29 PROCEDURE — 99291 CRITICAL CARE FIRST HOUR: CPT | Mod: ,,, | Performed by: PHYSICIAN ASSISTANT

## 2023-08-29 PROCEDURE — 63600175 PHARM REV CODE 636 W HCPCS: Performed by: INTERNAL MEDICINE

## 2023-08-29 PROCEDURE — 36415 COLL VENOUS BLD VENIPUNCTURE: CPT | Performed by: EMERGENCY MEDICINE

## 2023-08-29 PROCEDURE — 11000001 HC ACUTE MED/SURG PRIVATE ROOM

## 2023-08-29 PROCEDURE — 97161 PT EVAL LOW COMPLEX 20 MIN: CPT

## 2023-08-29 PROCEDURE — 36415 COLL VENOUS BLD VENIPUNCTURE: CPT | Performed by: PHYSICIAN ASSISTANT

## 2023-08-29 RX ORDER — INSULIN ASPART 100 [IU]/ML
0-5 INJECTION, SOLUTION INTRAVENOUS; SUBCUTANEOUS
Status: DISCONTINUED | OUTPATIENT
Start: 2023-08-29 | End: 2023-08-30

## 2023-08-29 RX ORDER — GLUCAGON 1 MG
1 KIT INJECTION
Status: DISCONTINUED | OUTPATIENT
Start: 2023-08-29 | End: 2023-08-30

## 2023-08-29 RX ORDER — IBUPROFEN 200 MG
16 TABLET ORAL
Status: DISCONTINUED | OUTPATIENT
Start: 2023-08-29 | End: 2023-08-30

## 2023-08-29 RX ORDER — IBUPROFEN 200 MG
24 TABLET ORAL
Status: DISCONTINUED | OUTPATIENT
Start: 2023-08-29 | End: 2023-08-30

## 2023-08-29 RX ADMIN — ENOXAPARIN SODIUM 40 MG: 40 INJECTION SUBCUTANEOUS at 04:08

## 2023-08-29 RX ADMIN — SODIUM CHLORIDE 1000 ML: 9 INJECTION, SOLUTION INTRAVENOUS at 03:08

## 2023-08-29 RX ADMIN — MUPIROCIN: 20 OINTMENT TOPICAL at 08:08

## 2023-08-29 RX ADMIN — INSULIN ASPART 1 UNITS: 100 INJECTION, SOLUTION INTRAVENOUS; SUBCUTANEOUS at 08:08

## 2023-08-29 RX ADMIN — CYCLOBENZAPRINE HYDROCHLORIDE 5 MG: 5 TABLET, FILM COATED ORAL at 06:08

## 2023-08-29 NOTE — ASSESSMENT & PLAN NOTE
Patient with acute kidney injury/acute renal failure likely due to pre-renal azotemia due to dehydration POLY is currently improving. Baseline creatinine 0.8 - Labs reviewed- Renal function/electrolytes with Estimated Creatinine Clearance: 104.4 mL/min (based on SCr of 0.7 mg/dL). according to latest data. Monitor urine output and serial BMP and adjust therapy as needed. Avoid nephrotoxins and renally dose meds for GFR listed above.

## 2023-08-29 NOTE — H&P
[facility] Medicine  History & Physical    Patient Name: Kiana Hardy  MRN: [id number]  Patient Class: IP- Inpatient  Admission Date: 2023  Attending Physician: Clyde Jenkins MD   Primary Care Provider: Grady Billings MD         Patient information was obtained from patient and ER records.     Subjective:     Principal Problem:Type 2 diabetes mellitus with hyperosmolar hyperglycemic state (HHS)    Chief Complaint:   Chief Complaint   Patient presents with    Hyperglycemia        HPI: Patient is a 38 year old female with medical history of DM type 2 on metformin and former tobacco use disorder who presented to the ED with polyuria.  Patient states on Thursday she noticed xerostomia.  Then on Saturday she went to urgent care for neck and should pain and was started on po steroids. Yesterday she felt fatigued, noticed polyuria and hyperglycemia.  She says she has not been follow up with her PCP or taking good care of herself.  She only sees a chiropractor for the past 5 months for neck and shoulder pain.  She denies fever, chills, CP, SOB, but had nausea and vomiting that is now resolved.        Admitted for HHS.               Past Medical History:   Diagnosis Date    Diabetes mellitus     Thyroid disease     hypothyroidism    Thyroid disease during pregnancy, second trimester 10/18/2019       Past Surgical History:   Procedure Laterality Date     SECTION N/A 2020    Procedure: REPEAT  SECTION;  Surgeon: Onelia Crespo MD;  Location: [facility];  Service: OB/GYN;  Laterality: N/A;    CHOLECYSTECTOMY      DILATION AND CURETTAGE OF UTERUS      FOOT SURGERY Right     has a nail in the foot    SALPINGECTOMY Left 2020    Procedure: SALPINGECTOMY;  Surgeon: Onelia Crespo MD;  Location: [facility];  Service: OB/GYN;  Laterality: Left;    SALPINGOOPHORECTOMY Right 2019    Procedure: RIGHT SALPINGO-OOPHORECTOMY USO;  Surgeon: Mary  "BRIDGETTE Iglesias MD;  Location: [facility] OR;  Service: OB/GYN;  Laterality: Right;    TONSILLECTOMY  1995    TUBAL LIGATION         Review of patient's allergies indicates:  No Known Allergies    No current facility-administered medications on file prior to encounter.     Current Outpatient Medications on File Prior to Encounter   Medication Sig    cyclobenzaprine (FLEXERIL) 10 MG tablet Take 10 mg by mouth every 8 (eight) hours.    diclofenac (CATAFLAM) 50 MG tablet Take 50 mg by mouth every 8 (eight) hours as needed (pain). x7 days    predniSONE (DELTASONE) 50 MG Tab Take 50 mg by mouth once daily.    aspirin (ECOTRIN) 81 MG EC tablet Take 81 mg by mouth once daily.    BD INSULIN SYRINGE ULTRA-FINE 0.5 mL 31 gauge x 5/16" Syrg Inject 1 Syringe into the skin once daily.    blood sugar diagnostic (RELION PRIME TEST STRIPS) Strp 1 strip by Misc.(Non-Drug; Combo Route) route 4 (four) times daily.    insulin syringe-needle U-100 (BD INSULIN SYRINGE ULTRA-FINE) 0.3 mL 31 gauge x 5/16" Syrg Use in the evening as directed.    metFORMIN (GLUCOPHAGE) 1000 MG tablet Take 1 tablet (1,000 mg total) by mouth 2 (two) times daily with meals. (Patient not taking: Reported on 8/28/2023)    pen needle, diabetic 29 gauge x 1/2" Ndle Use four times daily with insulin pens    TRUE METRIX GLUCOSE TEST STRIP Strp     TRUEPLUS LANCETS 28 gauge Misc     [DISCONTINUED] HUMALOG U-100 INSULIN 100 unit/mL injection Inject 5 Units into the skin 2 (two) times daily.      Family History       Problem Relation (Age of Onset)    Breast cancer Maternal Grandmother    COPD Mother    Diabetes Father          Tobacco Use    Smoking status: Former     Current packs/day: 0.50     Average packs/day: 0.5 packs/day for 23.0 years (11.5 ttl pk-yrs)     Types: Cigarettes    Smokeless tobacco: Never   Substance and Sexual Activity    Alcohol use: Not Currently    Drug use: Never     Types: Methamphetamines, Marijuana     Comment: last use mauijuana & " meth 2019    Sexual activity: Yes     Partners: Male     Birth control/protection: See Surgical Hx     Comment:      Review of Systems   Constitutional:  Positive for fatigue. Negative for chills and fever.   HENT:  Negative for ear discharge and ear pain.    Eyes:  Negative for pain and discharge.   Respiratory:  Negative for cough and shortness of breath.    Cardiovascular:  Negative for chest pain and leg swelling.   Gastrointestinal:  Positive for nausea (resolved) and vomiting (resolved).   Endocrine: Positive for polydipsia and polyuria. Negative for cold intolerance and heat intolerance.   Genitourinary:  Negative for difficulty urinating and dysuria.   Musculoskeletal:  Negative for joint swelling and myalgias.   Skin:  Negative for rash and wound.   Neurological:  Negative for dizziness and headaches.   Psychiatric/Behavioral:  Negative for agitation and confusion.      Objective:     Vital Signs (Most Recent):  Temp: 98.4 °F (36.9 °C) (08/29/23 1103)  Pulse: 79 (08/29/23 1103)  Resp: 18 (08/29/23 1103)  BP: 136/87 (08/29/23 1103)  SpO2: 99 % (08/29/23 1103) Vital Signs (24h Range):  Temp:  [97.2 °F (36.2 °C)-98.4 °F (36.9 °C)] 98.4 °F (36.9 °C)  Pulse:  [] 79  Resp:  [12-36] 18  SpO2:  [93 %-99 %] 99 %  BP: (103-197)/(55-97) 136/87     Weight: 72.7 kg (160 lb 4.4 oz)  Body mass index is 27.51 kg/m².     Physical Exam  Constitutional:       General: She is not in acute distress.  HENT:      Head: Normocephalic and atraumatic.   Eyes:      General:         Right eye: No discharge.         Left eye: No discharge.   Cardiovascular:      Rate and Rhythm: Normal rate and regular rhythm.   Pulmonary:      Effort: Pulmonary effort is normal.      Breath sounds: Normal breath sounds.   Abdominal:      General: There is no distension.      Tenderness: There is no abdominal tenderness.   Musculoskeletal:         General: No swelling or tenderness.      Cervical back: Neck supple. No tenderness.  "  Skin:     General: Skin is warm and dry.   Neurological:      General: No focal deficit present.      Mental Status: She is alert and oriented to person, place, and time.   Psychiatric:         Mood and Affect: Mood normal.         Behavior: Behavior normal.                Significant Labs: A1C:   Recent Labs   Lab 08/28/23 1732   HGBA1C 8.5*     ABGs: No results for input(s): "PH", "PCO2", "HCO3", "POCSATURATED", "BE", "TOTALHB", "COHB", "METHB", "O2HB", "POCFIO2", "PO2" in the last 48 hours.  Bilirubin:   Recent Labs   Lab 08/28/23  1546 08/28/23 1922   BILITOT 0.3 0.2     Blood Culture: No results for input(s): "LABBLOO" in the last 48 hours.  BMP:   Recent Labs   Lab 08/28/23  1732 08/28/23 1922 08/29/23 0813   GLU  --    < > 246*   NA  --    < > 132*   K  --    < > 3.9   CL  --    < > 103   CO2  --    < > 10*   BUN  --    < > 11   CREATININE  --    < > 0.7   CALCIUM  --    < > 8.0*   MG 2.0  --   --     < > = values in this interval not displayed.     CBC:   Recent Labs   Lab 08/28/23  1546   WBC 10.41   HGB 15.4   HCT 42.7        CMP:   Recent Labs   Lab 08/28/23  1546 08/28/23  1922 08/28/23  2356 08/29/23  0411 08/29/23  0813   * 124* 128* 127* 132*   K 5.0 4.2 3.5 3.5 3.9   CL 87* 93* 98 98 103   CO2 7* 12* 11* 10* 10*   * 351* 247* 251* 246*   BUN 20 16 12 8 11   CREATININE 1.3 0.8 0.7 0.7 0.7   CALCIUM 10.2 10.0 8.5* 8.4* 8.0*   PROT 8.8* 8.8*  --   --   --    ALBUMIN 4.0 3.8  --   --   --    BILITOT 0.3 0.2  --   --   --    ALKPHOS 170* 142*  --   --   --    AST 46* 35  --   --   --    * 97*  --   --   --    ANIONGAP 26* 19* 19* 19* 19*     Cardiac Markers: No results for input(s): "CKMB", "MYOGLOBIN", "BNP", "TROPISTAT" in the last 48 hours.  Coagulation: No results for input(s): "PT", "INR", "APTT" in the last 48 hours.  Lactic Acid: No results for input(s): "LACTATE" in the last 48 hours.  Lipase: No results for input(s): "LIPASE" in the last 48 hours.  Lipid Panel: No " "results for input(s): "CHOL", "HDL", "LDLCALC", "TRIG", "CHOLHDL" in the last 48 hours.  Magnesium:   Recent Labs   Lab 08/28/23  1732   MG 2.0     POCT Glucose:   Recent Labs   Lab 08/29/23  0916 08/29/23  1002 08/29/23  1113   POCTGLUCOSE 220* 222* 231*     Prealbumin: No results for input(s): "PREALBUMIN" in the last 48 hours.  Respiratory Culture: No results for input(s): "GSRESP", "RESPIRATORYC" in the last 48 hours.  Troponin: No results for input(s): "TROPONINI", "TROPONINIHS" in the last 48 hours.  TSH: No results for input(s): "TSH" in the last 4320 hours.  Urine Culture: No results for input(s): "LABURIN" in the last 48 hours.  Urine Studies:   Recent Labs   Lab 08/28/23  1555   COLORU Yellow   APPEARANCEUA Clear   PHUR 6.0   SPECGRAV 1.010   PROTEINUA Negative   GLUCUA 3+*   KETONESU Trace*   BILIRUBINUA Negative   OCCULTUA 3+*   NITRITE Negative   UROBILINOGEN Negative   LEUKOCYTESUR Negative   RBCUA 5*   BACTERIA Few*       Significant Imaging: I have reviewed all pertinent imaging results/findings within the past 24 hours.    Assessment/Plan:     * Type 2 diabetes mellitus with hyperosmolar hyperglycemic state (HHS)  Patient's FSGs are controlled on current medication regimen.  Last A1c reviewed-   Lab Results   Component Value Date    HGBA1C 8.5 (H) 08/28/2023     Most recent fingerstick glucose reviewed-   Recent Labs   Lab 08/29/23  0810 08/29/23  0916 08/29/23  1002 08/29/23  1113   POCTGLUCOSE 232* 220* 222* 231*     Current correctional scale on insulin gtt   Maintain anti-hyperglycemic dose as follows-   Antihyperglycemics (From admission, onward)    Start     Stop Route Frequency Ordered    08/28/23 1915  insulin regular in 0.9 % NaCl 100 unit/100 mL (1 unit/mL) infusion        Question Answer Comment   Insulin Rate Adjustment (DO NOT MODIFY ANSWER) \\[email or web address]\[email or web address]\Images\Pharmacy\InsulinInfusions\INSULIN ADJUSTMENT [facility] version [id number].pdf    Initial dose (DO NOT CHANGE): 0.1 units/kg/hr     "    -- IV Continuous 08/28/23 1902        Hold Oral hypoglycemics while patient is in the hospital.    Beta hydroxybutyrate negative, trace ketones  Waiting for closure of anion gap and will start SC insulin and diet.  Likely triggered by steroid use.       Metabolic acidosis due to diabetes mellitus  Anion gap elevated at admission and slowly improving   Will monitor and once closed will transition from insulin gtt to sc insulin.      POLY (acute kidney injury)  Patient with acute kidney injury/acute renal failure likely due to pre-renal azotemia due to dehydration POLY is currently improving. Baseline creatinine 0.8 - Labs reviewed- Renal function/electrolytes with Estimated Creatinine Clearance: 104.4 mL/min (based on SCr of 0.7 mg/dL). according to latest data. Monitor urine output and serial BMP and adjust therapy as needed. Avoid nephrotoxins and renally dose meds for GFR listed above.    Former smoker  In remission         VTE Risk Mitigation (From admission, onward)         Ordered     enoxaparin injection 40 mg  Every 24 hours         08/28/23 2040     IP VTE HIGH RISK PATIENT  Once         08/28/23 2105     Place sequential compression device  Until discontinued         08/28/23 2105     Place sequential compression device  Until discontinued         08/28/23 1902     Place sequential compression device  Until discontinued         08/28/23 1853     Place sequential compression device  Until discontinued         08/28/23 1853              Critical care time spent on the evaluation and treatment of severe organ dysfunction, review of pertinent labs and imaging studies, discussions with consulting providers and discussions with patient/family: 35 minutes.             Misty Pack PA-C  Department of Hospital Medicine  [facility] - Intensive Care

## 2023-08-29 NOTE — ASSESSMENT & PLAN NOTE
Patient's FSGs are controlled on current medication regimen.  Last A1c reviewed-   Lab Results   Component Value Date    HGBA1C 8.5 (H) 08/28/2023     Most recent fingerstick glucose reviewed-   Recent Labs   Lab 08/29/23  0810 08/29/23  0916 08/29/23  1002 08/29/23  1113   POCTGLUCOSE 232* 220* 222* 231*     Current correctional scale on insulin gtt   Maintain anti-hyperglycemic dose as follows-   Antihyperglycemics (From admission, onward)    Start     Stop Route Frequency Ordered    08/28/23 1915  insulin regular in 0.9 % NaCl 100 unit/100 mL (1 unit/mL) infusion        Question Answer Comment   Insulin Rate Adjustment (DO NOT MODIFY ANSWER) \\[email or web address]\epic\Images\Pharmacy\InsulinInfusions\INSULIN ADJUSTMENT [facility] version [id number].pdf    Initial dose (DO NOT CHANGE): 0.1 units/kg/hr        -- IV Continuous 08/28/23 1902        Hold Oral hypoglycemics while patient is in the hospital.    Beta hydroxybutyrate negative, trace ketones  Waiting for closure of anion gap and will start SC insulin and diet.  Likely triggered by steroid use.

## 2023-08-29 NOTE — PHARMACY MED REC
"  [facility]           Pharmacy  Admission Medication History     The home medication history was taken by Lolita Holland.      Medication history obtained from Medications listed below were obtained from: [employer] software- [employer]    Based on information gathered for medication list, you may go to "Admission" then "Reconcile Home Medications" tabs to review and/or act upon those items.     The home medication list has been updated by the Pharmacy department.   Please read ALL comments highlighted in yellow.   Please address this information as you see fit.    Feel free to contact us if you have any questions or require assistance.    The medications listed below were removed from the home medication list.  Please reorder if appropriate:    Patient reports NOT TAKING the following medication(s):  Lantus u 100  Amoxil 875mg  Norco 7.5-325mg  Ibuprofen 800mg        No current facility-administered medications on file prior to encounter.     Current Outpatient Medications on File Prior to Encounter   Medication Sig Dispense Refill    cyclobenzaprine (FLEXERIL) 10 MG tablet Take 10 mg by mouth every 8 (eight) hours.      diclofenac (CATAFLAM) 50 MG tablet Take 50 mg by mouth every 8 (eight) hours as needed (pain). x7 days      predniSONE (DELTASONE) 50 MG Tab Take 50 mg by mouth once daily.      aspirin (ECOTRIN) 81 MG EC tablet Take 81 mg by mouth once daily.      BD INSULIN SYRINGE ULTRA-FINE 0.5 mL 31 gauge x 5/16" Syrg Inject 1 Syringe into the skin once daily.      blood sugar diagnostic (RELION PRIME TEST STRIPS) Strp 1 strip by Misc.(Non-Drug; Combo Route) route 4 (four) times daily. 200 strip 0    insulin syringe-needle U-100 (BD INSULIN SYRINGE ULTRA-FINE) 0.3 mL 31 gauge x 5/16" Syrg Use in the evening as directed. 100 each 11    metFORMIN (GLUCOPHAGE) 1000 MG tablet Take 1 tablet (1,000 mg total) by mouth 2 (two) times daily with meals. (Patient not taking: Reported on 8/28/2023) 60 " "tablet 11    pen needle, diabetic 29 gauge x 1/2" Ndle Use four times daily with insulin pens 100 each 11    TRUE METRIX GLUCOSE TEST STRIP Strp       TRUEPLUS LANCETS 28 gauge Misc          Please address this information as you see fit.  Feel free to contact us if you have any questions or require assistance.    Lolita Holland  [phone]                .          "

## 2023-08-29 NOTE — HPI
Patient is a 38 year old female with medical history of DM type 2 on metformin and former tobacco use disorder who presented to the ED with polyuria.  Patient states on Thursday she noticed xerostomia.  Then on Saturday she went to urgent care for neck and should pain and was started on po steroids. Yesterday she felt fatigued, noticed polyuria and hyperglycemia.  She says she has not been follow up with her PCP or taking good care of herself.  She only sees a chiropractor for the past 5 months for neck and shoulder pain.  She denies fever, chills, CP, SOB, but had nausea and vomiting that is now resolved.        Admitted for [facility].

## 2023-08-29 NOTE — ASSESSMENT & PLAN NOTE
Anion gap elevated at admission and slowly improving   Will monitor and once closed will transition from insulin gtt to sc insulin.

## 2023-08-29 NOTE — ED NOTES
Pt sitting up on stretcher - talking to family member. RR even and unlabored. NADN. Denies any complaints at this time. Updated on POC and expected wait times, v/u. No needs at this time, encouraged to call for needs. Awaiting blood draw and pending admission to ICU.

## 2023-08-29 NOTE — EICU
Brief Note     37 yr old female   HHS   Pseudohyponatremia     Insulin infusion   IVF   Fingerstick q 1 hr   BMP q 4 hrs   DVT prophylaxis

## 2023-08-29 NOTE — SUBJECTIVE & OBJECTIVE
"Past Medical History:   Diagnosis Date    Diabetes mellitus     Thyroid disease     hypothyroidism    Thyroid disease during pregnancy, second trimester 10/18/2019       Past Surgical History:   Procedure Laterality Date     SECTION N/A 2020    Procedure: REPEAT  SECTION;  Surgeon: Onelia Crespo MD;  Location: [facility];  Service: OB/GYN;  Laterality: N/A;    CHOLECYSTECTOMY      DILATION AND CURETTAGE OF UTERUS      FOOT SURGERY Right     has a nail in the foot    SALPINGECTOMY Left 2020    Procedure: SALPINGECTOMY;  Surgeon: Onelia Crespo MD;  Location: [facility];  Service: OB/GYN;  Laterality: Left;    SALPINGOOPHORECTOMY Right 2019    Procedure: RIGHT SALPINGO-OOPHORECTOMY USO;  Surgeon: Mary Iglesias MD;  Location: [facility];  Service: OB/GYN;  Laterality: Right;    TONSILLECTOMY      TUBAL LIGATION         Review of patient's allergies indicates:  No Known Allergies    No current facility-administered medications on file prior to encounter.     Current Outpatient Medications on File Prior to Encounter   Medication Sig    cyclobenzaprine (FLEXERIL) 10 MG tablet Take 10 mg by mouth every 8 (eight) hours.    diclofenac (CATAFLAM) 50 MG tablet Take 50 mg by mouth every 8 (eight) hours as needed (pain). x7 days    predniSONE (DELTASONE) 50 MG Tab Take 50 mg by mouth once daily.    aspirin (ECOTRIN) 81 MG EC tablet Take 81 mg by mouth once daily.    BD INSULIN SYRINGE ULTRA-FINE 0.5 mL 31 gauge x 5/16" Syrg Inject 1 Syringe into the skin once daily.    blood sugar diagnostic (RELION PRIME TEST STRIPS) Strp 1 strip by Misc.(Non-Drug; Combo Route) route 4 (four) times daily.    insulin syringe-needle U-100 (BD INSULIN SYRINGE ULTRA-FINE) 0.3 mL 31 gauge x 5/16" Syrg Use in the evening as directed.    metFORMIN (GLUCOPHAGE) 1000 MG tablet Take 1 tablet (1,000 mg total) by mouth 2 (two) times daily with meals. (Patient not taking: Reported on 2023)    pen needle, " "diabetic 29 gauge x 1/2" Ndle Use four times daily with insulin pens    TRUE METRIX GLUCOSE TEST STRIP Strp     TRUEPLUS LANCETS 28 gauge Misc     [DISCONTINUED] HUMALOG U-100 INSULIN 100 unit/mL injection Inject 5 Units into the skin 2 (two) times daily.      Family History       Problem Relation (Age of Onset)    Breast cancer Maternal Grandmother    COPD Mother    Diabetes Father          Tobacco Use    Smoking status: Former     Current packs/day: 0.50     Average packs/day: 0.5 packs/day for 23.0 years (11.5 ttl pk-yrs)     Types: Cigarettes    Smokeless tobacco: Never   Substance and Sexual Activity    Alcohol use: Not Currently    Drug use: Never     Types: Methamphetamines, Marijuana     Comment: last use mauijuana & meth 2019    Sexual activity: Yes     Partners: Male     Birth control/protection: See Surgical Hx     Comment:      Review of Systems   Constitutional:  Positive for fatigue. Negative for chills and fever.   HENT:  Negative for ear discharge and ear pain.    Eyes:  Negative for pain and discharge.   Respiratory:  Negative for cough and shortness of breath.    Cardiovascular:  Negative for chest pain and leg swelling.   Gastrointestinal:  Positive for nausea (resolved) and vomiting (resolved).   Endocrine: Positive for polydipsia and polyuria. Negative for cold intolerance and heat intolerance.   Genitourinary:  Negative for difficulty urinating and dysuria.   Musculoskeletal:  Negative for joint swelling and myalgias.   Skin:  Negative for rash and wound.   Neurological:  Negative for dizziness and headaches.   Psychiatric/Behavioral:  Negative for agitation and confusion.      Objective:     Vital Signs (Most Recent):  Temp: 98.4 °F (36.9 °C) (08/29/23 1103)  Pulse: 79 (08/29/23 1103)  Resp: 18 (08/29/23 1103)  BP: 136/87 (08/29/23 1103)  SpO2: 99 % (08/29/23 1103) Vital Signs (24h Range):  Temp:  [97.2 °F (36.2 °C)-98.4 °F (36.9 °C)] 98.4 °F (36.9 °C)  Pulse:  [] 79  Resp:  " "[12-36] 18  SpO2:  [93 %-99 %] 99 %  BP: (103-197)/(55-97) 136/87     Weight: 72.7 kg (160 lb 4.4 oz)  Body mass index is 27.51 kg/m².     Physical Exam  Constitutional:       General: She is not in acute distress.  HENT:      Head: Normocephalic and atraumatic.   Eyes:      General:         Right eye: No discharge.         Left eye: No discharge.   Cardiovascular:      Rate and Rhythm: Normal rate and regular rhythm.   Pulmonary:      Effort: Pulmonary effort is normal.      Breath sounds: Normal breath sounds.   Abdominal:      General: There is no distension.      Tenderness: There is no abdominal tenderness.   Musculoskeletal:         General: No swelling or tenderness.      Cervical back: Neck supple. No tenderness.   Skin:     General: Skin is warm and dry.   Neurological:      General: No focal deficit present.      Mental Status: She is alert and oriented to person, place, and time.   Psychiatric:         Mood and Affect: Mood normal.         Behavior: Behavior normal.                Significant Labs: A1C:   Recent Labs   Lab 08/28/23 1732   HGBA1C 8.5*     ABGs: No results for input(s): "PH", "PCO2", "HCO3", "POCSATURATED", "BE", "TOTALHB", "COHB", "METHB", "O2HB", "POCFIO2", "PO2" in the last 48 hours.  Bilirubin:   Recent Labs   Lab 08/28/23  1546 08/28/23 1922   BILITOT 0.3 0.2     Blood Culture: No results for input(s): "LABBLOO" in the last 48 hours.  BMP:   Recent Labs   Lab 08/28/23  1732 08/28/23 1922 08/29/23  0813   GLU  --    < > 246*   NA  --    < > 132*   K  --    < > 3.9   CL  --    < > 103   CO2  --    < > 10*   BUN  --    < > 11   CREATININE  --    < > 0.7   CALCIUM  --    < > 8.0*   MG 2.0  --   --     < > = values in this interval not displayed.     CBC:   Recent Labs   Lab 08/28/23  1546   WBC 10.41   HGB 15.4   HCT 42.7        CMP:   Recent Labs   Lab 08/28/23  1546 08/28/23  1922 08/28/23  2356 08/29/23  0411 08/29/23  0813   * 124* 128* 127* 132*   K 5.0 4.2 3.5 3.5 3.9 " "  CL 87* 93* 98 98 103   CO2 7* 12* 11* 10* 10*   * 351* 247* 251* 246*   BUN 20 16 12 8 11   CREATININE 1.3 0.8 0.7 0.7 0.7   CALCIUM 10.2 10.0 8.5* 8.4* 8.0*   PROT 8.8* 8.8*  --   --   --    ALBUMIN 4.0 3.8  --   --   --    BILITOT 0.3 0.2  --   --   --    ALKPHOS 170* 142*  --   --   --    AST 46* 35  --   --   --    * 97*  --   --   --    ANIONGAP 26* 19* 19* 19* 19*     Cardiac Markers: No results for input(s): "CKMB", "MYOGLOBIN", "BNP", "TROPISTAT" in the last 48 hours.  Coagulation: No results for input(s): "PT", "INR", "APTT" in the last 48 hours.  Lactic Acid: No results for input(s): "LACTATE" in the last 48 hours.  Lipase: No results for input(s): "LIPASE" in the last 48 hours.  Lipid Panel: No results for input(s): "CHOL", "HDL", "LDLCALC", "TRIG", "CHOLHDL" in the last 48 hours.  Magnesium:   Recent Labs   Lab 08/28/23  1732   MG 2.0     POCT Glucose:   Recent Labs   Lab 08/29/23  0916 08/29/23  1002 08/29/23  1113   POCTGLUCOSE 220* 222* 231*     Prealbumin: No results for input(s): "PREALBUMIN" in the last 48 hours.  Respiratory Culture: No results for input(s): "GSRESP", "RESPIRATORYC" in the last 48 hours.  Troponin: No results for input(s): "TROPONINI", "TROPONINIHS" in the last 48 hours.  TSH: No results for input(s): "TSH" in the last 4320 hours.  Urine Culture: No results for input(s): "LABURIN" in the last 48 hours.  Urine Studies:   Recent Labs   Lab 08/28/23  1555   COLORU Yellow   APPEARANCEUA Clear   PHUR 6.0   SPECGRAV 1.010   PROTEINUA Negative   GLUCUA 3+*   KETONESU Trace*   BILIRUBINUA Negative   OCCULTUA 3+*   NITRITE Negative   UROBILINOGEN Negative   LEUKOCYTESUR Negative   RBCUA 5*   BACTERIA Few*       Significant Imaging: I have reviewed all pertinent imaging results/findings within the past 24 hours.  "
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
## 2023-08-29 NOTE — NURSING
Pt transported from ICU. Reviewed POC. VSS. Pt oriented to room. Flexeril given for c/o shoulder pain.

## 2023-08-29 NOTE — EICU
Intervention Initiated From:  [facility] / EICU    Jeannine intervened regarding:  Rounding (Video assessment)    Nurse Notified:  No    Doctor Notified:  Yes    Comments: Video rounds complete. Patient sitting up in bed with bedside nurse admitting patient to unit. RICCÉSAR BAEZ notified of patient arrival to unit. Denies eICU needs at this time.

## 2023-08-29 NOTE — PLAN OF CARE
[facility] - Intensive Care  Initial Discharge Assessment       Primary Care Provider: Grady Billings MD    Admission Diagnosis: Hyperosmolar hyperglycemic state (HHS) [E11.00]    Admission Date: 8/28/2023  Expected Discharge Date:          Payor: MEDICAID / Plan: [facility] COMMUNITY PLAN [employer] ([place] MEDICA[place]) / Product Type: Managed Medicaid /     Extended Emergency Contact Information  Primary Emergency Contact: Eloy Hardy   [place]  Mobile Phone: [phone]  Relation: Father    Discharge Plan A: (P) Home with family, Home  Discharge Plan B: (P) Home      [facility] [place] - [address]  [id number]  [place] [address]  Phone: [phone] Fax: [phone]    [facility] [address] [place] [address]  Phone: [phone] Fax: [phone]    [id number] - [place] - [address]  [address]  [place] [address]  Phone: [phone] Fax: [phone]      Initial Assessment (most recent)       Adult Discharge Assessment - 08/29/23 1249          Discharge Assessment    Assessment Type Discharge Planning Assessment     Confirmed/corrected address, phone number and insurance Yes     Confirmed Demographics Correct on Facesheet     Source of Information family     If unable to respond/provide information was family/caregiver contacted? --   Spoke with father Eloy    Reason For Admission Hyperosmolar Hyperglycemia     People in Home alone     Do you expect to return to your current living situation? Yes     Do you have help at home or someone to help you manage your care at home? Yes     Who are your caregiver(s) and their phone number(s)? Father Eloy [phone]     Current cognitive status: Alert/Oriented     Equipment Currently Used at Home none     Readmission within 30 days? No     Patient currently being followed by outpatient case management? No     Do you currently have service(s) that help you manage your care at home? No     Do you  take prescription medications? Yes     Do you have prescription coverage? Yes     Coverage [facility] Plan     Do you have any problems affording any of your prescribed medications? No     Is the patient taking medications as prescribed? yes     Who is going to help you get home at discharge? self or father Eloy     Are you on dialysis? No (P)      DME Needed Upon Discharge  none (P)      Discharge Plan discussed with: Parent(s) (P)      Name(s) and Number(s) Eloy [phone] (P)      Discharge Plan A Home with family;Home (P)      Discharge Plan B Home (P)                         CM attempted to call patient. Unable to reach and unable to leave message. Phone father Eloy to verify information. CM will continue to try and reach patient

## 2023-08-29 NOTE — NURSING
2000 Received report from Monica on a 37 year old female with diagnosis DKA. Awake, alert, and oriented times 4. Calm and cooperative. Oriented to ICU and discussed plan of care with patient. NS bolus in progress. Continuous cardiac monitoring in progress HR 73 NSR. Sat 97% on room air. MAEW. Due to void. Will continue to monitor. Side rails times 2 up, call button in reach, and bed low and locked.    0600 Patient slept throughout the night without complications. Remains on insulin infusion with hourly accu checks.

## 2023-08-30 LAB
ALLENS TEST: ABNORMAL
ANION GAP SERPL CALC-SCNC: 18 MMOL/L (ref 8–16)
ANION GAP SERPL CALC-SCNC: 18 MMOL/L (ref 8–16)
ANION GAP SERPL CALC-SCNC: 19 MMOL/L (ref 8–16)
ANION GAP SERPL CALC-SCNC: 20 MMOL/L (ref 8–16)
ANION GAP SERPL CALC-SCNC: 23 MMOL/L (ref 8–16)
B-OH-BUTYR BLD STRIP-SCNC: 0.1 MMOL/L (ref 0–0.5)
BASOPHILS # BLD AUTO: 0.06 K/UL (ref 0–0.2)
BASOPHILS NFR BLD: 0.6 % (ref 0–1.9)
BILIRUB UR QL STRIP: NEGATIVE
BUN SERPL-MCNC: 10 MG/DL (ref 6–20)
BUN SERPL-MCNC: 11 MG/DL (ref 6–20)
BUN SERPL-MCNC: 13 MG/DL (ref 6–20)
BUN SERPL-MCNC: 14 MG/DL (ref 6–20)
BUN SERPL-MCNC: 8 MG/DL (ref 6–20)
CALCIUM SERPL-MCNC: 8.6 MG/DL (ref 8.7–10.5)
CALCIUM SERPL-MCNC: 8.6 MG/DL (ref 8.7–10.5)
CALCIUM SERPL-MCNC: 8.8 MG/DL (ref 8.7–10.5)
CALCIUM SERPL-MCNC: 8.9 MG/DL (ref 8.7–10.5)
CALCIUM SERPL-MCNC: 9.1 MG/DL (ref 8.7–10.5)
CHLORIDE SERPL-SCNC: 96 MMOL/L (ref 95–110)
CHLORIDE SERPL-SCNC: 97 MMOL/L (ref 95–110)
CHLORIDE SERPL-SCNC: 97 MMOL/L (ref 95–110)
CHLORIDE SERPL-SCNC: 98 MMOL/L (ref 95–110)
CHLORIDE SERPL-SCNC: 98 MMOL/L (ref 95–110)
CLARITY UR: CLEAR
CO2 SERPL-SCNC: 10 MMOL/L (ref 23–29)
CO2 SERPL-SCNC: 11 MMOL/L (ref 23–29)
CO2 SERPL-SCNC: 12 MMOL/L (ref 23–29)
CO2 SERPL-SCNC: 13 MMOL/L (ref 23–29)
CO2 SERPL-SCNC: 7 MMOL/L (ref 23–29)
COLOR UR: YELLOW
CREAT SERPL-MCNC: 0.7 MG/DL (ref 0.5–1.4)
CREAT SERPL-MCNC: 0.8 MG/DL (ref 0.5–1.4)
CREAT SERPL-MCNC: 0.9 MG/DL (ref 0.5–1.4)
DELSYS: ABNORMAL
DIFFERENTIAL METHOD: ABNORMAL
EOSINOPHIL # BLD AUTO: 0.2 K/UL (ref 0–0.5)
EOSINOPHIL NFR BLD: 1.7 % (ref 0–8)
ERYTHROCYTE [DISTWIDTH] IN BLOOD BY AUTOMATED COUNT: 12.6 % (ref 11.5–14.5)
EST. GFR  (NO RACE VARIABLE): >60 ML/MIN/1.73 M^2
ETHANOL SERPL-MCNC: <10 MG/DL
FIO2: 21 (ref 21–100)
GLUCOSE SERPL-MCNC: 269 MG/DL (ref 70–110)
GLUCOSE SERPL-MCNC: 280 MG/DL (ref 70–110)
GLUCOSE SERPL-MCNC: 286 MG/DL (ref 70–110)
GLUCOSE SERPL-MCNC: 289 MG/DL (ref 70–110)
GLUCOSE SERPL-MCNC: 342 MG/DL (ref 70–110)
GLUCOSE UR QL STRIP: ABNORMAL
HCO3 UR-SCNC: 21.8 MMOL/L
HCT VFR BLD AUTO: 40.6 % (ref 37–48.5)
HGB BLD-MCNC: 15.2 G/DL (ref 12–16)
HGB UR QL STRIP: ABNORMAL
IMM GRANULOCYTES # BLD AUTO: 0.05 K/UL (ref 0–0.04)
IMM GRANULOCYTES NFR BLD AUTO: 0.5 % (ref 0–0.5)
KETONES UR QL STRIP: NEGATIVE
LACTATE SERPL-SCNC: 3.3 MMOL/L (ref 0.5–2.2)
LEUKOCYTE ESTERASE UR QL STRIP: NEGATIVE
LYMPHOCYTES # BLD AUTO: 3.6 K/UL (ref 1–4.8)
LYMPHOCYTES NFR BLD: 36.7 % (ref 18–48)
MCH RBC QN AUTO: 32.3 PG (ref 27–31)
MCHC RBC AUTO-ENTMCNC: 37.4 G/DL (ref 32–36)
MCV RBC AUTO: 86 FL (ref 82–98)
MONOCYTES # BLD AUTO: 0.6 K/UL (ref 0.3–1)
MONOCYTES NFR BLD: 6.5 % (ref 4–15)
NEUTROPHILS # BLD AUTO: 5.3 K/UL (ref 1.8–7.7)
NEUTROPHILS NFR BLD: 54 % (ref 38–73)
NITRITE UR QL STRIP: NEGATIVE
NRBC BLD-RTO: 0 /100 WBC
PCO2 BLDA: 36 MMHG (ref 35–45)
PH SMN: 7.39 [PH] (ref 7.35–7.45)
PH UR STRIP: 6 [PH] (ref 5–8)
PHOSPHATE SERPL-MCNC: 1.9 MG/DL (ref 2.7–4.5)
PHOSPHATE SERPL-MCNC: 2.5 MG/DL (ref 2.7–4.5)
PHOSPHATE SERPL-MCNC: 2.6 MG/DL (ref 2.7–4.5)
PHOSPHATE SERPL-MCNC: 2.6 MG/DL (ref 2.7–4.5)
PHOSPHATE SERPL-MCNC: 2.9 MG/DL (ref 2.7–4.5)
PHOSPHATE SERPL-MCNC: 2.9 MG/DL (ref 2.7–4.5)
PLATELET # BLD AUTO: 207 K/UL (ref 150–450)
PMV BLD AUTO: 12 FL (ref 9.2–12.9)
PO2 BLDA: 77 MMHG (ref 75–100)
POC BE: -2.6 MMOL/L (ref -2–2)
POC COHB: ABNORMAL
POC METHB: ABNORMAL
POC O2HB ARTERIAL: ABNORMAL
POC SATURATED O2: ABNORMAL
POC TCO2: 22.9 MMOL/L
POC THB: ABNORMAL
POCT GLUCOSE: 216 MG/DL (ref 70–110)
POCT GLUCOSE: 239 MG/DL (ref 70–110)
POCT GLUCOSE: 244 MG/DL (ref 70–110)
POCT GLUCOSE: 244 MG/DL (ref 70–110)
POCT GLUCOSE: 273 MG/DL (ref 70–110)
POTASSIUM SERPL-SCNC: 3.6 MMOL/L (ref 3.5–5.1)
POTASSIUM SERPL-SCNC: 3.6 MMOL/L (ref 3.5–5.1)
POTASSIUM SERPL-SCNC: 3.7 MMOL/L (ref 3.5–5.1)
POTASSIUM SERPL-SCNC: 3.9 MMOL/L (ref 3.5–5.1)
POTASSIUM SERPL-SCNC: 4 MMOL/L (ref 3.5–5.1)
PROT UR QL STRIP: NEGATIVE
RBC # BLD AUTO: 4.71 M/UL (ref 4–5.4)
SALICYLATES SERPL-MCNC: <5 MG/DL (ref 15–30)
SITE: ABNORMAL
SODIUM SERPL-SCNC: 126 MMOL/L (ref 136–145)
SODIUM SERPL-SCNC: 127 MMOL/L (ref 136–145)
SODIUM SERPL-SCNC: 127 MMOL/L (ref 136–145)
SODIUM SERPL-SCNC: 128 MMOL/L (ref 136–145)
SODIUM SERPL-SCNC: 129 MMOL/L (ref 136–145)
SP GR UR STRIP: 1.02 (ref 1–1.03)
URN SPEC COLLECT METH UR: ABNORMAL
UROBILINOGEN UR STRIP-ACNC: NEGATIVE EU/DL
WBC # BLD AUTO: 9.73 K/UL (ref 3.9–12.7)

## 2023-08-30 PROCEDURE — 25000003 PHARM REV CODE 250: Performed by: FAMILY MEDICINE

## 2023-08-30 PROCEDURE — 99232 PR SUBSEQUENT HOSPITAL CARE,LEVL II: ICD-10-PCS | Mod: ,,, | Performed by: PHYSICIAN ASSISTANT

## 2023-08-30 PROCEDURE — 80307 DRUG TEST PRSMV CHEM ANLYZR: CPT | Performed by: PHYSICIAN ASSISTANT

## 2023-08-30 PROCEDURE — 83605 ASSAY OF LACTIC ACID: CPT | Performed by: PHYSICIAN ASSISTANT

## 2023-08-30 PROCEDURE — 80048 BASIC METABOLIC PNL TOTAL CA: CPT | Mod: 91 | Performed by: INTERNAL MEDICINE

## 2023-08-30 PROCEDURE — 25000003 PHARM REV CODE 250: Performed by: INTERNAL MEDICINE

## 2023-08-30 PROCEDURE — 85025 COMPLETE CBC W/AUTO DIFF WBC: CPT | Performed by: PHYSICIAN ASSISTANT

## 2023-08-30 PROCEDURE — 63600175 PHARM REV CODE 636 W HCPCS: Performed by: PHYSICIAN ASSISTANT

## 2023-08-30 PROCEDURE — 36415 COLL VENOUS BLD VENIPUNCTURE: CPT | Performed by: EMERGENCY MEDICINE

## 2023-08-30 PROCEDURE — 25000003 PHARM REV CODE 250: Performed by: PHYSICIAN ASSISTANT

## 2023-08-30 PROCEDURE — 82010 KETONE BODYS QUAN: CPT | Performed by: PHYSICIAN ASSISTANT

## 2023-08-30 PROCEDURE — 81003 URINALYSIS AUTO W/O SCOPE: CPT | Performed by: PHYSICIAN ASSISTANT

## 2023-08-30 PROCEDURE — 99900035 HC TECH TIME PER 15 MIN (STAT)

## 2023-08-30 PROCEDURE — 82077 ASSAY SPEC XCP UR&BREATH IA: CPT | Performed by: PHYSICIAN ASSISTANT

## 2023-08-30 PROCEDURE — 80179 DRUG ASSAY SALICYLATE: CPT | Performed by: PHYSICIAN ASSISTANT

## 2023-08-30 PROCEDURE — 36415 COLL VENOUS BLD VENIPUNCTURE: CPT | Performed by: PHYSICIAN ASSISTANT

## 2023-08-30 PROCEDURE — 80048 BASIC METABOLIC PNL TOTAL CA: CPT | Mod: 91 | Performed by: PHYSICIAN ASSISTANT

## 2023-08-30 PROCEDURE — 82077 ASSAY SPEC XCP UR&BREATH IA: CPT | Mod: 91 | Performed by: PHYSICIAN ASSISTANT

## 2023-08-30 PROCEDURE — 84100 ASSAY OF PHOSPHORUS: CPT | Mod: 91 | Performed by: EMERGENCY MEDICINE

## 2023-08-30 PROCEDURE — 63600175 PHARM REV CODE 636 W HCPCS: Performed by: INTERNAL MEDICINE

## 2023-08-30 PROCEDURE — 36600 WITHDRAWAL OF ARTERIAL BLOOD: CPT

## 2023-08-30 PROCEDURE — 82803 BLOOD GASES ANY COMBINATION: CPT | Performed by: PHYSICIAN ASSISTANT

## 2023-08-30 PROCEDURE — 99232 SBSQ HOSP IP/OBS MODERATE 35: CPT | Mod: ,,, | Performed by: PHYSICIAN ASSISTANT

## 2023-08-30 PROCEDURE — 11000001 HC ACUTE MED/SURG PRIVATE ROOM

## 2023-08-30 PROCEDURE — 94761 N-INVAS EAR/PLS OXIMETRY MLT: CPT

## 2023-08-30 RX ORDER — DEXTROSE MONOHYDRATE, SODIUM CHLORIDE, AND POTASSIUM CHLORIDE 50; 1.49; 4.5 G/1000ML; G/1000ML; G/1000ML
INJECTION, SOLUTION INTRAVENOUS CONTINUOUS
Status: DISCONTINUED | OUTPATIENT
Start: 2023-08-30 | End: 2023-08-30

## 2023-08-30 RX ORDER — ACETAMINOPHEN 500 MG
500 TABLET ORAL EVERY 6 HOURS PRN
Status: DISCONTINUED | OUTPATIENT
Start: 2023-08-30 | End: 2023-09-02

## 2023-08-30 RX ORDER — IBUPROFEN 200 MG
16 TABLET ORAL
Status: DISCONTINUED | OUTPATIENT
Start: 2023-08-30 | End: 2023-09-01

## 2023-08-30 RX ORDER — GLUCAGON 1 MG
1 KIT INJECTION
Status: DISCONTINUED | OUTPATIENT
Start: 2023-08-30 | End: 2023-09-01

## 2023-08-30 RX ORDER — SODIUM CHLORIDE 9 MG/ML
INJECTION, SOLUTION INTRAVENOUS CONTINUOUS
Status: DISCONTINUED | OUTPATIENT
Start: 2023-08-30 | End: 2023-09-01

## 2023-08-30 RX ORDER — INSULIN ASPART 100 [IU]/ML
0-10 INJECTION, SOLUTION INTRAVENOUS; SUBCUTANEOUS
Status: DISCONTINUED | OUTPATIENT
Start: 2023-08-30 | End: 2023-09-01

## 2023-08-30 RX ORDER — SODIUM CHLORIDE 9 MG/ML
INJECTION, SOLUTION INTRAVENOUS CONTINUOUS
Status: DISCONTINUED | OUTPATIENT
Start: 2023-08-30 | End: 2023-08-30

## 2023-08-30 RX ORDER — ASPIRIN 81 MG/1
81 TABLET ORAL DAILY
Status: DISCONTINUED | OUTPATIENT
Start: 2023-08-30 | End: 2023-08-30

## 2023-08-30 RX ORDER — IBUPROFEN 200 MG
24 TABLET ORAL
Status: DISCONTINUED | OUTPATIENT
Start: 2023-08-30 | End: 2023-09-01

## 2023-08-30 RX ADMIN — DEXTROSE, SODIUM CHLORIDE, AND POTASSIUM CHLORIDE: 5; .45; .15 INJECTION INTRAVENOUS at 07:08

## 2023-08-30 RX ADMIN — SODIUM CHLORIDE: 9 INJECTION, SOLUTION INTRAVENOUS at 05:08

## 2023-08-30 RX ADMIN — INSULIN ASPART 4 UNITS: 100 INJECTION, SOLUTION INTRAVENOUS; SUBCUTANEOUS at 08:08

## 2023-08-30 RX ADMIN — ACETAMINOPHEN 500 MG: 500 TABLET ORAL at 10:08

## 2023-08-30 RX ADMIN — INSULIN ASPART 2 UNITS: 100 INJECTION, SOLUTION INTRAVENOUS; SUBCUTANEOUS at 05:08

## 2023-08-30 RX ADMIN — ASPIRIN 81 MG: 81 TABLET, COATED ORAL at 12:08

## 2023-08-30 RX ADMIN — INSULIN ASPART 6 UNITS: 100 INJECTION, SOLUTION INTRAVENOUS; SUBCUTANEOUS at 12:08

## 2023-08-30 RX ADMIN — MUPIROCIN: 20 OINTMENT TOPICAL at 08:08

## 2023-08-30 RX ADMIN — ACETAMINOPHEN 500 MG: 500 TABLET ORAL at 11:08

## 2023-08-30 RX ADMIN — ENOXAPARIN SODIUM 40 MG: 40 INJECTION SUBCUTANEOUS at 05:08

## 2023-08-30 RX ADMIN — CYCLOBENZAPRINE HYDROCHLORIDE 5 MG: 5 TABLET, FILM COATED ORAL at 04:08

## 2023-08-30 RX ADMIN — INSULIN ASPART 6 UNITS: 100 INJECTION, SOLUTION INTRAVENOUS; SUBCUTANEOUS at 05:08

## 2023-08-30 RX ADMIN — INSULIN ASPART 2 UNITS: 100 INJECTION, SOLUTION INTRAVENOUS; SUBCUTANEOUS at 08:08

## 2023-08-30 RX ADMIN — SODIUM CHLORIDE: 9 INJECTION, SOLUTION INTRAVENOUS at 11:08

## 2023-08-30 RX ADMIN — ACETAMINOPHEN 500 MG: 500 TABLET ORAL at 04:08

## 2023-08-30 RX ADMIN — CYCLOBENZAPRINE HYDROCHLORIDE 5 MG: 5 TABLET, FILM COATED ORAL at 09:08

## 2023-08-30 NOTE — ASSESSMENT & PLAN NOTE
Patient with acute kidney injury/acute renal failure likely due to pre-renal azotemia due to dehydration POLY is currently improving. Baseline creatinine 0.8 - Labs reviewed- Renal function/electrolytes with Estimated Creatinine Clearance: 104.1 mL/min (based on SCr of 0.7 mg/dL). according to latest data. Monitor urine output and serial BMP and adjust therapy as needed. Avoid nephrotoxins and renally dose meds for GFR listed above.    Resolved

## 2023-08-30 NOTE — HOSPITAL COURSE
Patient taken off insulin gtt yesterday once anion gap closed but continues to remain elevated.  Glucose in better range.  Started on dextrose 1/2 normal saline with s/s insulin but expected patient to be able to go back home on metformin since A1C in the mid 8's.  Starting metabolic acidosis work up.      PT Hd stable on room air. Anion gap fluctuating.  Will continue IV fluids.  Continue sliding scale insulin.  Will continue to monitor.  Metabolic acidosis work up unremarkable so far.  Will trial long acting insulin.  Likely will need insulin at discharge and referral to endocrinology.      PT HD stable on room air.  She is having excruciating neck pain.  Attempting to get MRI since pain is worse.  Xray of right foot first due to h/o nail in foot.  Gabapentin started for pain.  Started on fenofibrate, omega 3 and dietician consulted for hypertriglyceridemia.  Likely etiology of abnormal bicarbs.  Lipase trending down.      9/2  Moved to ICU yesterday afternoon to start on insulin drip to treat hypertriglyeridemia. Improved trends in all labs including triglycerides. MRI yesterday revealed severe cervical changes which explain patient's significant symptomatology. Clinically she is stable and labs are trending in right direction. No new or acute changes today.    9/3  Marked improvement in triglycerides - Co2 and lab derrangements are following. Neck pain is much improved. Anticipate 24 more hours in icu for insulin drip and hopefully home tomorrow. Pain is well controlled.    9/4  TRIGS improved. Gap closed and stable. Will plan for discharge this afternoon. She will be discharged on metformin and lantus or levemir 15 units qhs. Has f/u with PCP this week. Will refer to endocrinology as well.

## 2023-08-30 NOTE — PROGRESS NOTES
[facility] ([address] [place] [address] Medicine  Progress Note    Patient Name: Kiana Hardy  MRN: [id number]  Patient Class: IP- Inpatient   Admission Date: 2023  Length of Stay: 2 days  Attending Physician: Clyde Jenkins MD  Primary Care Provider: Grady Billings MD        Subjective:     Principal Problem:Type 2 diabetes mellitus with hyperosmolar hyperglycemic state (HHS)        HPI:  Patient is a 38 year old female with medical history of DM type 2 on metformin and former tobacco use disorder who presented to the ED with polyuria.  Patient states on Thursday she noticed xerostomia.  Then on Saturday she went to urgent care for neck and should pain and was started on po steroids. Yesterday she felt fatigued, noticed polyuria and hyperglycemia.  She says she has not been follow up with her PCP or taking good care of herself.  She only sees a chiropractor for the past 5 months for neck and shoulder pain.  She denies fever, chills, CP, SOB, but had nausea and vomiting that is now resolved.        Admitted for HHS.               Overview/Hospital Course:  Patient taken off insulin gtt yesterday once anion gap closed but continues to remain elevated.  Glucose in better range.  Started on dextrose 1/2 normal saline with s/s insulin but expected patient to be able to go back home on metformin since A1C in the mid 8's.  Starting metabolic acidosis work up.        Past Medical History:   Diagnosis Date    Diabetes mellitus     Thyroid disease     hypothyroidism    Thyroid disease during pregnancy, second trimester 10/18/2019       Past Surgical History:   Procedure Laterality Date     SECTION N/A 2020    Procedure: REPEAT  SECTION;  Surgeon: Onelia Crespo MD;  Location: [facility];  Service: OB/GYN;  Laterality: N/A;    CHOLECYSTECTOMY      DILATION AND CURETTAGE OF UTERUS      FOOT SURGERY Right     has a nail in the foot    SALPINGECTOMY Left 2020     "Procedure: SALPINGECTOMY;  Surgeon: Onelia Crespo MD;  Location: [facility] OR;  Service: OB/GYN;  Laterality: Left;    SALPINGOOPHORECTOMY Right 9/24/2019    Procedure: RIGHT SALPINGO-OOPHORECTOMY USO;  Surgeon: Mary Iglesias MD;  Location: [facility] OR;  Service: OB/GYN;  Laterality: Right;    TONSILLECTOMY  1995    TUBAL LIGATION         Review of patient's allergies indicates:  No Known Allergies    No current facility-administered medications on file prior to encounter.     Current Outpatient Medications on File Prior to Encounter   Medication Sig    cyclobenzaprine (FLEXERIL) 10 MG tablet Take 10 mg by mouth every 8 (eight) hours.    diclofenac (CATAFLAM) 50 MG tablet Take 50 mg by mouth every 8 (eight) hours as needed (pain). x7 days    predniSONE (DELTASONE) 50 MG Tab Take 50 mg by mouth once daily.    aspirin (ECOTRIN) 81 MG EC tablet Take 81 mg by mouth once daily.    BD INSULIN SYRINGE ULTRA-FINE 0.5 mL 31 gauge x 5/16" Syrg Inject 1 Syringe into the skin once daily.    blood sugar diagnostic (RELION PRIME TEST STRIPS) Strp 1 strip by Misc.(Non-Drug; Combo Route) route 4 (four) times daily.    insulin syringe-needle U-100 (BD INSULIN SYRINGE ULTRA-FINE) 0.3 mL 31 gauge x 5/16" Syrg Use in the evening as directed.    metFORMIN (GLUCOPHAGE) 1000 MG tablet Take 1 tablet (1,000 mg total) by mouth 2 (two) times daily with meals. (Patient not taking: Reported on 8/28/2023)    pen needle, diabetic 29 gauge x 1/2" Ndle Use four times daily with insulin pens    TRUE METRIX GLUCOSE TEST STRIP Strp     TRUEPLUS LANCETS 28 gauge Misc     [DISCONTINUED] HUMALOG U-100 INSULIN 100 unit/mL injection Inject 5 Units into the skin 2 (two) times daily.      Family History       Problem Relation (Age of Onset)    Breast cancer Maternal Grandmother    COPD Mother    Diabetes Father          Tobacco Use    Smoking status: Former     Current packs/day: 0.50     Average packs/day: 0.5 packs/day for 23.0 years (11.5 " ttl pk-yrs)     Types: Cigarettes    Smokeless tobacco: Never   Substance and Sexual Activity    Alcohol use: Not Currently    Drug use: Never     Types: Methamphetamines, Marijuana     Comment: last use mauijuana & meth 2019    Sexual activity: Yes     Partners: Male     Birth control/protection: See Surgical Hx     Comment:      Review of Systems   Constitutional:  Positive for fatigue (improving). Negative for chills and fever.   HENT:  Negative for ear discharge and ear pain.    Eyes:  Negative for pain and discharge.   Respiratory:  Negative for cough and shortness of breath.    Cardiovascular:  Negative for chest pain and leg swelling.   Gastrointestinal:  Positive for nausea (resolved) and vomiting (resolved).   Endocrine: Positive for polydipsia and polyuria. Negative for cold intolerance and heat intolerance.   Genitourinary:  Negative for difficulty urinating and dysuria.   Musculoskeletal:  Negative for joint swelling and myalgias.   Skin:  Negative for rash and wound.   Neurological:  Negative for dizziness and headaches.   Psychiatric/Behavioral:  Negative for agitation and confusion.      Objective:     Vital Signs (Most Recent):  Temp: 97.9 °F (36.6 °C) (08/30/23 1126)  Pulse: 94 (08/30/23 1200)  Resp: 16 (08/30/23 1126)  BP: 120/66 (08/30/23 1126)  SpO2: 97 % (08/30/23 1126) Vital Signs (24h Range):  Temp:  [97.1 °F (36.2 °C)-98.4 °F (36.9 °C)] 97.9 °F (36.6 °C)  Pulse:  [] 94  Resp:  [12-22] 16  SpO2:  [95 %-97 %] 97 %  BP: (118-149)/(66-97) 120/66     Weight: 72.3 kg (159 lb 6.3 oz)  Body mass index is 27.36 kg/m².     Physical Exam  Constitutional:       General: She is not in acute distress.  HENT:      Head: Normocephalic and atraumatic.   Eyes:      General:         Right eye: No discharge.         Left eye: No discharge.   Cardiovascular:      Rate and Rhythm: Normal rate and regular rhythm.   Pulmonary:      Effort: Pulmonary effort is normal.      Breath sounds: Normal  "breath sounds.   Abdominal:      General: There is no distension.      Tenderness: There is no abdominal tenderness.   Musculoskeletal:         General: No swelling or tenderness.      Cervical back: Neck supple. No tenderness.   Skin:     General: Skin is warm and dry.   Neurological:      General: No focal deficit present.      Mental Status: She is alert and oriented to person, place, and time.   Psychiatric:         Mood and Affect: Mood normal.         Behavior: Behavior normal.                Significant Labs: A1C:   Recent Labs   Lab 08/28/23  1732   HGBA1C 8.5*       ABGs: No results for input(s): "PH", "PCO2", "HCO3", "POCSATURATED", "BE", "TOTALHB", "COHB", "METHB", "O2HB", "POCFIO2", "PO2" in the last 48 hours.  Bilirubin:   Recent Labs   Lab 08/28/23  1546 08/28/23 1922 08/29/23  1258   BILITOT 0.3 0.2 0.4       Blood Culture: No results for input(s): "LABBLOO" in the last 48 hours.  BMP:   Recent Labs   Lab 08/28/23  1732 08/28/23  1922 08/30/23  0805   GLU  --    < > 286*   NA  --    < > 127*   K  --    < > 3.7   CL  --    < > 97   CO2  --    < > 12*   BUN  --    < > 8   CREATININE  --    < > 0.7   CALCIUM  --    < > 8.6*   MG 2.0  --   --     < > = values in this interval not displayed.       CBC:   Recent Labs   Lab 08/28/23  1546   WBC 10.41   HGB 15.4   HCT 42.7          CMP:   Recent Labs   Lab 08/28/23  1546 08/28/23  1922 08/28/23  2356 08/29/23  1258 08/29/23  1604 08/29/23  2350 08/30/23  0353 08/30/23  0805   * 124*   < > 130*   < > 129* 128* 127*   K 5.0 4.2   < > 3.5   < > 3.6 3.6 3.7   CL 87* 93*   < > 101   < > 98 98 97   CO2 7* 12*   < > 11*   < > 13* 7* 12*   * 351*   < > 245*   < > 289* 280* 286*   BUN 20 16   < > 9   < > 11 10 8   CREATININE 1.3 0.8   < > 0.7   < > 0.7 0.7 0.7   CALCIUM 10.2 10.0   < > 8.1*   < > 8.6* 8.9 8.6*   PROT 8.8* 8.8*  --  6.7  --   --   --   --    ALBUMIN 4.0 3.8  --  3.1*  --   --   --   --    BILITOT 0.3 0.2  --  0.4  --   --   --   -- " "   ALKPHOS 170* 142*  --  87  --   --   --   --    AST 46* 35  --  30  --   --   --   --    * 97*  --  71*  --   --   --   --    ANIONGAP 26* 19*   < > 18*   < > 18* 23* 18*    < > = values in this interval not displayed.       Cardiac Markers: No results for input(s): "CKMB", "MYOGLOBIN", "BNP", "TROPISTAT" in the last 48 hours.  Coagulation: No results for input(s): "PT", "INR", "APTT" in the last 48 hours.  Lactic Acid: No results for input(s): "LACTATE" in the last 48 hours.  Lipase: No results for input(s): "LIPASE" in the last 48 hours.  Lipid Panel: No results for input(s): "CHOL", "HDL", "LDLCALC", "TRIG", "CHOLHDL" in the last 48 hours.  Magnesium:   Recent Labs   Lab 08/28/23  1732   MG 2.0       POCT Glucose:   Recent Labs   Lab 08/30/23  0501 08/30/23  0713 08/30/23  1128   POCTGLUCOSE 244* 216* 273*       Prealbumin: No results for input(s): "PREALBUMIN" in the last 48 hours.  Respiratory Culture: No results for input(s): "GSRESP", "RESPIRATORYC" in the last 48 hours.  Troponin: No results for input(s): "TROPONINI", "TROPONINIHS" in the last 48 hours.  TSH: No results for input(s): "TSH" in the last 4320 hours.  Urine Culture: No results for input(s): "LABURIN" in the last 48 hours.  Urine Studies:   Recent Labs   Lab 08/28/23  1555   COLORU Yellow   APPEARANCEUA Clear   PHUR 6.0   SPECGRAV 1.010   PROTEINUA Negative   GLUCUA 3+*   KETONESU Trace*   BILIRUBINUA Negative   OCCULTUA 3+*   NITRITE Negative   UROBILINOGEN Negative   LEUKOCYTESUR Negative   RBCUA 5*   BACTERIA Few*         Significant Imaging: I have reviewed all pertinent imaging results/findings within the past 24 hours.      Assessment/Plan:      * Type 2 diabetes mellitus with hyperosmolar hyperglycemic state (HHS)  Patient's FSGs are controlled on current medication regimen.  Last A1c reviewed-   Lab Results   Component Value Date    HGBA1C 8.5 (H) 08/28/2023     Most recent fingerstick glucose reviewed-   Recent Labs   Lab " 08/29/23  1959 08/30/23  0501 08/30/23  0713 08/30/23  1128   POCTGLUCOSE 259* 244* 216* 273*     Current correctional scale on insulin gtt   Maintain anti-hyperglycemic dose as follows-   Antihyperglycemics (From admission, onward)    Start     Stop Route Frequency Ordered    08/30/23 0922  insulin aspart U-100 pen 0-10 Units         -- SubQ Before meals & nightly PRN 08/30/23 0822        Hold Oral hypoglycemics while patient is in the hospital.    Beta hydroxybutyrate negative, trace ketones  Waiting for closure of anion gap and will start SC insulin and diet.  Likely triggered by steroid use.       8/30 Glucose levels improving.  S/s insulin in place.  Will decrease dextrose 75cc hour and d/c this afternoon.      Metabolic acidosis due to diabetes mellitus  Anion gap elevated at admission and slowly improving   Will monitor and once closed will transition from insulin gtt to sc insulin.      Suspected once anion gap close to continue to improve.  However anion gap increased.  Metabolic acidosis work up in place.  UDS, U/A repeat, beta hydroxybutryate, lactic acid, ethanol, methanol, salicylate level     POLY (acute kidney injury)  Patient with acute kidney injury/acute renal failure likely due to pre-renal azotemia due to dehydration POLY is currently improving. Baseline creatinine 0.8 - Labs reviewed- Renal function/electrolytes with Estimated Creatinine Clearance: 104.1 mL/min (based on SCr of 0.7 mg/dL). according to latest data. Monitor urine output and serial BMP and adjust therapy as needed. Avoid nephrotoxins and renally dose meds for GFR listed above.    Resolved     Former smoker  In remission         VTE Risk Mitigation (From admission, onward)         Ordered     enoxaparin injection 40 mg  Every 24 hours         08/28/23 2040     IP VTE HIGH RISK PATIENT  Once         08/28/23 2105     Place sequential compression device  Until discontinued         08/28/23 2105     Place sequential compression device   Until discontinued         08/28/23 1902     Place sequential compression device  Until discontinued         08/28/23 1853     Place sequential compression device  Until discontinued         08/28/23 1853                Discharge Planning   DORON:      Code Status: Full Code   Is the patient medically ready for discharge?:     Reason for patient still in hospital (select all that apply): Patient new problem and Patient trending condition  Discharge Plan A: Home with family, Home                  Misty Pack PA-C  Department of Hospital Medicine   [place] [facility] (3rd Fl)

## 2023-08-30 NOTE — SUBJECTIVE & OBJECTIVE
"Past Medical History:   Diagnosis Date    Diabetes mellitus     Thyroid disease     hypothyroidism    Thyroid disease during pregnancy, second trimester 10/18/2019       Past Surgical History:   Procedure Laterality Date     SECTION N/A 2020    Procedure: REPEAT  SECTION;  Surgeon: Onelia Crespo MD;  Location: [facility];  Service: OB/GYN;  Laterality: N/A;    CHOLECYSTECTOMY      DILATION AND CURETTAGE OF UTERUS      FOOT SURGERY Right     has a nail in the foot    SALPINGECTOMY Left 2020    Procedure: SALPINGECTOMY;  Surgeon: Onelia Crespo MD;  Location: [facility];  Service: OB/GYN;  Laterality: Left;    SALPINGOOPHORECTOMY Right 2019    Procedure: RIGHT SALPINGO-OOPHORECTOMY USO;  Surgeon: Mary Iglesias MD;  Location: [facility];  Service: OB/GYN;  Laterality: Right;    TONSILLECTOMY      TUBAL LIGATION         Review of patient's allergies indicates:  No Known Allergies    No current facility-administered medications on file prior to encounter.     Current Outpatient Medications on File Prior to Encounter   Medication Sig    cyclobenzaprine (FLEXERIL) 10 MG tablet Take 10 mg by mouth every 8 (eight) hours.    diclofenac (CATAFLAM) 50 MG tablet Take 50 mg by mouth every 8 (eight) hours as needed (pain). x7 days    predniSONE (DELTASONE) 50 MG Tab Take 50 mg by mouth once daily.    aspirin (ECOTRIN) 81 MG EC tablet Take 81 mg by mouth once daily.    BD INSULIN SYRINGE ULTRA-FINE 0.5 mL 31 gauge x 5/16" Syrg Inject 1 Syringe into the skin once daily.    blood sugar diagnostic (RELION PRIME TEST STRIPS) Strp 1 strip by Misc.(Non-Drug; Combo Route) route 4 (four) times daily.    insulin syringe-needle U-100 (BD INSULIN SYRINGE ULTRA-FINE) 0.3 mL 31 gauge x 5/16" Syrg Use in the evening as directed.    metFORMIN (GLUCOPHAGE) 1000 MG tablet Take 1 tablet (1,000 mg total) by mouth 2 (two) times daily with meals. (Patient not taking: Reported on 2023)    pen needle, " ----- Message from Kathryn Bernstein MD sent at 5/9/2023  6:45 PM CDT -----  Regarding: FW: Notification of Unviewed Test Results  Pt did not see this on the portal.  Please print and mail to her by US mail.   Thanks  dp  ----- Message -----  From: Patient Portal,   Sent: 5/9/2023   6:32 AM CDT  To: Kathryn Bernstein MD  Subject: Notification of Unviewed Test Results            CHUY YADAV has not viewed the following results:  - COMPREHENSIVE METABOLIC PANEL  - LIPID PANEL WITH REFLEX  - THYROID STIMULATING HORMONE REFLEX  - VITAMIN D -25 HYDROXY  - CBC WITH AUTOMATED DIFFERENTIAL (PERFORMABLE ONLY)     "diabetic 29 gauge x 1/2" Ndle Use four times daily with insulin pens    TRUE METRIX GLUCOSE TEST STRIP Strp     TRUEPLUS LANCETS 28 gauge Misc     [DISCONTINUED] HUMALOG U-100 INSULIN 100 unit/mL injection Inject 5 Units into the skin 2 (two) times daily.      Family History       Problem Relation (Age of Onset)    Breast cancer Maternal Grandmother    COPD Mother    Diabetes Father          Tobacco Use    Smoking status: Former     Current packs/day: 0.50     Average packs/day: 0.5 packs/day for 23.0 years (11.5 ttl pk-yrs)     Types: Cigarettes    Smokeless tobacco: Never   Substance and Sexual Activity    Alcohol use: Not Currently    Drug use: Never     Types: Methamphetamines, Marijuana     Comment: last use mauijuana & meth 2019    Sexual activity: Yes     Partners: Male     Birth control/protection: See Surgical Hx     Comment:      Review of Systems   Constitutional:  Positive for fatigue (improving). Negative for chills and fever.   HENT:  Negative for ear discharge and ear pain.    Eyes:  Negative for pain and discharge.   Respiratory:  Negative for cough and shortness of breath.    Cardiovascular:  Negative for chest pain and leg swelling.   Gastrointestinal:  Positive for nausea (resolved) and vomiting (resolved).   Endocrine: Positive for polydipsia and polyuria. Negative for cold intolerance and heat intolerance.   Genitourinary:  Negative for difficulty urinating and dysuria.   Musculoskeletal:  Negative for joint swelling and myalgias.   Skin:  Negative for rash and wound.   Neurological:  Negative for dizziness and headaches.   Psychiatric/Behavioral:  Negative for agitation and confusion.      Objective:     Vital Signs (Most Recent):  Temp: 97.9 °F (36.6 °C) (08/30/23 1126)  Pulse: 94 (08/30/23 1200)  Resp: 16 (08/30/23 1126)  BP: 120/66 (08/30/23 1126)  SpO2: 97 % (08/30/23 1126) Vital Signs (24h Range):  Temp:  [97.1 °F (36.2 °C)-98.4 °F (36.9 °C)] 97.9 °F (36.6 °C)  Pulse:  [] " "94  Resp:  [12-22] 16  SpO2:  [95 %-97 %] 97 %  BP: (118-149)/(66-97) 120/66     Weight: 72.3 kg (159 lb 6.3 oz)  Body mass index is 27.36 kg/m².     Physical Exam  Constitutional:       General: She is not in acute distress.  HENT:      Head: Normocephalic and atraumatic.   Eyes:      General:         Right eye: No discharge.         Left eye: No discharge.   Cardiovascular:      Rate and Rhythm: Normal rate and regular rhythm.   Pulmonary:      Effort: Pulmonary effort is normal.      Breath sounds: Normal breath sounds.   Abdominal:      General: There is no distension.      Tenderness: There is no abdominal tenderness.   Musculoskeletal:         General: No swelling or tenderness.      Cervical back: Neck supple. No tenderness.   Skin:     General: Skin is warm and dry.   Neurological:      General: No focal deficit present.      Mental Status: She is alert and oriented to person, place, and time.   Psychiatric:         Mood and Affect: Mood normal.         Behavior: Behavior normal.                Significant Labs: A1C:   Recent Labs   Lab 08/28/23 1732   HGBA1C 8.5*       ABGs: No results for input(s): "PH", "PCO2", "HCO3", "POCSATURATED", "BE", "TOTALHB", "COHB", "METHB", "O2HB", "POCFIO2", "PO2" in the last 48 hours.  Bilirubin:   Recent Labs   Lab 08/28/23  1546 08/28/23 1922 08/29/23  1258   BILITOT 0.3 0.2 0.4       Blood Culture: No results for input(s): "LABBLOO" in the last 48 hours.  BMP:   Recent Labs   Lab 08/28/23  1732 08/28/23 1922 08/30/23  0805   GLU  --    < > 286*   NA  --    < > 127*   K  --    < > 3.7   CL  --    < > 97   CO2  --    < > 12*   BUN  --    < > 8   CREATININE  --    < > 0.7   CALCIUM  --    < > 8.6*   MG 2.0  --   --     < > = values in this interval not displayed.       CBC:   Recent Labs   Lab 08/28/23  1546   WBC 10.41   HGB 15.4   HCT 42.7          CMP:   Recent Labs   Lab 08/28/23  1546 08/28/23  1922 08/28/23  2356 08/29/23  1258 08/29/23  1604 08/29/23  2350 " "08/30/23  0353 08/30/23  0805   * 124*   < > 130*   < > 129* 128* 127*   K 5.0 4.2   < > 3.5   < > 3.6 3.6 3.7   CL 87* 93*   < > 101   < > 98 98 97   CO2 7* 12*   < > 11*   < > 13* 7* 12*   * 351*   < > 245*   < > 289* 280* 286*   BUN 20 16   < > 9   < > 11 10 8   CREATININE 1.3 0.8   < > 0.7   < > 0.7 0.7 0.7   CALCIUM 10.2 10.0   < > 8.1*   < > 8.6* 8.9 8.6*   PROT 8.8* 8.8*  --  6.7  --   --   --   --    ALBUMIN 4.0 3.8  --  3.1*  --   --   --   --    BILITOT 0.3 0.2  --  0.4  --   --   --   --    ALKPHOS 170* 142*  --  87  --   --   --   --    AST 46* 35  --  30  --   --   --   --    * 97*  --  71*  --   --   --   --    ANIONGAP 26* 19*   < > 18*   < > 18* 23* 18*    < > = values in this interval not displayed.       Cardiac Markers: No results for input(s): "CKMB", "MYOGLOBIN", "BNP", "TROPISTAT" in the last 48 hours.  Coagulation: No results for input(s): "PT", "INR", "APTT" in the last 48 hours.  Lactic Acid: No results for input(s): "LACTATE" in the last 48 hours.  Lipase: No results for input(s): "LIPASE" in the last 48 hours.  Lipid Panel: No results for input(s): "CHOL", "HDL", "LDLCALC", "TRIG", "CHOLHDL" in the last 48 hours.  Magnesium:   Recent Labs   Lab 08/28/23  1732   MG 2.0       POCT Glucose:   Recent Labs   Lab 08/30/23  0501 08/30/23  0713 08/30/23  1128   POCTGLUCOSE 244* 216* 273*       Prealbumin: No results for input(s): "PREALBUMIN" in the last 48 hours.  Respiratory Culture: No results for input(s): "GSRESP", "RESPIRATORYC" in the last 48 hours.  Troponin: No results for input(s): "TROPONINI", "TROPONINIHS" in the last 48 hours.  TSH: No results for input(s): "TSH" in the last 4320 hours.  Urine Culture: No results for input(s): "LABURIN" in the last 48 hours.  Urine Studies:   Recent Labs   Lab 08/28/23  1555   COLORU Yellow   APPEARANCEUA Clear   PHUR 6.0   SPECGRAV 1.010   PROTEINUA Negative   GLUCUA 3+*   KETONESU Trace*   BILIRUBINUA Negative   OCCULTUA 3+* "   NITRITE Negative   UROBILINOGEN Negative   LEUKOCYTESUR Negative   RBCUA 5*   BACTERIA Few*         Significant Imaging: I have reviewed all pertinent imaging results/findings within the past 24 hours.

## 2023-08-30 NOTE — PLAN OF CARE
Problem: Diabetes Comorbidity  Goal: Blood Glucose Level Within Targeted Range  Outcome: Ongoing, Progressing     Problem: Fluid and Electrolyte Imbalance (Acute Kidney Injury/Impairment)  Goal: Fluid and Electrolyte Balance  Outcome: Ongoing, Progressing     Problem: Oral Intake Inadequate (Acute Kidney Injury/Impairment)  Goal: Optimal Nutrition Intake  Outcome: Ongoing, Progressing     Problem: Hyperosmolar Hyperglycemic State  Goal: Serum Glucose and Electrolytes Within Desired Range  Outcome: Ongoing, Progressing

## 2023-08-30 NOTE — ASSESSMENT & PLAN NOTE
Patient's FSGs are controlled on current medication regimen.  Last A1c reviewed-   Lab Results   Component Value Date    HGBA1C 8.5 (H) 08/28/2023     Most recent fingerstick glucose reviewed-   Recent Labs   Lab 08/29/23  1959 08/30/23  0501 08/30/23  0713 08/30/23  1128   POCTGLUCOSE 259* 244* 216* 273*     Current correctional scale on insulin gtt   Maintain anti-hyperglycemic dose as follows-   Antihyperglycemics (From admission, onward)    Start     Stop Route Frequency Ordered    08/30/23 0922  insulin aspart U-100 pen 0-10 Units         -- SubQ Before meals & nightly PRN 08/30/23 0822        Hold Oral hypoglycemics while patient is in the hospital.    Beta hydroxybutyrate negative, trace ketones  Waiting for closure of anion gap and will start SC insulin and diet.  Likely triggered by steroid use.       8/30 Glucose levels improving.  S/s insulin in place.  Will decrease dextrose 75cc hour and d/c this afternoon.

## 2023-08-30 NOTE — ASSESSMENT & PLAN NOTE
Anion gap elevated at admission and slowly improving   Will monitor and once closed will transition from insulin gtt to sc insulin.      Suspected once anion gap close to continue to improve.  However anion gap increased.  Metabolic acidosis work up in place.  UDS, U/A repeat, beta hydroxybutryate, lactic acid, ethanol, methanol, salicylate level

## 2023-08-31 PROBLEM — E87.8 ELECTROLYTE ABNORMALITY: Status: ACTIVE | Noted: 2023-08-31

## 2023-08-31 LAB
AMPHET+METHAMPHET UR QL: NEGATIVE
ANION GAP SERPL CALC-SCNC: 14 MMOL/L (ref 8–16)
ANION GAP SERPL CALC-SCNC: 19 MMOL/L (ref 8–16)
ANION GAP SERPL CALC-SCNC: 19 MMOL/L (ref 8–16)
ANION GAP SERPL CALC-SCNC: 22 MMOL/L (ref 8–16)
BARBITURATES UR QL SCN>200 NG/ML: NEGATIVE
BENZODIAZ UR QL SCN>200 NG/ML: NEGATIVE
BUN SERPL-MCNC: 11 MG/DL (ref 6–20)
BUN SERPL-MCNC: 11 MG/DL (ref 6–20)
BUN SERPL-MCNC: 12 MG/DL (ref 6–20)
BUN SERPL-MCNC: 9 MG/DL (ref 6–20)
BZE UR QL SCN: NEGATIVE
CALCIUM SERPL-MCNC: 8.5 MG/DL (ref 8.7–10.5)
CALCIUM SERPL-MCNC: 8.7 MG/DL (ref 8.7–10.5)
CALCIUM SERPL-MCNC: 8.9 MG/DL (ref 8.7–10.5)
CALCIUM SERPL-MCNC: 9 MG/DL (ref 8.7–10.5)
CANNABINOIDS UR QL SCN: NEGATIVE
CHLORIDE SERPL-SCNC: 100 MMOL/L (ref 95–110)
CHLORIDE SERPL-SCNC: 95 MMOL/L (ref 95–110)
CHLORIDE SERPL-SCNC: 96 MMOL/L (ref 95–110)
CHLORIDE SERPL-SCNC: 97 MMOL/L (ref 95–110)
CHOLEST SERPL-MCNC: 596 MG/DL (ref 120–199)
CHOLEST/HDLC SERPL: 25.9 {RATIO} (ref 2–5)
CO2 SERPL-SCNC: 10 MMOL/L (ref 23–29)
CO2 SERPL-SCNC: 17 MMOL/L (ref 23–29)
CO2 SERPL-SCNC: 6 MMOL/L (ref 23–29)
CO2 SERPL-SCNC: 8 MMOL/L (ref 23–29)
CREAT SERPL-MCNC: 0.7 MG/DL (ref 0.5–1.4)
CREAT SERPL-MCNC: 0.8 MG/DL (ref 0.5–1.4)
CREAT UR-MCNC: 39 MG/DL (ref 15–325)
EST. GFR  (NO RACE VARIABLE): >60 ML/MIN/1.73 M^2
ETHANOL UR-MCNC: <10 MG/DL
GLUCOSE SERPL-MCNC: 229 MG/DL (ref 70–110)
GLUCOSE SERPL-MCNC: 235 MG/DL (ref 70–110)
GLUCOSE SERPL-MCNC: 240 MG/DL (ref 70–110)
GLUCOSE SERPL-MCNC: 286 MG/DL (ref 70–110)
HDLC SERPL-MCNC: 23 MG/DL (ref 40–75)
HDLC SERPL: 3.9 % (ref 20–50)
LACTATE SERPL-SCNC: 1.6 MMOL/L (ref 0.5–2.2)
LDLC SERPL CALC-MCNC: ABNORMAL MG/DL (ref 63–159)
MAGNESIUM SERPL-MCNC: 1.8 MG/DL (ref 1.6–2.6)
METHADONE UR QL SCN>300 NG/ML: NEGATIVE
NONHDLC SERPL-MCNC: 573 MG/DL
OPIATES UR QL SCN: NEGATIVE
PCP UR QL SCN>25 NG/ML: NEGATIVE
PHOSPHATE SERPL-MCNC: 1.4 MG/DL (ref 2.7–4.5)
PHOSPHATE SERPL-MCNC: 1.8 MG/DL (ref 2.7–4.5)
PHOSPHATE SERPL-MCNC: 2 MG/DL (ref 2.7–4.5)
PHOSPHATE SERPL-MCNC: 2 MG/DL (ref 2.7–4.5)
PHOSPHATE SERPL-MCNC: 2.6 MG/DL (ref 2.7–4.5)
POCT GLUCOSE: 210 MG/DL (ref 70–110)
POCT GLUCOSE: 224 MG/DL (ref 70–110)
POCT GLUCOSE: 226 MG/DL (ref 70–110)
POCT GLUCOSE: 310 MG/DL (ref 70–110)
POTASSIUM SERPL-SCNC: 4.2 MMOL/L (ref 3.5–5.1)
POTASSIUM SERPL-SCNC: 4.2 MMOL/L (ref 3.5–5.1)
POTASSIUM SERPL-SCNC: 4.3 MMOL/L (ref 3.5–5.1)
POTASSIUM SERPL-SCNC: 4.7 MMOL/L (ref 3.5–5.1)
SODIUM SERPL-SCNC: 124 MMOL/L (ref 136–145)
SODIUM SERPL-SCNC: 124 MMOL/L (ref 136–145)
SODIUM SERPL-SCNC: 127 MMOL/L (ref 136–145)
SODIUM SERPL-SCNC: 128 MMOL/L (ref 136–145)
TOXICOLOGY INFORMATION: NORMAL
TRIGL SERPL-MCNC: >3600 MG/DL (ref 30–150)

## 2023-08-31 PROCEDURE — 36415 COLL VENOUS BLD VENIPUNCTURE: CPT | Performed by: EMERGENCY MEDICINE

## 2023-08-31 PROCEDURE — 21400001 HC TELEMETRY ROOM

## 2023-08-31 PROCEDURE — 84100 ASSAY OF PHOSPHORUS: CPT | Mod: 91 | Performed by: EMERGENCY MEDICINE

## 2023-08-31 PROCEDURE — 36415 COLL VENOUS BLD VENIPUNCTURE: CPT | Performed by: INTERNAL MEDICINE

## 2023-08-31 PROCEDURE — 99232 SBSQ HOSP IP/OBS MODERATE 35: CPT | Mod: ,,, | Performed by: PHYSICIAN ASSISTANT

## 2023-08-31 PROCEDURE — 25000003 PHARM REV CODE 250: Performed by: INTERNAL MEDICINE

## 2023-08-31 PROCEDURE — 80048 BASIC METABOLIC PNL TOTAL CA: CPT | Performed by: PHYSICIAN ASSISTANT

## 2023-08-31 PROCEDURE — 80048 BASIC METABOLIC PNL TOTAL CA: CPT | Mod: 91 | Performed by: PHYSICIAN ASSISTANT

## 2023-08-31 PROCEDURE — 99900035 HC TECH TIME PER 15 MIN (STAT)

## 2023-08-31 PROCEDURE — 80061 LIPID PANEL: CPT | Performed by: EMERGENCY MEDICINE

## 2023-08-31 PROCEDURE — 63600175 PHARM REV CODE 636 W HCPCS: Performed by: INTERNAL MEDICINE

## 2023-08-31 PROCEDURE — 99232 PR SUBSEQUENT HOSPITAL CARE,LEVL II: ICD-10-PCS | Mod: ,,, | Performed by: PHYSICIAN ASSISTANT

## 2023-08-31 PROCEDURE — 36415 COLL VENOUS BLD VENIPUNCTURE: CPT | Performed by: PHYSICIAN ASSISTANT

## 2023-08-31 PROCEDURE — 84100 ASSAY OF PHOSPHORUS: CPT | Mod: 91 | Performed by: PHYSICIAN ASSISTANT

## 2023-08-31 PROCEDURE — 94761 N-INVAS EAR/PLS OXIMETRY MLT: CPT

## 2023-08-31 PROCEDURE — 25000003 PHARM REV CODE 250: Performed by: PHYSICIAN ASSISTANT

## 2023-08-31 PROCEDURE — 83735 ASSAY OF MAGNESIUM: CPT | Performed by: PHYSICIAN ASSISTANT

## 2023-08-31 PROCEDURE — 25000003 PHARM REV CODE 250: Performed by: STUDENT IN AN ORGANIZED HEALTH CARE EDUCATION/TRAINING PROGRAM

## 2023-08-31 PROCEDURE — 83605 ASSAY OF LACTIC ACID: CPT | Performed by: INTERNAL MEDICINE

## 2023-08-31 PROCEDURE — 84100 ASSAY OF PHOSPHORUS: CPT | Performed by: EMERGENCY MEDICINE

## 2023-08-31 RX ORDER — LIDOCAINE 50 MG/G
1 PATCH TOPICAL DAILY
Status: DISCONTINUED | OUTPATIENT
Start: 2023-08-31 | End: 2023-09-04 | Stop reason: HOSPADM

## 2023-08-31 RX ORDER — HYDROCODONE BITARTRATE AND ACETAMINOPHEN 5; 325 MG/1; MG/1
1 TABLET ORAL EVERY 8 HOURS PRN
Status: DISCONTINUED | OUTPATIENT
Start: 2023-09-01 | End: 2023-09-04 | Stop reason: HOSPADM

## 2023-08-31 RX ADMIN — INSULIN ASPART 8 UNITS: 100 INJECTION, SOLUTION INTRAVENOUS; SUBCUTANEOUS at 04:08

## 2023-08-31 RX ADMIN — ACETAMINOPHEN 500 MG: 500 TABLET ORAL at 08:08

## 2023-08-31 RX ADMIN — HYDROCODONE BITARTRATE AND ACETAMINOPHEN 1 TABLET: 5; 325 TABLET ORAL at 11:08

## 2023-08-31 RX ADMIN — SODIUM CHLORIDE: 9 INJECTION, SOLUTION INTRAVENOUS at 11:08

## 2023-08-31 RX ADMIN — SODIUM CHLORIDE: 9 INJECTION, SOLUTION INTRAVENOUS at 06:08

## 2023-08-31 RX ADMIN — ACETAMINOPHEN 500 MG: 500 TABLET ORAL at 01:08

## 2023-08-31 RX ADMIN — INSULIN ASPART 4 UNITS: 100 INJECTION, SOLUTION INTRAVENOUS; SUBCUTANEOUS at 12:08

## 2023-08-31 RX ADMIN — LIDOCAINE 1 PATCH: 50 PATCH CUTANEOUS at 08:08

## 2023-08-31 RX ADMIN — ENOXAPARIN SODIUM 40 MG: 40 INJECTION SUBCUTANEOUS at 04:08

## 2023-08-31 RX ADMIN — INSULIN DETEMIR 4 UNITS: 100 INJECTION, SOLUTION SUBCUTANEOUS at 01:08

## 2023-08-31 RX ADMIN — INSULIN ASPART 4 UNITS: 100 INJECTION, SOLUTION INTRAVENOUS; SUBCUTANEOUS at 08:08

## 2023-08-31 RX ADMIN — POTASSIUM PHOSPHATE, MONOBASIC AND POTASSIUM PHOSPHATE, DIBASIC 15 MMOL: 224; 236 INJECTION, SOLUTION, CONCENTRATE INTRAVENOUS at 12:08

## 2023-08-31 RX ADMIN — MUPIROCIN: 20 OINTMENT TOPICAL at 08:08

## 2023-08-31 RX ADMIN — INSULIN DETEMIR 6 UNITS: 100 INJECTION, SOLUTION SUBCUTANEOUS at 08:08

## 2023-08-31 RX ADMIN — INSULIN ASPART 2 UNITS: 100 INJECTION, SOLUTION INTRAVENOUS; SUBCUTANEOUS at 08:08

## 2023-08-31 NOTE — ASSESSMENT & PLAN NOTE
Patient with acute kidney injury/acute renal failure likely due to pre-renal azotemia due to dehydration POLY is currently improving. Baseline creatinine 0.8 - Labs reviewed- Renal function/electrolytes with Estimated Creatinine Clearance: 106.6 mL/min (based on SCr of 0.7 mg/dL). according to latest data. Monitor urine output and serial BMP and adjust therapy as needed. Avoid nephrotoxins and renally dose meds for GFR listed above.    Resolved

## 2023-08-31 NOTE — ASSESSMENT & PLAN NOTE
Patient's FSGs are controlled on current medication regimen.  Last A1c reviewed-   Lab Results   Component Value Date    HGBA1C 8.5 (H) 08/28/2023     Most recent fingerstick glucose reviewed-   Recent Labs   Lab 08/30/23  1613 08/30/23 2007 08/31/23  0731 08/31/23  1143   POCTGLUCOSE 239* 244* 210* 226*     Current correctional scale on insulin gtt   Maintain anti-hyperglycemic dose as follows-   Antihyperglycemics (From admission, onward)    Start     Stop Route Frequency Ordered    08/31/23 1330  insulin detemir U-100 (Levemir) pen 4 Units         -- SubQ Daily 08/31/23 1320    08/30/23 0922  insulin aspart U-100 pen 0-10 Units         -- SubQ Before meals & nightly PRN 08/30/23 0822        Hold Oral hypoglycemics while patient is in the hospital.    Beta hydroxybutyrate negative, trace ketones  Waiting for closure of anion gap and will start SC insulin and diet.  Likely triggered by steroid use.       8/30 Glucose levels improving.  S/s insulin in place.  Will decrease dextrose 75cc hour and d/c this afternoon.      8/31 Glucose levels improving.  Continue IV fluids.  Determir and s/s ordered

## 2023-08-31 NOTE — SUBJECTIVE & OBJECTIVE
"Past Medical History:   Diagnosis Date    Diabetes mellitus     Thyroid disease     hypothyroidism    Thyroid disease during pregnancy, second trimester 10/18/2019       Past Surgical History:   Procedure Laterality Date     SECTION N/A 2020    Procedure: REPEAT  SECTION;  Surgeon: Onelia Crespo MD;  Location: [facility];  Service: OB/GYN;  Laterality: N/A;    CHOLECYSTECTOMY      DILATION AND CURETTAGE OF UTERUS      FOOT SURGERY Right     has a nail in the foot    SALPINGECTOMY Left 2020    Procedure: SALPINGECTOMY;  Surgeon: Onelia Crespo MD;  Location: [facility];  Service: OB/GYN;  Laterality: Left;    SALPINGOOPHORECTOMY Right 2019    Procedure: RIGHT SALPINGO-OOPHORECTOMY USO;  Surgeon: Mary Iglesias MD;  Location: [facility];  Service: OB/GYN;  Laterality: Right;    TONSILLECTOMY      TUBAL LIGATION         Review of patient's allergies indicates:  No Known Allergies    No current facility-administered medications on file prior to encounter.     Current Outpatient Medications on File Prior to Encounter   Medication Sig    cyclobenzaprine (FLEXERIL) 10 MG tablet Take 10 mg by mouth every 8 (eight) hours.    diclofenac (CATAFLAM) 50 MG tablet Take 50 mg by mouth every 8 (eight) hours as needed (pain). x7 days    predniSONE (DELTASONE) 50 MG Tab Take 50 mg by mouth once daily.    aspirin (ECOTRIN) 81 MG EC tablet Take 81 mg by mouth once daily.    BD INSULIN SYRINGE ULTRA-FINE 0.5 mL 31 gauge x 5/16" Syrg Inject 1 Syringe into the skin once daily.    blood sugar diagnostic (RELION PRIME TEST STRIPS) Strp 1 strip by Misc.(Non-Drug; Combo Route) route 4 (four) times daily.    insulin syringe-needle U-100 (BD INSULIN SYRINGE ULTRA-FINE) 0.3 mL 31 gauge x 5/16" Syrg Use in the evening as directed.    metFORMIN (GLUCOPHAGE) 1000 MG tablet Take 1 tablet (1,000 mg total) by mouth 2 (two) times daily with meals. (Patient not taking: Reported on 2023)    pen needle, " "diabetic 29 gauge x 1/2" Ndle Use four times daily with insulin pens    TRUE METRIX GLUCOSE TEST STRIP Strp     TRUEPLUS LANCETS 28 gauge Misc     [DISCONTINUED] HUMALOG U-100 INSULIN 100 unit/mL injection Inject 5 Units into the skin 2 (two) times daily.      Family History       Problem Relation (Age of Onset)    Breast cancer Maternal Grandmother    COPD Mother    Diabetes Father          Tobacco Use    Smoking status: Former     Current packs/day: 0.50     Average packs/day: 0.5 packs/day for 23.0 years (11.5 ttl pk-yrs)     Types: Cigarettes    Smokeless tobacco: Never   Substance and Sexual Activity    Alcohol use: Not Currently    Drug use: Never     Types: Methamphetamines, Marijuana     Comment: last use mauijuana & meth 2019    Sexual activity: Yes     Partners: Male     Birth control/protection: See Surgical Hx     Comment:      Review of Systems   Constitutional:  Positive for fatigue (improving). Negative for chills and fever.   HENT:  Negative for ear discharge and ear pain.    Eyes:  Negative for pain and discharge.   Respiratory:  Negative for cough and shortness of breath.    Cardiovascular:  Negative for chest pain and leg swelling.   Gastrointestinal:  Negative for nausea (resolved) and vomiting (resolved).   Endocrine: Positive for polydipsia and polyuria. Negative for cold intolerance and heat intolerance.   Genitourinary:  Negative for difficulty urinating and dysuria.   Musculoskeletal:  Negative for joint swelling and myalgias.   Skin:  Negative for rash and wound.   Neurological:  Negative for dizziness and headaches.   Psychiatric/Behavioral:  Negative for agitation and confusion.      Objective:     Vital Signs (Most Recent):  Temp: 97 °F (36.1 °C) (08/31/23 1144)  Pulse: 94 (08/31/23 1200)  Resp: 16 (08/31/23 0721)  BP: 129/71 (08/31/23 1144)  SpO2: 98 % (08/31/23 1144) Vital Signs (24h Range):  Temp:  [97 °F (36.1 °C)-98.8 °F (37.1 °C)] 97 °F (36.1 °C)  Pulse:  [] 94  Resp: " " [12-18] 16  SpO2:  [95 %-100 %] 98 %  BP: (115-133)/(67-76) 129/71     Weight: 76 kg (167 lb 8.8 oz)  Body mass index is 28.76 kg/m².     Physical Exam  Constitutional:       General: She is not in acute distress.  HENT:      Head: Normocephalic and atraumatic.   Eyes:      General:         Right eye: No discharge.         Left eye: No discharge.   Cardiovascular:      Rate and Rhythm: Normal rate and regular rhythm.   Pulmonary:      Effort: Pulmonary effort is normal.      Breath sounds: Normal breath sounds.   Abdominal:      General: There is no distension.      Tenderness: There is no abdominal tenderness.   Musculoskeletal:         General: No swelling or tenderness.      Cervical back: Neck supple. No tenderness.   Skin:     General: Skin is warm and dry.   Neurological:      General: No focal deficit present.      Mental Status: She is alert and oriented to person, place, and time.   Psychiatric:         Mood and Affect: Mood normal.         Behavior: Behavior normal.                Significant Labs: A1C:   Recent Labs   Lab 08/28/23  1732   HGBA1C 8.5*       ABGs:   Recent Labs   Lab 08/30/23  1244   PH 7.390   PCO2 36   HCO3 21.80   BE -2.60*   PO2 77     Bilirubin:   Recent Labs   Lab 08/28/23  1546 08/28/23  1922 08/29/23  1258   BILITOT 0.3 0.2 0.4       Blood Culture: No results for input(s): "LABBLOO" in the last 48 hours.  BMP:   Recent Labs   Lab 08/31/23  0627 08/31/23  1210   * 240*   * 127*   K 4.2 4.7   CL 97  --    CO2 17* 8*   BUN 9 11   CREATININE 0.7 0.7   CALCIUM 8.5* 9.0       CBC:   Recent Labs   Lab 08/30/23  1427   WBC 9.73   HGB 15.2   HCT 40.6          CMP:   Recent Labs   Lab 08/30/23  2130 08/31/23  0015 08/31/23  0627 08/31/23  1210   * 124* 128* 127*   K 4.0 4.3 4.2 4.7   CL 97 96 97  --    CO2 10* 6* 17* 8*   * 229* 235* 240*   BUN 14 12 9 11   CREATININE 0.8 0.7 0.7 0.7   CALCIUM 8.8 8.7 8.5* 9.0   ANIONGAP 20* 22* 14 20*       Cardiac Markers: " "No results for input(s): "CKMB", "MYOGLOBIN", "BNP", "TROPISTAT" in the last 48 hours.  Coagulation: No results for input(s): "PT", "INR", "APTT" in the last 48 hours.  Lactic Acid:   Recent Labs   Lab 08/30/23  1247 08/31/23  0627   LACTATE 3.3* 1.6     Lipase: No results for input(s): "LIPASE" in the last 48 hours.  Lipid Panel: No results for input(s): "CHOL", "HDL", "LDLCALC", "TRIG", "CHOLHDL" in the last 48 hours.  Magnesium:   No results for input(s): "MG" in the last 48 hours.    POCT Glucose:   Recent Labs   Lab 08/30/23 2007 08/31/23  0731 08/31/23  1143   POCTGLUCOSE 244* 210* 226*       Prealbumin: No results for input(s): "PREALBUMIN" in the last 48 hours.  Respiratory Culture: No results for input(s): "GSRESP", "RESPIRATORYC" in the last 48 hours.  Troponin: No results for input(s): "TROPONINI", "TROPONINIHS" in the last 48 hours.  TSH: No results for input(s): "TSH" in the last 4320 hours.  Urine Culture: No results for input(s): "LABURIN" in the last 48 hours.  Urine Studies:   Recent Labs   Lab 08/30/23  1322   COLORU Yellow   APPEARANCEUA Clear   PHUR 6.0   SPECGRAV 1.020   PROTEINUA Negative   GLUCUA 2+*   KETONESU Negative   BILIRUBINUA Negative   OCCULTUA Trace*   NITRITE Negative   UROBILINOGEN Negative   LEUKOCYTESUR Negative         Significant Imaging: I have reviewed all pertinent imaging results/findings within the past 24 hours.  "

## 2023-08-31 NOTE — ASSESSMENT & PLAN NOTE
Anion gap elevated at admission and slowly improving   Will monitor and once closed will transition from insulin gtt to sc insulin.      8/31 Anion gap labile.  Unsure etiology.  Metabolic acidosis work up unrevealing so far.   Will trial determir to see response.  Once anion gap stabilizes she will need close endocrinology follow up.

## 2023-08-31 NOTE — PLAN OF CARE
Problem: Hyperosmolar Hyperglycemic State  Goal: Serum Glucose and Electrolytes Within Desired Range  Outcome: Ongoing, Progressing     Problem: Renal Function Impairment (Acute Kidney Injury/Impairment)  Goal: Effective Renal Function  Outcome: Ongoing, Progressing     Problem: Fluid and Electrolyte Imbalance (Acute Kidney Injury/Impairment)  Goal: Fluid and Electrolyte Balance  Outcome: Ongoing, Progressing     Problem: Diabetes Comorbidity  Goal: Blood Glucose Level Within Targeted Range  Outcome: Ongoing, Progressing

## 2023-08-31 NOTE — PROGRESS NOTES
[facility] Surg ([facility])  [facility] Medicine  Progress Note    Patient Name: Kiana Hardy  MRN: [id number]  Patient Class: IP- Inpatient   Admission Date: 2023  Length of Stay: 3 days  Attending Physician: Clyde Jenkins MD  Primary Care Provider: Grady Billings MD        Subjective:     Principal Problem:Type 2 diabetes mellitus with hyperosmolar hyperglycemic state (HHS)        HPI:  Patient is a 38 year old female with medical history of DM type 2 on metformin and former tobacco use disorder who presented to the ED with polyuria.  Patient states on Thursday she noticed xerostomia.  Then on Saturday she went to urgent care for neck and should pain and was started on po steroids. Yesterday she felt fatigued, noticed polyuria and hyperglycemia.  She says she has not been follow up with her PCP or taking good care of herself.  She only sees a chiropractor for the past 5 months for neck and shoulder pain.  She denies fever, chills, CP, SOB, but had nausea and vomiting that is now resolved.        Admitted for HHS.               Overview/Hospital Course:  Patient taken off insulin gtt yesterday once anion gap closed but continues to remain elevated.  Glucose in better range.  Started on dextrose 1/2 normal saline with s/s insulin but expected patient to be able to go back home on metformin since A1C in the mid 8's.  Starting metabolic acidosis work up.      PT Hd stable on room air. Anion gap fluctuating.  Will continue IV fluids.  Continue sliding scale insulin.  Will continue to monitor.  Metabolic acidosis work up unremarkable so far.  Will trial long acting insulin.  Likely will need insulin at discharge and referral to endocrinology.        Past Medical History:   Diagnosis Date    Diabetes mellitus     Thyroid disease     hypothyroidism    Thyroid disease during pregnancy, second trimester 10/18/2019       Past Surgical History:   Procedure Laterality Date     SECTION N/A  "2020    Procedure: REPEAT  SECTION;  Surgeon: Onelia Crespo MD;  Location: [facility];  Service: OB/GYN;  Laterality: N/A;    CHOLECYSTECTOMY      DILATION AND CURETTAGE OF UTERUS      FOOT SURGERY Right     has a nail in the foot    SALPINGECTOMY Left 2020    Procedure: SALPINGECTOMY;  Surgeon: Onelia Crespo MD;  Location: [facility];  Service: OB/GYN;  Laterality: Left;    SALPINGOOPHORECTOMY Right 2019    Procedure: RIGHT SALPINGO-OOPHORECTOMY USO;  Surgeon: Mary Iglesias MD;  Location: [facility];  Service: OB/GYN;  Laterality: Right;    TONSILLECTOMY      TUBAL LIGATION         Review of patient's allergies indicates:  No Known Allergies    No current facility-administered medications on file prior to encounter.     Current Outpatient Medications on File Prior to Encounter   Medication Sig    cyclobenzaprine (FLEXERIL) 10 MG tablet Take 10 mg by mouth every 8 (eight) hours.    diclofenac (CATAFLAM) 50 MG tablet Take 50 mg by mouth every 8 (eight) hours as needed (pain). x7 days    predniSONE (DELTASONE) 50 MG Tab Take 50 mg by mouth once daily.    aspirin (ECOTRIN) 81 MG EC tablet Take 81 mg by mouth once daily.    BD INSULIN SYRINGE ULTRA-FINE 0.5 mL 31 gauge x 5/16" Syrg Inject 1 Syringe into the skin once daily.    blood sugar diagnostic (RELION PRIME TEST STRIPS) Strp 1 strip by Misc.(Non-Drug; Combo Route) route 4 (four) times daily.    insulin syringe-needle U-100 (BD INSULIN SYRINGE ULTRA-FINE) 0.3 mL 31 gauge x 5/16" Syrg Use in the evening as directed.    metFORMIN (GLUCOPHAGE) 1000 MG tablet Take 1 tablet (1,000 mg total) by mouth 2 (two) times daily with meals. (Patient not taking: Reported on 2023)    pen needle, diabetic 29 gauge x 1/2" Ndle Use four times daily with insulin pens    TRUE METRIX GLUCOSE TEST STRIP Strp     TRUEPLUS LANCETS 28 gauge Misc     [DISCONTINUED] HUMALOG U-100 INSULIN 100 unit/mL injection Inject 5 Units into the " skin 2 (two) times daily.      Family History       Problem Relation (Age of Onset)    Breast cancer Maternal Grandmother    COPD Mother    Diabetes Father          Tobacco Use    Smoking status: Former     Current packs/day: 0.50     Average packs/day: 0.5 packs/day for 23.0 years (11.5 ttl pk-yrs)     Types: Cigarettes    Smokeless tobacco: Never   Substance and Sexual Activity    Alcohol use: Not Currently    Drug use: Never     Types: Methamphetamines, Marijuana     Comment: last use mauijuana & meth 2019    Sexual activity: Yes     Partners: Male     Birth control/protection: See Surgical Hx     Comment:      Review of Systems   Constitutional:  Positive for fatigue (improving). Negative for chills and fever.   HENT:  Negative for ear discharge and ear pain.    Eyes:  Negative for pain and discharge.   Respiratory:  Negative for cough and shortness of breath.    Cardiovascular:  Negative for chest pain and leg swelling.   Gastrointestinal:  Negative for nausea (resolved) and vomiting (resolved).   Endocrine: Positive for polydipsia and polyuria. Negative for cold intolerance and heat intolerance.   Genitourinary:  Negative for difficulty urinating and dysuria.   Musculoskeletal:  Negative for joint swelling and myalgias.   Skin:  Negative for rash and wound.   Neurological:  Negative for dizziness and headaches.   Psychiatric/Behavioral:  Negative for agitation and confusion.      Objective:     Vital Signs (Most Recent):  Temp: 97 °F (36.1 °C) (08/31/23 1144)  Pulse: 94 (08/31/23 1200)  Resp: 16 (08/31/23 0721)  BP: 129/71 (08/31/23 1144)  SpO2: 98 % (08/31/23 1144) Vital Signs (24h Range):  Temp:  [97 °F (36.1 °C)-98.8 °F (37.1 °C)] 97 °F (36.1 °C)  Pulse:  [] 94  Resp:  [12-18] 16  SpO2:  [95 %-100 %] 98 %  BP: (115-133)/(67-76) 129/71     Weight: 76 kg (167 lb 8.8 oz)  Body mass index is 28.76 kg/m².     Physical Exam  Constitutional:       General: She is not in acute distress.  HENT:     "  Head: Normocephalic and atraumatic.   Eyes:      General:         Right eye: No discharge.         Left eye: No discharge.   Cardiovascular:      Rate and Rhythm: Normal rate and regular rhythm.   Pulmonary:      Effort: Pulmonary effort is normal.      Breath sounds: Normal breath sounds.   Abdominal:      General: There is no distension.      Tenderness: There is no abdominal tenderness.   Musculoskeletal:         General: No swelling or tenderness.      Cervical back: Neck supple. No tenderness.   Skin:     General: Skin is warm and dry.   Neurological:      General: No focal deficit present.      Mental Status: She is alert and oriented to person, place, and time.   Psychiatric:         Mood and Affect: Mood normal.         Behavior: Behavior normal.                Significant Labs: A1C:   Recent Labs   Lab 08/28/23  1732   HGBA1C 8.5*       ABGs:   Recent Labs   Lab 08/30/23  1244   PH 7.390   PCO2 36   HCO3 21.80   BE -2.60*   PO2 77     Bilirubin:   Recent Labs   Lab 08/28/23  1546 08/28/23  1922 08/29/23  1258   BILITOT 0.3 0.2 0.4       Blood Culture: No results for input(s): "LABBLOO" in the last 48 hours.  BMP:   Recent Labs   Lab 08/31/23  0627 08/31/23  1210   * 240*   * 127*   K 4.2 4.7   CL 97  --    CO2 17* 8*   BUN 9 11   CREATININE 0.7 0.7   CALCIUM 8.5* 9.0       CBC:   Recent Labs   Lab 08/30/23  1427   WBC 9.73   HGB 15.2   HCT 40.6          CMP:   Recent Labs   Lab 08/30/23  2130 08/31/23  0015 08/31/23  0627 08/31/23  1210   * 124* 128* 127*   K 4.0 4.3 4.2 4.7   CL 97 96 97  --    CO2 10* 6* 17* 8*   * 229* 235* 240*   BUN 14 12 9 11   CREATININE 0.8 0.7 0.7 0.7   CALCIUM 8.8 8.7 8.5* 9.0   ANIONGAP 20* 22* 14 20*       Cardiac Markers: No results for input(s): "CKMB", "MYOGLOBIN", "BNP", "TROPISTAT" in the last 48 hours.  Coagulation: No results for input(s): "PT", "INR", "APTT" in the last 48 hours.  Lactic Acid:   Recent Labs   Lab 08/30/23  1247 " "08/31/23  0627   LACTATE 3.3* 1.6     Lipase: No results for input(s): "LIPASE" in the last 48 hours.  Lipid Panel: No results for input(s): "CHOL", "HDL", "LDLCALC", "TRIG", "CHOLHDL" in the last 48 hours.  Magnesium:   No results for input(s): "MG" in the last 48 hours.    POCT Glucose:   Recent Labs   Lab 08/30/23 2007 08/31/23 0731 08/31/23  1143   POCTGLUCOSE 244* 210* 226*       Prealbumin: No results for input(s): "PREALBUMIN" in the last 48 hours.  Respiratory Culture: No results for input(s): "GSRESP", "RESPIRATORYC" in the last 48 hours.  Troponin: No results for input(s): "TROPONINI", "TROPONINIHS" in the last 48 hours.  TSH: No results for input(s): "TSH" in the last 4320 hours.  Urine Culture: No results for input(s): "LABURIN" in the last 48 hours.  Urine Studies:   Recent Labs   Lab 08/30/23  1322   COLORU Yellow   APPEARANCEUA Clear   PHUR 6.0   SPECGRAV 1.020   PROTEINUA Negative   GLUCUA 2+*   KETONESU Negative   BILIRUBINUA Negative   OCCULTUA Trace*   NITRITE Negative   UROBILINOGEN Negative   LEUKOCYTESUR Negative         Significant Imaging: I have reviewed all pertinent imaging results/findings within the past 24 hours.      Assessment/Plan:      * Type 2 diabetes mellitus with hyperosmolar hyperglycemic state (HHS)  Patient's FSGs are controlled on current medication regimen.  Last A1c reviewed-   Lab Results   Component Value Date    HGBA1C 8.5 (H) 08/28/2023     Most recent fingerstick glucose reviewed-   Recent Labs   Lab 08/30/23  1613 08/30/23 2007 08/31/23 0731 08/31/23  1143   POCTGLUCOSE 239* 244* 210* 226*     Current correctional scale on insulin gtt   Maintain anti-hyperglycemic dose as follows-   Antihyperglycemics (From admission, onward)    Start     Stop Route Frequency Ordered    08/31/23 1330  insulin detemir U-100 (Levemir) pen 4 Units         -- SubQ Daily 08/31/23 1320    08/30/23 0922  insulin aspart U-100 pen 0-10 Units         -- SubQ Before meals & nightly PRN " 08/30/23 0822        Hold Oral hypoglycemics while patient is in the hospital.    Beta hydroxybutyrate negative, trace ketones  Waiting for closure of anion gap and will start SC insulin and diet.  Likely triggered by steroid use.       8/30 Glucose levels improving.  S/s insulin in place.  Will decrease dextrose 75cc hour and d/c this afternoon.      8/31 Glucose levels improving.  Continue IV fluids.  Determir and s/s ordered     Electrolyte abnormality  Phosphorus 1.4  IV phosphorus replacement       Metabolic acidosis due to diabetes mellitus  Anion gap elevated at admission and slowly improving   Will monitor and once closed will transition from insulin gtt to sc insulin.      8/31 Anion gap labile.  Unsure etiology.  Metabolic acidosis work up unrevealing so far.   Will trial determir to see response.  Once anion gap stabilizes she will need close endocrinology follow up.      POLY (acute kidney injury)  Patient with acute kidney injury/acute renal failure likely due to pre-renal azotemia due to dehydration POLY is currently improving. Baseline creatinine 0.8 - Labs reviewed- Renal function/electrolytes with Estimated Creatinine Clearance: 106.6 mL/min (based on SCr of 0.7 mg/dL). according to latest data. Monitor urine output and serial BMP and adjust therapy as needed. Avoid nephrotoxins and renally dose meds for GFR listed above.    Resolved     Former smoker  In remission         VTE Risk Mitigation (From admission, onward)         Ordered     enoxaparin injection 40 mg  Every 24 hours         08/28/23 2040     IP VTE HIGH RISK PATIENT  Once         08/28/23 2105     Place sequential compression device  Until discontinued         08/28/23 2105     Place sequential compression device  Until discontinued         08/28/23 1902     Place sequential compression device  Until discontinued         08/28/23 1853     Place sequential compression device  Until discontinued         08/28/23 1853                 Discharge Planning   DORON:      Code Status: Full Code   Is the patient medically ready for discharge?:     Reason for patient still in hospital (select all that apply): Patient trending condition  Discharge Plan A: Home with family, Home                  Misty Pack PA-C  Department of Hospital Medicine   [place] [facility] ([facility])

## 2023-09-01 PROBLEM — M54.12 CERVICAL RADICULOPATHY: Status: ACTIVE | Noted: 2023-09-01

## 2023-09-01 PROBLEM — E78.1 HYPERTRIGLYCERIDEMIA: Status: ACTIVE | Noted: 2023-09-01

## 2023-09-01 LAB
ALLENS TEST: ABNORMAL
ANION GAP SERPL CALC-SCNC: 19 MMOL/L (ref 8–16)
ANION GAP SERPL CALC-SCNC: 21 MMOL/L (ref 8–16)
ANION GAP SERPL CALC-SCNC: 22 MMOL/L (ref 8–16)
ANION GAP SERPL CALC-SCNC: 25 MMOL/L (ref 8–16)
B-OH-BUTYR BLD STRIP-SCNC: 0.1 MMOL/L (ref 0–0.5)
BUN SERPL-MCNC: 10 MG/DL (ref 6–20)
BUN SERPL-MCNC: 7 MG/DL (ref 6–20)
BUN SERPL-MCNC: 8 MG/DL (ref 6–20)
BUN SERPL-MCNC: 8 MG/DL (ref 6–20)
CALCIUM SERPL-MCNC: 8.6 MG/DL (ref 8.7–10.5)
CALCIUM SERPL-MCNC: 8.7 MG/DL (ref 8.7–10.5)
CALCIUM SERPL-MCNC: 9.3 MG/DL (ref 8.7–10.5)
CALCIUM SERPL-MCNC: 9.4 MG/DL (ref 8.7–10.5)
CHLORIDE SERPL-SCNC: 93 MMOL/L (ref 95–110)
CHLORIDE SERPL-SCNC: 98 MMOL/L (ref 95–110)
CHLORIDE SERPL-SCNC: 98 MMOL/L (ref 95–110)
CHLORIDE SERPL-SCNC: 99 MMOL/L (ref 95–110)
CO2 SERPL-SCNC: 13 MMOL/L (ref 23–29)
CO2 SERPL-SCNC: 5 MMOL/L (ref 23–29)
CO2 SERPL-SCNC: 6 MMOL/L (ref 23–29)
CO2 SERPL-SCNC: 9 MMOL/L (ref 23–29)
CREAT SERPL-MCNC: 0.7 MG/DL (ref 0.5–1.4)
DELSYS: ABNORMAL
EST. GFR  (NO RACE VARIABLE): >60 ML/MIN/1.73 M^2
FIO2: 21 (ref 21–100)
GLUCOSE SERPL-MCNC: 207 MG/DL (ref 70–110)
GLUCOSE SERPL-MCNC: 208 MG/DL (ref 70–110)
GLUCOSE SERPL-MCNC: 247 MG/DL (ref 70–110)
GLUCOSE SERPL-MCNC: 259 MG/DL (ref 70–110)
HCO3 UR-SCNC: 24.6 MMOL/L
LACTATE SERPL-SCNC: 2.3 MMOL/L (ref 0.5–2.2)
LIPASE SERPL-CCNC: 58 U/L (ref 4–60)
LIPASE SERPL-CCNC: 96 U/L (ref 4–60)
PCO2 BLDA: 38 MMHG (ref 35–45)
PH SMN: 7.42 [PH] (ref 7.35–7.45)
PHOSPHATE SERPL-MCNC: 1.3 MG/DL (ref 2.7–4.5)
PHOSPHATE SERPL-MCNC: 1.5 MG/DL (ref 2.7–4.5)
PO2 BLDA: 66 MMHG (ref 75–100)
POC BE: 0.3 MMOL/L (ref -2–2)
POC COHB: ABNORMAL
POC METHB: ABNORMAL
POC O2HB ARTERIAL: ABNORMAL
POC SATURATED O2: 93.1 % (ref 90–100)
POC TCO2: 25.8 MMOL/L
POC THB: ABNORMAL
POCT GLUCOSE: 149 MG/DL (ref 70–110)
POCT GLUCOSE: 204 MG/DL (ref 70–110)
POCT GLUCOSE: 205 MG/DL (ref 70–110)
POCT GLUCOSE: 210 MG/DL (ref 70–110)
POCT GLUCOSE: 216 MG/DL (ref 70–110)
POCT GLUCOSE: 217 MG/DL (ref 70–110)
POCT GLUCOSE: 228 MG/DL (ref 70–110)
POCT GLUCOSE: 230 MG/DL (ref 70–110)
POCT GLUCOSE: 233 MG/DL (ref 70–110)
POCT GLUCOSE: 246 MG/DL (ref 70–110)
POCT GLUCOSE: 250 MG/DL (ref 70–110)
POTASSIUM SERPL-SCNC: 3.8 MMOL/L (ref 3.5–5.1)
POTASSIUM SERPL-SCNC: 3.9 MMOL/L (ref 3.5–5.1)
POTASSIUM SERPL-SCNC: 3.9 MMOL/L (ref 3.5–5.1)
POTASSIUM SERPL-SCNC: 4 MMOL/L (ref 3.5–5.1)
SITE: ABNORMAL
SODIUM SERPL-SCNC: 123 MMOL/L (ref 136–145)
SODIUM SERPL-SCNC: 126 MMOL/L (ref 136–145)
SODIUM SERPL-SCNC: 128 MMOL/L (ref 136–145)
SODIUM SERPL-SCNC: 131 MMOL/L (ref 136–145)
TRIGL SERPL-MCNC: 2705 MG/DL (ref 30–150)
TROPONIN I SERPL DL<=0.01 NG/ML-MCNC: <0.006 NG/ML (ref 0–0.03)

## 2023-09-01 PROCEDURE — 83690 ASSAY OF LIPASE: CPT | Performed by: STUDENT IN AN ORGANIZED HEALTH CARE EDUCATION/TRAINING PROGRAM

## 2023-09-01 PROCEDURE — 25000003 PHARM REV CODE 250: Performed by: INTERNAL MEDICINE

## 2023-09-01 PROCEDURE — 83605 ASSAY OF LACTIC ACID: CPT | Performed by: PHYSICIAN ASSISTANT

## 2023-09-01 PROCEDURE — 82010 KETONE BODYS QUAN: CPT | Performed by: PHYSICIAN ASSISTANT

## 2023-09-01 PROCEDURE — 36415 COLL VENOUS BLD VENIPUNCTURE: CPT | Performed by: STUDENT IN AN ORGANIZED HEALTH CARE EDUCATION/TRAINING PROGRAM

## 2023-09-01 PROCEDURE — 36415 COLL VENOUS BLD VENIPUNCTURE: CPT | Performed by: FAMILY MEDICINE

## 2023-09-01 PROCEDURE — 99233 PR SUBSEQUENT HOSPITAL CARE,LEVL III: ICD-10-PCS | Mod: ,,, | Performed by: PHYSICIAN ASSISTANT

## 2023-09-01 PROCEDURE — 25000003 PHARM REV CODE 250: Performed by: STUDENT IN AN ORGANIZED HEALTH CARE EDUCATION/TRAINING PROGRAM

## 2023-09-01 PROCEDURE — 63600175 PHARM REV CODE 636 W HCPCS: Performed by: STUDENT IN AN ORGANIZED HEALTH CARE EDUCATION/TRAINING PROGRAM

## 2023-09-01 PROCEDURE — 80048 BASIC METABOLIC PNL TOTAL CA: CPT | Performed by: PHYSICIAN ASSISTANT

## 2023-09-01 PROCEDURE — 80048 BASIC METABOLIC PNL TOTAL CA: CPT | Mod: 91 | Performed by: FAMILY MEDICINE

## 2023-09-01 PROCEDURE — 63600175 PHARM REV CODE 636 W HCPCS: Performed by: PHYSICIAN ASSISTANT

## 2023-09-01 PROCEDURE — 20000000 HC ICU ROOM

## 2023-09-01 PROCEDURE — 84100 ASSAY OF PHOSPHORUS: CPT | Performed by: PHYSICIAN ASSISTANT

## 2023-09-01 PROCEDURE — 84100 ASSAY OF PHOSPHORUS: CPT | Mod: 91 | Performed by: PHYSICIAN ASSISTANT

## 2023-09-01 PROCEDURE — 82803 BLOOD GASES ANY COMBINATION: CPT | Performed by: PHYSICIAN ASSISTANT

## 2023-09-01 PROCEDURE — 25000003 PHARM REV CODE 250: Performed by: PHYSICIAN ASSISTANT

## 2023-09-01 PROCEDURE — 99233 PR SUBSEQUENT HOSPITAL CARE,LEVL III: ICD-10-PCS | Mod: ,,, | Performed by: FAMILY MEDICINE

## 2023-09-01 PROCEDURE — 99233 SBSQ HOSP IP/OBS HIGH 50: CPT | Mod: ,,, | Performed by: PHYSICIAN ASSISTANT

## 2023-09-01 PROCEDURE — 99233 SBSQ HOSP IP/OBS HIGH 50: CPT | Mod: ,,, | Performed by: FAMILY MEDICINE

## 2023-09-01 PROCEDURE — 99900035 HC TECH TIME PER 15 MIN (STAT)

## 2023-09-01 PROCEDURE — 83690 ASSAY OF LIPASE: CPT | Mod: 91 | Performed by: PHYSICIAN ASSISTANT

## 2023-09-01 PROCEDURE — 99900031 HC PATIENT EDUCATION (STAT)

## 2023-09-01 PROCEDURE — 84478 ASSAY OF TRIGLYCERIDES: CPT | Performed by: PHYSICIAN ASSISTANT

## 2023-09-01 PROCEDURE — 80048 BASIC METABOLIC PNL TOTAL CA: CPT | Mod: 91 | Performed by: PHYSICIAN ASSISTANT

## 2023-09-01 PROCEDURE — 63600175 PHARM REV CODE 636 W HCPCS: Performed by: INTERNAL MEDICINE

## 2023-09-01 PROCEDURE — 94761 N-INVAS EAR/PLS OXIMETRY MLT: CPT

## 2023-09-01 PROCEDURE — 36600 WITHDRAWAL OF ARTERIAL BLOOD: CPT

## 2023-09-01 PROCEDURE — 84484 ASSAY OF TROPONIN QUANT: CPT | Performed by: STUDENT IN AN ORGANIZED HEALTH CARE EDUCATION/TRAINING PROGRAM

## 2023-09-01 PROCEDURE — 36415 COLL VENOUS BLD VENIPUNCTURE: CPT | Performed by: PHYSICIAN ASSISTANT

## 2023-09-01 RX ORDER — DEXTROSE MONOHYDRATE, SODIUM CHLORIDE, AND POTASSIUM CHLORIDE 50; 1.49; 4.5 G/1000ML; G/1000ML; G/1000ML
INJECTION, SOLUTION INTRAVENOUS CONTINUOUS
Status: DISCONTINUED | OUTPATIENT
Start: 2023-09-01 | End: 2023-09-04 | Stop reason: HOSPADM

## 2023-09-01 RX ORDER — ASPIRIN 81 MG/1
81 TABLET ORAL DAILY
Status: DISCONTINUED | OUTPATIENT
Start: 2023-09-01 | End: 2023-09-04 | Stop reason: HOSPADM

## 2023-09-01 RX ORDER — GABAPENTIN 100 MG/1
100 CAPSULE ORAL DAILY
Status: DISCONTINUED | OUTPATIENT
Start: 2023-09-01 | End: 2023-09-04 | Stop reason: HOSPADM

## 2023-09-01 RX ORDER — OMEGA-3-ACID ETHYL ESTERS 1 G/1
1 CAPSULE, LIQUID FILLED ORAL DAILY
Status: DISCONTINUED | OUTPATIENT
Start: 2023-09-01 | End: 2023-09-04 | Stop reason: HOSPADM

## 2023-09-01 RX ORDER — FENOFIBRATE 48 MG/1
48 TABLET, FILM COATED ORAL DAILY
Status: DISCONTINUED | OUTPATIENT
Start: 2023-09-01 | End: 2023-09-04 | Stop reason: HOSPADM

## 2023-09-01 RX ORDER — ONDANSETRON 2 MG/ML
4 INJECTION INTRAMUSCULAR; INTRAVENOUS EVERY 8 HOURS PRN
Status: DISCONTINUED | OUTPATIENT
Start: 2023-09-01 | End: 2023-09-04 | Stop reason: HOSPADM

## 2023-09-01 RX ORDER — MORPHINE SULFATE 4 MG/ML
4 INJECTION, SOLUTION INTRAMUSCULAR; INTRAVENOUS ONCE
Status: COMPLETED | OUTPATIENT
Start: 2023-09-01 | End: 2023-09-01

## 2023-09-01 RX ORDER — SODIUM CHLORIDE 9 MG/ML
INJECTION, SOLUTION INTRAVENOUS CONTINUOUS
Status: DISCONTINUED | OUTPATIENT
Start: 2023-09-01 | End: 2023-09-01

## 2023-09-01 RX ADMIN — SODIUM CHLORIDE: 9 INJECTION, SOLUTION INTRAVENOUS at 06:09

## 2023-09-01 RX ADMIN — GABAPENTIN 100 MG: 100 CAPSULE ORAL at 08:09

## 2023-09-01 RX ADMIN — LIDOCAINE 1 PATCH: 50 PATCH CUTANEOUS at 08:09

## 2023-09-01 RX ADMIN — ONDANSETRON HYDROCHLORIDE 4 MG: 2 INJECTION, SOLUTION INTRAMUSCULAR; INTRAVENOUS at 10:09

## 2023-09-01 RX ADMIN — DEXTROSE, SODIUM CHLORIDE, AND POTASSIUM CHLORIDE: 5; .45; .15 INJECTION INTRAVENOUS at 06:09

## 2023-09-01 RX ADMIN — HYDROCODONE BITARTRATE AND ACETAMINOPHEN 1 TABLET: 5; 325 TABLET ORAL at 10:09

## 2023-09-01 RX ADMIN — MUPIROCIN: 20 OINTMENT TOPICAL at 08:09

## 2023-09-01 RX ADMIN — MORPHINE SULFATE 4 MG: 4 INJECTION INTRAVENOUS at 01:09

## 2023-09-01 RX ADMIN — ENOXAPARIN SODIUM 40 MG: 40 INJECTION SUBCUTANEOUS at 04:09

## 2023-09-01 RX ADMIN — INSULIN ASPART 4 UNITS: 100 INJECTION, SOLUTION INTRAVENOUS; SUBCUTANEOUS at 12:09

## 2023-09-01 RX ADMIN — ASPIRIN 81 MG: 81 TABLET, COATED ORAL at 08:09

## 2023-09-01 RX ADMIN — OMEGA-3-ACID ETHYL ESTERS 1 G: 1 CAPSULE, LIQUID FILLED ORAL at 08:09

## 2023-09-01 RX ADMIN — INSULIN DETEMIR 4 UNITS: 100 INJECTION, SOLUTION SUBCUTANEOUS at 08:09

## 2023-09-01 RX ADMIN — MUPIROCIN: 20 OINTMENT TOPICAL at 10:09

## 2023-09-01 RX ADMIN — INSULIN ASPART 4 UNITS: 100 INJECTION, SOLUTION INTRAVENOUS; SUBCUTANEOUS at 08:09

## 2023-09-01 RX ADMIN — SODIUM PHOSPHATE, MONOBASIC, MONOHYDRATE AND SODIUM PHOSPHATE, DIBASIC, ANHYDROUS 20.01 MMOL: 276; 142 INJECTION, SOLUTION INTRAVENOUS at 09:09

## 2023-09-01 RX ADMIN — ONDANSETRON HYDROCHLORIDE 4 MG: 2 INJECTION, SOLUTION INTRAMUSCULAR; INTRAVENOUS at 12:09

## 2023-09-01 RX ADMIN — FENOFIBRATE 48 MG: 48 TABLET ORAL at 08:09

## 2023-09-01 NOTE — ASSESSMENT & PLAN NOTE
Anion gap elevated at admission and slowly improving   Will monitor and once closed will transition from insulin gtt to sc insulin.      8/31 Anion gap labile.  Unsure etiology.  Metabolic acidosis work up unrevealing so far.   Will trial determir to see response.  Once anion gap stabilizes she will need close endocrinology follow up.      9/1 Bicarb low but possibly due to hypertriglyceridemia.

## 2023-09-01 NOTE — ASSESSMENT & PLAN NOTE
Patient's FSGs are controlled on current medication regimen.  Last A1c reviewed-   Lab Results   Component Value Date    HGBA1C 8.5 (H) 08/28/2023     Most recent fingerstick glucose reviewed-   Recent Labs   Lab 08/31/23  1634 08/31/23  2018 09/01/23  0732 09/01/23  1141   POCTGLUCOSE 310* 224* 210* 230*     Current correctional scale on insulin gtt   Maintain anti-hyperglycemic dose as follows-   Antihyperglycemics (From admission, onward)    Start     Stop Route Frequency Ordered    08/31/23 1330  insulin detemir U-100 (Levemir) pen 4 Units         -- SubQ Daily 08/31/23 1320    08/30/23 0922  insulin aspart U-100 pen 0-10 Units         -- SubQ Before meals & nightly PRN 08/30/23 0822        Hold Oral hypoglycemics while patient is in the hospital.    Beta hydroxybutyrate negative, trace ketones  Waiting for closure of anion gap and will start SC insulin and diet.  Likely triggered by steroid use.       8/30 Glucose levels improving.  S/s insulin in place.  Will decrease dextrose 75cc hour and d/c this afternoon.      8/31 Glucose levels improving.  Continue IV fluids.  Determir and s/s ordered

## 2023-09-01 NOTE — PROGRESS NOTES
[facility] ([facility])  [facility] Medicine  Progress Note    Patient Name: Kiana Hardy  MRN: [id number]  Patient Class: IP- Inpatient   Admission Date: 8/28/2023  Length of Stay: 4 days  Attending Physician: Clyde Jenkins MD  Primary Care Provider: Grady Billings MD        Subjective:     Principal Problem:Type 2 diabetes mellitus with hyperosmolar hyperglycemic state (HHS)        HPI:  Patient is a 38 year old female with medical history of DM type 2 on metformin and former tobacco use disorder who presented to the ED with polyuria.  Patient states on Thursday she noticed xerostomia.  Then on Saturday she went to urgent care for neck and should pain and was started on po steroids. Yesterday she felt fatigued, noticed polyuria and hyperglycemia.  She says she has not been follow up with her PCP or taking good care of herself.  She only sees a chiropractor for the past 5 months for neck and shoulder pain.  She denies fever, chills, CP, SOB, but had nausea and vomiting that is now resolved.        Admitted for HHS.               Overview/Hospital Course:  Patient taken off insulin gtt yesterday once anion gap closed but continues to remain elevated.  Glucose in better range.  Started on dextrose 1/2 normal saline with s/s insulin but expected patient to be able to go back home on metformin since A1C in the mid 8's.  Starting metabolic acidosis work up.      PT Hd stable on room air. Anion gap fluctuating.  Will continue IV fluids.  Continue sliding scale insulin.  Will continue to monitor.  Metabolic acidosis work up unremarkable so far.  Will trial long acting insulin.  Likely will need insulin at discharge and referral to endocrinology.      PT HD stable on room air.  She is having excruciating neck pain.  Attempting to get MRI since pain is worse.  Xray of right foot first due to h/o nail in foot.  Gabapentin started for pain.  Started on fenofibrate, omega 3 and dietician consulted for  "hypertriglyceridemia.  Likely etiology of abnormal bicarbs.  Lipase trending down.        Past Medical History:   Diagnosis Date    Diabetes mellitus     Thyroid disease     hypothyroidism    Thyroid disease during pregnancy, second trimester 10/18/2019       Past Surgical History:   Procedure Laterality Date     SECTION N/A 2020    Procedure: REPEAT  SECTION;  Surgeon: Onelia Crespo MD;  Location: [facility];  Service: OB/GYN;  Laterality: N/A;    CHOLECYSTECTOMY      DILATION AND CURETTAGE OF UTERUS      FOOT SURGERY Right     has a nail in the foot    SALPINGECTOMY Left 2020    Procedure: SALPINGECTOMY;  Surgeon: Onelia Crespo MD;  Location: [facility];  Service: OB/GYN;  Laterality: Left;    SALPINGOOPHORECTOMY Right 2019    Procedure: RIGHT SALPINGO-OOPHORECTOMY USO;  Surgeon: Mary Iglesias MD;  Location: [facility];  Service: OB/GYN;  Laterality: Right;    TONSILLECTOMY      TUBAL LIGATION         Review of patient's allergies indicates:  No Known Allergies    No current facility-administered medications on file prior to encounter.     Current Outpatient Medications on File Prior to Encounter   Medication Sig    cyclobenzaprine (FLEXERIL) 10 MG tablet Take 10 mg by mouth every 8 (eight) hours.    diclofenac (CATAFLAM) 50 MG tablet Take 50 mg by mouth every 8 (eight) hours as needed (pain). x7 days    predniSONE (DELTASONE) 50 MG Tab Take 50 mg by mouth once daily.    aspirin (ECOTRIN) 81 MG EC tablet Take 81 mg by mouth once daily.    BD INSULIN SYRINGE ULTRA-FINE 0.5 mL 31 gauge x 5/16" Syrg Inject 1 Syringe into the skin once daily.    blood sugar diagnostic (RELION PRIME TEST STRIPS) Strp 1 strip by Misc.(Non-Drug; Combo Route) route 4 (four) times daily.    insulin syringe-needle U-100 (BD INSULIN SYRINGE ULTRA-FINE) 0.3 mL 31 gauge x 5/16" Syrg Use in the evening as directed.    metFORMIN (GLUCOPHAGE) 1000 MG tablet Take 1 tablet (1,000 mg " "total) by mouth 2 (two) times daily with meals. (Patient not taking: Reported on 8/28/2023)    pen needle, diabetic 29 gauge x 1/2" Ndle Use four times daily with insulin pens    TRUE METRIX GLUCOSE TEST STRIP Strp     TRUEPLUS LANCETS 28 gauge Misc     [DISCONTINUED] HUMALOG U-100 INSULIN 100 unit/mL injection Inject 5 Units into the skin 2 (two) times daily.      Family History       Problem Relation (Age of Onset)    Breast cancer Maternal Grandmother    COPD Mother    Diabetes Father          Tobacco Use    Smoking status: Former     Current packs/day: 0.50     Average packs/day: 0.5 packs/day for 23.0 years (11.5 ttl pk-yrs)     Types: Cigarettes    Smokeless tobacco: Never   Substance and Sexual Activity    Alcohol use: Not Currently    Drug use: Never     Types: Methamphetamines, Marijuana     Comment: last use mauijuana & meth 2019    Sexual activity: Yes     Partners: Male     Birth control/protection: See Surgical Hx     Comment:      Review of Systems   Constitutional:  Positive for fatigue (improving). Negative for chills and fever.   HENT:  Negative for ear discharge and ear pain.    Eyes:  Negative for pain and discharge.   Respiratory:  Negative for cough and shortness of breath.    Cardiovascular:  Negative for chest pain and leg swelling.   Gastrointestinal:  Negative for nausea (resolved) and vomiting (resolved).   Endocrine: Positive for polydipsia and polyuria. Negative for cold intolerance and heat intolerance.   Genitourinary:  Negative for difficulty urinating and dysuria.   Musculoskeletal:  Positive for neck pain and neck stiffness. Negative for joint swelling and myalgias.   Skin:  Negative for rash and wound.   Neurological:  Negative for dizziness and headaches.   Psychiatric/Behavioral:  Negative for agitation and confusion.      Objective:     Vital Signs (Most Recent):  Temp: 97.9 °F (36.6 °C) (09/01/23 1144)  Pulse: 81 (09/01/23 1400)  Resp: 18 (09/01/23 1144)  BP: " "(!) 142/91 (09/01/23 1238)  SpO2: 100 % (09/01/23 1144) Vital Signs (24h Range):  Temp:  [96 °F (35.6 °C)-98.4 °F (36.9 °C)] 97.9 °F (36.6 °C)  Pulse:  [61-89] 81  Resp:  [18-26] 18  SpO2:  [97 %-100 %] 100 %  BP: (116-153)/(63-95) 142/91     Weight: 76 kg (167 lb 8.8 oz)  Body mass index is 28.76 kg/m².     Physical Exam  Constitutional:       General: She is in acute distress (neck pain).   HENT:      Head: Normocephalic and atraumatic.   Eyes:      General:         Right eye: No discharge.         Left eye: No discharge.   Cardiovascular:      Rate and Rhythm: Normal rate and regular rhythm.   Pulmonary:      Effort: Pulmonary effort is normal.      Breath sounds: Normal breath sounds.   Abdominal:      General: There is no distension.      Tenderness: There is no abdominal tenderness.   Musculoskeletal:         General: No swelling or tenderness. Normal range of motion.      Cervical back: Neck supple. No tenderness.   Skin:     General: Skin is warm and dry.   Neurological:      General: No focal deficit present.      Mental Status: She is alert and oriented to person, place, and time.   Psychiatric:         Mood and Affect: Mood normal.         Behavior: Behavior normal.                Significant Labs: A1C:   Recent Labs   Lab 08/28/23  1732   HGBA1C 8.5*       ABGs:   Recent Labs   Lab 09/01/23  1220   PH 7.420   PCO2 38   HCO3 24.60   POCSATURATED 93.1   BE 0.30   PO2 66*       Bilirubin:   Recent Labs   Lab 08/28/23  1546 08/28/23  1922 08/29/23  1258   BILITOT 0.3 0.2 0.4       Blood Culture: No results for input(s): "LABBLOO" in the last 48 hours.  BMP:   Recent Labs   Lab 08/31/23  1813 09/01/23  0011 09/01/23  1227   *   < > 259*   *   < > 123*   K 4.2   < > 4.0   CL 95   < > 93*   CO2 10*   < > 9*   BUN 11   < > 8   CREATININE 0.8   < > 0.7   CALCIUM 8.9   < > 9.3   MG 1.8  --   --     < > = values in this interval not displayed.       CBC:   Recent Labs   Lab 08/30/23  1427   WBC 9.73 " "  HGB 15.2   HCT 40.6          CMP:   Recent Labs   Lab 09/01/23  0011 09/01/23  0552 09/01/23  1227   * 126* 123*   K 3.9 3.8 4.0   CL 98 98 93*   CO2 5* 6* 9*   * 208* 259*   BUN 10 7 8   CREATININE 0.7 0.7 0.7   CALCIUM 8.7 8.6* 9.3   ANIONGAP 25* 22* 21*       Cardiac Markers: No results for input(s): "CKMB", "MYOGLOBIN", "BNP", "TROPISTAT" in the last 48 hours.  Coagulation: No results for input(s): "PT", "INR", "APTT" in the last 48 hours.  Lactic Acid:   Recent Labs   Lab 08/31/23  0627 09/01/23  1227   LACTATE 1.6 2.3*       Lipase:   Recent Labs   Lab 09/01/23  0552 09/01/23  1227   LIPASE 96* 58     Lipid Panel:   Recent Labs   Lab 08/31/23  1210   CHOL 596*   HDL 23*   LDLCALC Invalid, Trig>400.0   TRIG >3,600*   CHOLHDL 3.9*     Magnesium:   Recent Labs   Lab 08/31/23  1813   MG 1.8       POCT Glucose:   Recent Labs   Lab 08/31/23  2018 09/01/23  0732 09/01/23  1141   POCTGLUCOSE 224* 210* 230*       Prealbumin: No results for input(s): "PREALBUMIN" in the last 48 hours.  Respiratory Culture: No results for input(s): "GSRESP", "RESPIRATORYC" in the last 48 hours.  Troponin:   Recent Labs   Lab 09/01/23  0552   TROPONINI <0.006     TSH: No results for input(s): "TSH" in the last 4320 hours.  Urine Culture: No results for input(s): "LABURIN" in the last 48 hours.  Urine Studies:   No results for input(s): "COLORU", "APPEARANCEUA", "PHUR", "SPECGRAV", "PROTEINUA", "GLUCUA", "KETONESU", "BILIRUBINUA", "OCCULTUA", "NITRITE", "UROBILINOGEN", "LEUKOCYTESUR", "RBCUA", "WBCUA", "BACTERIA", "SQUAMEPITHEL", "HYALINECASTS" in the last 48 hours.    Invalid input(s): "WRIGHTSUR"      Significant Imaging: I have reviewed all pertinent imaging results/findings within the past 24 hours.      Assessment/Plan:      * Type 2 diabetes mellitus with hyperosmolar hyperglycemic state (HHS)  Patient's FSGs are controlled on current medication regimen.  Last A1c reviewed-   Lab Results   Component Value Date "    HGBA1C 8.5 (H) 08/28/2023     Most recent fingerstick glucose reviewed-   Recent Labs   Lab 08/31/23  1634 08/31/23  2018 09/01/23  0732 09/01/23  1141   POCTGLUCOSE 310* 224* 210* 230*     Current correctional scale on insulin gtt   Maintain anti-hyperglycemic dose as follows-   Antihyperglycemics (From admission, onward)    Start     Stop Route Frequency Ordered    08/31/23 1330  insulin detemir U-100 (Levemir) pen 4 Units         -- SubQ Daily 08/31/23 1320    08/30/23 0922  insulin aspart U-100 pen 0-10 Units         -- SubQ Before meals & nightly PRN 08/30/23 0822        Hold Oral hypoglycemics while patient is in the hospital.    Beta hydroxybutyrate negative, trace ketones  Waiting for closure of anion gap and will start SC insulin and diet.  Likely triggered by steroid use.       8/30 Glucose levels improving.  S/s insulin in place.  Will decrease dextrose 75cc hour and d/c this afternoon.      8/31 Glucose levels improving.  Continue IV fluids.  Determir and s/s ordered     Cervical radiculopathy  Seen on cervical xray  Pain is worse than baseline  MRI c spine pending   Gabapentin started     Hypertriglyceridemia  >3k  Fenofibrate  [place] fish oil  Continue insulin  Dietician consult  Dietary and lifestyle modifications discussed.          Electrolyte abnormality  Phosphorus 1.5  IV phosphorus replacement       Metabolic acidosis due to diabetes mellitus  Anion gap elevated at admission and slowly improving   Will monitor and once closed will transition from insulin gtt to sc insulin.      8/31 Anion gap labile.  Unsure etiology.  Metabolic acidosis work up unrevealing so far.   Will trial determir to see response.  Once anion gap stabilizes she will need close endocrinology follow up.      9/1 Bicarb low but possibly due to hypertriglyceridemia.      POLY (acute kidney injury)  Patient with acute kidney injury/acute renal failure likely due to pre-renal azotemia due to dehydration POLY is currently  improving. Baseline creatinine 0.8 - Labs reviewed- Renal function/electrolytes with Estimated Creatinine Clearance: 106.6 mL/min (based on SCr of 0.7 mg/dL). according to latest data. Monitor urine output and serial BMP and adjust therapy as needed. Avoid nephrotoxins and renally dose meds for GFR listed above.    Resolved     Former smoker  In remission         VTE Risk Mitigation (From admission, onward)         Ordered     enoxaparin injection 40 mg  Every 24 hours         08/28/23 2040     IP VTE HIGH RISK PATIENT  Once         08/28/23 2105     Place sequential compression device  Until discontinued         08/28/23 2105     Place sequential compression device  Until discontinued         08/28/23 1902     Place sequential compression device  Until discontinued         08/28/23 1853     Place sequential compression device  Until discontinued         08/28/23 1853                Discharge Planning   DORON:      Code Status: Full Code   Is the patient medically ready for discharge?:     Reason for patient still in hospital (select all that apply): Patient trending condition  Discharge Plan A: Home with family, Home                  Misty Pack PA-C  Department of Hospital Medicine   [place] [facility] (3rd Fl)

## 2023-09-01 NOTE — SUBJECTIVE & OBJECTIVE
"Past Medical History:   Diagnosis Date    Diabetes mellitus     Thyroid disease     hypothyroidism    Thyroid disease during pregnancy, second trimester 10/18/2019       Past Surgical History:   Procedure Laterality Date     SECTION N/A 2020    Procedure: REPEAT  SECTION;  Surgeon: Onelia Crespo MD;  [facility];  Service: OB/GYN;  Laterality: N/A;    CHOLECYSTECTOMY      DILATION AND CURETTAGE OF UTERUS      FOOT SURGERY Right     has a nail in the foot    SALPINGECTOMY Left 2020    Procedure: SALPINGECTOMY;  Surgeon: Onelia Crespo MD;  [facility];  Service: OB/GYN;  Laterality: Left;    SALPINGOOPHORECTOMY Right 2019    Procedure: RIGHT SALPINGO-OOPHORECTOMY USO;  Surgeon: Mary Iglesias MD;  Location: [facility];  Service: OB/GYN;  Laterality: Right;    TONSILLECTOMY      TUBAL LIGATION         Review of patient's allergies indicates:  No Known Allergies    No current facility-administered medications on file prior to encounter.     Current Outpatient Medications on File Prior to Encounter   Medication Sig    cyclobenzaprine (FLEXERIL) 10 MG tablet Take 10 mg by mouth every 8 (eight) hours.    diclofenac (CATAFLAM) 50 MG tablet Take 50 mg by mouth every 8 (eight) hours as needed (pain). x7 days    predniSONE (DELTASONE) 50 MG Tab Take 50 mg by mouth once daily.    aspirin (ECOTRIN) 81 MG EC tablet Take 81 mg by mouth once daily.    BD INSULIN SYRINGE ULTRA-FINE 0.5 mL 31 gauge x 5/16" Syrg Inject 1 Syringe into the skin once daily.    blood sugar diagnostic (RELION PRIME TEST STRIPS) Strp 1 strip by Misc.(Non-Drug; Combo Route) route 4 (four) times daily.    insulin syringe-needle U-100 (BD INSULIN SYRINGE ULTRA-FINE) 0.3 mL 31 gauge x 5/16" Syrg Use in the evening as directed.    metFORMIN (GLUCOPHAGE) 1000 MG tablet Take 1 tablet (1,000 mg total) by mouth 2 (two) times daily with meals. (Patient not taking: Reported on 2023)    pen needle, " "diabetic 29 gauge x 1/2" Ndle Use four times daily with insulin pens    TRUE METRIX GLUCOSE TEST STRIP Strp     TRUEPLUS LANCETS 28 gauge Misc     [DISCONTINUED] HUMALOG U-100 INSULIN 100 unit/mL injection Inject 5 Units into the skin 2 (two) times daily.      Family History       Problem Relation (Age of Onset)    Breast cancer Maternal Grandmother    COPD Mother    Diabetes Father          Tobacco Use    Smoking status: Former     Current packs/day: 0.50     Average packs/day: 0.5 packs/day for 23.0 years (11.5 ttl pk-yrs)     Types: Cigarettes    Smokeless tobacco: Never   Substance and Sexual Activity    Alcohol use: Not Currently    Drug use: Never     Types: Methamphetamines, Marijuana     Comment: last use mauijuana & meth 2019    Sexual activity: Yes     Partners: Male     Birth control/protection: See Surgical Hx     Comment:      Review of Systems   Constitutional:  Positive for fatigue (improving). Negative for chills and fever.   HENT:  Negative for ear discharge and ear pain.    Eyes:  Negative for pain and discharge.   Respiratory:  Negative for cough and shortness of breath.    Cardiovascular:  Negative for chest pain and leg swelling.   Gastrointestinal:  Negative for nausea (resolved) and vomiting (resolved).   Endocrine: Positive for polydipsia and polyuria. Negative for cold intolerance and heat intolerance.   Genitourinary:  Negative for difficulty urinating and dysuria.   Musculoskeletal:  Positive for neck pain and neck stiffness. Negative for joint swelling and myalgias.   Skin:  Negative for rash and wound.   Neurological:  Negative for dizziness and headaches.   Psychiatric/Behavioral:  Negative for agitation and confusion.      Objective:     Vital Signs (Most Recent):  Temp: 97.9 °F (36.6 °C) (09/01/23 1144)  Pulse: 81 (09/01/23 1400)  Resp: 18 (09/01/23 1144)  BP: (!) 142/91 (09/01/23 1238)  SpO2: 100 % (09/01/23 1144) Vital Signs (24h Range):  Temp:  [96 °F (35.6 °C)-98.4 °F " "(36.9 °C)] 97.9 °F (36.6 °C)  Pulse:  [61-89] 81  Resp:  [18-26] 18  SpO2:  [97 %-100 %] 100 %  BP: (116-153)/(63-95) 142/91     Weight: 76 kg (167 lb 8.8 oz)  Body mass index is 28.76 kg/m².     Physical Exam  Constitutional:       General: She is in acute distress (neck pain).   HENT:      Head: Normocephalic and atraumatic.   Eyes:      General:         Right eye: No discharge.         Left eye: No discharge.   Cardiovascular:      Rate and Rhythm: Normal rate and regular rhythm.   Pulmonary:      Effort: Pulmonary effort is normal.      Breath sounds: Normal breath sounds.   Abdominal:      General: There is no distension.      Tenderness: There is no abdominal tenderness.   Musculoskeletal:         General: No swelling or tenderness. Normal range of motion.      Cervical back: Neck supple. No tenderness.   Skin:     General: Skin is warm and dry.   Neurological:      General: No focal deficit present.      Mental Status: She is alert and oriented to person, place, and time.   Psychiatric:         Mood and Affect: Mood normal.         Behavior: Behavior normal.                Significant Labs: A1C:   Recent Labs   Lab 08/28/23  1732   HGBA1C 8.5*       ABGs:   Recent Labs   Lab 09/01/23  1220   PH 7.420   PCO2 38   HCO3 24.60   POCSATURATED 93.1   BE 0.30   PO2 66*       Bilirubin:   Recent Labs   Lab 08/28/23  1546 08/28/23  1922 08/29/23  1258   BILITOT 0.3 0.2 0.4       Blood Culture: No results for input(s): "LABBLOO" in the last 48 hours.  BMP:   Recent Labs   Lab 08/31/23  1813 09/01/23  0011 09/01/23  1227   *   < > 259*   *   < > 123*   K 4.2   < > 4.0   CL 95   < > 93*   CO2 10*   < > 9*   BUN 11   < > 8   CREATININE 0.8   < > 0.7   CALCIUM 8.9   < > 9.3   MG 1.8  --   --     < > = values in this interval not displayed.       CBC:   Recent Labs   Lab 08/30/23  1427   WBC 9.73   HGB 15.2   HCT 40.6          CMP:   Recent Labs   Lab 09/01/23  0011 09/01/23  0552 09/01/23  1227   NA " "128* 126* 123*   K 3.9 3.8 4.0   CL 98 98 93*   CO2 5* 6* 9*   * 208* 259*   BUN 10 7 8   CREATININE 0.7 0.7 0.7   CALCIUM 8.7 8.6* 9.3   ANIONGAP 25* 22* 21*       Cardiac Markers: No results for input(s): "CKMB", "MYOGLOBIN", "BNP", "TROPISTAT" in the last 48 hours.  Coagulation: No results for input(s): "PT", "INR", "APTT" in the last 48 hours.  Lactic Acid:   Recent Labs   Lab 08/31/23  0627 09/01/23  1227   LACTATE 1.6 2.3*       Lipase:   Recent Labs   Lab 09/01/23  0552 09/01/23  1227   LIPASE 96* 58     Lipid Panel:   Recent Labs   Lab 08/31/23  1210   CHOL 596*   HDL 23*   LDLCALC Invalid, Trig>400.0   TRIG >3,600*   CHOLHDL 3.9*     Magnesium:   Recent Labs   Lab 08/31/23  1813   MG 1.8       POCT Glucose:   Recent Labs   Lab 08/31/23  2018 09/01/23  0732 09/01/23  1141   POCTGLUCOSE 224* 210* 230*       Prealbumin: No results for input(s): "PREALBUMIN" in the last 48 hours.  Respiratory Culture: No results for input(s): "GSRESP", "RESPIRATORYC" in the last 48 hours.  Troponin:   Recent Labs   Lab 09/01/23  0552   TROPONINI <0.006     TSH: No results for input(s): "TSH" in the last 4320 hours.  Urine Culture: No results for input(s): "LABURIN" in the last 48 hours.  Urine Studies:   No results for input(s): "COLORU", "APPEARANCEUA", "PHUR", "SPECGRAV", "PROTEINUA", "GLUCUA", "KETONESU", "BILIRUBINUA", "OCCULTUA", "NITRITE", "UROBILINOGEN", "LEUKOCYTESUR", "RBCUA", "WBCUA", "BACTERIA", "SQUAMEPITHEL", "HYALINECASTS" in the last 48 hours.    Invalid input(s): "WRIGHTSUR"      Significant Imaging: I have reviewed all pertinent imaging results/findings within the past 24 hours.  "

## 2023-09-01 NOTE — NURSING
Dr. Morales was notified of patient's pain on upper back. Norco 5 was ordered. Approximately 2 hours later patient complained of pain not subsidizing and Dr. Morales was called at 0135 AM. Morphine 4mg was given once.

## 2023-09-01 NOTE — ASSESSMENT & PLAN NOTE
>3k  Fenofibrate  Omega fish oil  Continue insulin  Dietician consult  Dietary and lifestyle modifications discussed.

## 2023-09-02 LAB
ALBUMIN SERPL BCP-MCNC: 3.2 G/DL (ref 3.5–5.2)
ALP SERPL-CCNC: 89 U/L (ref 55–135)
ALT SERPL W/O P-5'-P-CCNC: 75 U/L (ref 10–44)
ANION GAP SERPL CALC-SCNC: 13 MMOL/L (ref 8–16)
ANION GAP SERPL CALC-SCNC: 15 MMOL/L (ref 8–16)
ANION GAP SERPL CALC-SCNC: 17 MMOL/L (ref 8–16)
ANION GAP SERPL CALC-SCNC: 19 MMOL/L (ref 8–16)
AST SERPL-CCNC: 44 U/L (ref 10–40)
BASOPHILS # BLD AUTO: 0.05 K/UL (ref 0–0.2)
BASOPHILS NFR BLD: 0.6 % (ref 0–1.9)
BILIRUB SERPL-MCNC: 0.3 MG/DL (ref 0.1–1)
BUN SERPL-MCNC: 10 MG/DL (ref 6–20)
BUN SERPL-MCNC: 7 MG/DL (ref 6–20)
BUN SERPL-MCNC: 7 MG/DL (ref 6–20)
BUN SERPL-MCNC: 9 MG/DL (ref 6–20)
CALCIUM SERPL-MCNC: 8.5 MG/DL (ref 8.7–10.5)
CALCIUM SERPL-MCNC: 8.8 MG/DL (ref 8.7–10.5)
CALCIUM SERPL-MCNC: 8.9 MG/DL (ref 8.7–10.5)
CALCIUM SERPL-MCNC: 9.2 MG/DL (ref 8.7–10.5)
CHLORIDE SERPL-SCNC: 100 MMOL/L (ref 95–110)
CHLORIDE SERPL-SCNC: 101 MMOL/L (ref 95–110)
CHLORIDE SERPL-SCNC: 102 MMOL/L (ref 95–110)
CHLORIDE SERPL-SCNC: 99 MMOL/L (ref 95–110)
CO2 SERPL-SCNC: 12 MMOL/L (ref 23–29)
CO2 SERPL-SCNC: 12 MMOL/L (ref 23–29)
CO2 SERPL-SCNC: 16 MMOL/L (ref 23–29)
CO2 SERPL-SCNC: 19 MMOL/L (ref 23–29)
CREAT SERPL-MCNC: 0.7 MG/DL (ref 0.5–1.4)
DIFFERENTIAL METHOD: NORMAL
EOSINOPHIL # BLD AUTO: 0.2 K/UL (ref 0–0.5)
EOSINOPHIL NFR BLD: 2.4 % (ref 0–8)
ERYTHROCYTE [DISTWIDTH] IN BLOOD BY AUTOMATED COUNT: 12.4 % (ref 11.5–14.5)
EST. GFR  (NO RACE VARIABLE): >60 ML/MIN/1.73 M^2
GLUCOSE SERPL-MCNC: 166 MG/DL (ref 70–110)
GLUCOSE SERPL-MCNC: 167 MG/DL (ref 70–110)
GLUCOSE SERPL-MCNC: 178 MG/DL (ref 70–110)
GLUCOSE SERPL-MCNC: 204 MG/DL (ref 70–110)
HCT VFR BLD AUTO: 42.4 % (ref 37–48.5)
HGB BLD-MCNC: 14.9 G/DL (ref 12–16)
IMM GRANULOCYTES # BLD AUTO: 0.04 K/UL (ref 0–0.04)
IMM GRANULOCYTES NFR BLD AUTO: 0.5 % (ref 0–0.5)
LYMPHOCYTES # BLD AUTO: 3.4 K/UL (ref 1–4.8)
LYMPHOCYTES NFR BLD: 39.2 % (ref 18–48)
MAGNESIUM SERPL-MCNC: 1.8 MG/DL (ref 1.6–2.6)
MCH RBC QN AUTO: 30.2 PG (ref 27–31)
MCHC RBC AUTO-ENTMCNC: 35.1 G/DL (ref 32–36)
MCV RBC AUTO: 86 FL (ref 82–98)
MONOCYTES # BLD AUTO: 0.6 K/UL (ref 0.3–1)
MONOCYTES NFR BLD: 7.1 % (ref 4–15)
NEUTROPHILS # BLD AUTO: 4.4 K/UL (ref 1.8–7.7)
NEUTROPHILS NFR BLD: 50.2 % (ref 38–73)
NRBC BLD-RTO: 0 /100 WBC
PHOSPHATE SERPL-MCNC: 2.9 MG/DL (ref 2.7–4.5)
PLATELET # BLD AUTO: 178 K/UL (ref 150–450)
PMV BLD AUTO: 11.9 FL (ref 9.2–12.9)
POCT GLUCOSE: 115 MG/DL (ref 70–110)
POCT GLUCOSE: 124 MG/DL (ref 70–110)
POCT GLUCOSE: 128 MG/DL (ref 70–110)
POCT GLUCOSE: 140 MG/DL (ref 70–110)
POCT GLUCOSE: 142 MG/DL (ref 70–110)
POCT GLUCOSE: 147 MG/DL (ref 70–110)
POCT GLUCOSE: 150 MG/DL (ref 70–110)
POCT GLUCOSE: 152 MG/DL (ref 70–110)
POCT GLUCOSE: 154 MG/DL (ref 70–110)
POCT GLUCOSE: 155 MG/DL (ref 70–110)
POCT GLUCOSE: 161 MG/DL (ref 70–110)
POCT GLUCOSE: 165 MG/DL (ref 70–110)
POCT GLUCOSE: 169 MG/DL (ref 70–110)
POCT GLUCOSE: 172 MG/DL (ref 70–110)
POCT GLUCOSE: 174 MG/DL (ref 70–110)
POCT GLUCOSE: 180 MG/DL (ref 70–110)
POCT GLUCOSE: 182 MG/DL (ref 70–110)
POCT GLUCOSE: 183 MG/DL (ref 70–110)
POCT GLUCOSE: 185 MG/DL (ref 70–110)
POCT GLUCOSE: 195 MG/DL (ref 70–110)
POCT GLUCOSE: 197 MG/DL (ref 70–110)
POCT GLUCOSE: 201 MG/DL (ref 70–110)
POCT GLUCOSE: 205 MG/DL (ref 70–110)
POCT GLUCOSE: 212 MG/DL (ref 70–110)
POTASSIUM SERPL-SCNC: 3.7 MMOL/L (ref 3.5–5.1)
POTASSIUM SERPL-SCNC: 3.8 MMOL/L (ref 3.5–5.1)
POTASSIUM SERPL-SCNC: 4.1 MMOL/L (ref 3.5–5.1)
POTASSIUM SERPL-SCNC: 4.3 MMOL/L (ref 3.5–5.1)
PROT SERPL-MCNC: 7.1 G/DL (ref 6–8.4)
RBC # BLD AUTO: 4.74 M/UL (ref 4–5.4)
SODIUM SERPL-SCNC: 131 MMOL/L (ref 136–145)
SODIUM SERPL-SCNC: 132 MMOL/L (ref 136–145)
TRIGL SERPL-MCNC: 2058 MG/DL (ref 30–150)
TRIGL SERPL-MCNC: 2275 MG/DL (ref 30–150)
TRIGL SERPL-MCNC: 2286 MG/DL (ref 30–150)
TRIGL SERPL-MCNC: 2386 MG/DL (ref 30–150)
WBC # BLD AUTO: 8.75 K/UL (ref 3.9–12.7)

## 2023-09-02 PROCEDURE — 84478 ASSAY OF TRIGLYCERIDES: CPT | Mod: 91 | Performed by: PHYSICIAN ASSISTANT

## 2023-09-02 PROCEDURE — 84478 ASSAY OF TRIGLYCERIDES: CPT | Performed by: PHYSICIAN ASSISTANT

## 2023-09-02 PROCEDURE — 99233 SBSQ HOSP IP/OBS HIGH 50: CPT | Mod: ,,, | Performed by: FAMILY MEDICINE

## 2023-09-02 PROCEDURE — 63600175 PHARM REV CODE 636 W HCPCS: Performed by: PHYSICIAN ASSISTANT

## 2023-09-02 PROCEDURE — 25000003 PHARM REV CODE 250: Performed by: PHYSICIAN ASSISTANT

## 2023-09-02 PROCEDURE — 36415 COLL VENOUS BLD VENIPUNCTURE: CPT | Performed by: PHYSICIAN ASSISTANT

## 2023-09-02 PROCEDURE — 83735 ASSAY OF MAGNESIUM: CPT | Performed by: PHYSICIAN ASSISTANT

## 2023-09-02 PROCEDURE — 99233 PR SUBSEQUENT HOSPITAL CARE,LEVL III: ICD-10-PCS | Mod: ,,, | Performed by: FAMILY MEDICINE

## 2023-09-02 PROCEDURE — 85025 COMPLETE CBC W/AUTO DIFF WBC: CPT | Performed by: PHYSICIAN ASSISTANT

## 2023-09-02 PROCEDURE — 94761 N-INVAS EAR/PLS OXIMETRY MLT: CPT

## 2023-09-02 PROCEDURE — 20000000 HC ICU ROOM

## 2023-09-02 PROCEDURE — 36415 COLL VENOUS BLD VENIPUNCTURE: CPT | Performed by: FAMILY MEDICINE

## 2023-09-02 PROCEDURE — 25000003 PHARM REV CODE 250: Performed by: FAMILY MEDICINE

## 2023-09-02 PROCEDURE — 80053 COMPREHEN METABOLIC PANEL: CPT | Performed by: PHYSICIAN ASSISTANT

## 2023-09-02 PROCEDURE — 80048 BASIC METABOLIC PNL TOTAL CA: CPT | Mod: 91,XB | Performed by: FAMILY MEDICINE

## 2023-09-02 PROCEDURE — 84100 ASSAY OF PHOSPHORUS: CPT | Performed by: PHYSICIAN ASSISTANT

## 2023-09-02 PROCEDURE — 63600175 PHARM REV CODE 636 W HCPCS: Performed by: INTERNAL MEDICINE

## 2023-09-02 PROCEDURE — 25000003 PHARM REV CODE 250: Performed by: STUDENT IN AN ORGANIZED HEALTH CARE EDUCATION/TRAINING PROGRAM

## 2023-09-02 RX ORDER — SUCRALFATE 1 G/10ML
1 SUSPENSION ORAL EVERY 6 HOURS
Status: DISCONTINUED | OUTPATIENT
Start: 2023-09-02 | End: 2023-09-04 | Stop reason: HOSPADM

## 2023-09-02 RX ORDER — KETOROLAC TROMETHAMINE 30 MG/ML
7.5 INJECTION, SOLUTION INTRAMUSCULAR; INTRAVENOUS EVERY 6 HOURS PRN
Status: DISCONTINUED | OUTPATIENT
Start: 2023-09-02 | End: 2023-09-04 | Stop reason: HOSPADM

## 2023-09-02 RX ORDER — TRAMADOL HYDROCHLORIDE 50 MG/1
100 TABLET ORAL EVERY 6 HOURS PRN
Status: DISCONTINUED | OUTPATIENT
Start: 2023-09-02 | End: 2023-09-04 | Stop reason: HOSPADM

## 2023-09-02 RX ADMIN — DEXTROSE, SODIUM CHLORIDE, AND POTASSIUM CHLORIDE 200 ML/HR: 5; .45; .15 INJECTION INTRAVENOUS at 02:09

## 2023-09-02 RX ADMIN — ONDANSETRON HYDROCHLORIDE 4 MG: 2 INJECTION, SOLUTION INTRAMUSCULAR; INTRAVENOUS at 03:09

## 2023-09-02 RX ADMIN — MUPIROCIN: 20 OINTMENT TOPICAL at 09:09

## 2023-09-02 RX ADMIN — GABAPENTIN 100 MG: 100 CAPSULE ORAL at 09:09

## 2023-09-02 RX ADMIN — ENOXAPARIN SODIUM 40 MG: 40 INJECTION SUBCUTANEOUS at 05:09

## 2023-09-02 RX ADMIN — INSULIN HUMAN 0.1 UNITS/KG/HR: 1 INJECTION, SOLUTION INTRAVENOUS at 03:09

## 2023-09-02 RX ADMIN — FENOFIBRATE 48 MG: 48 TABLET ORAL at 09:09

## 2023-09-02 RX ADMIN — HYDROCODONE BITARTRATE AND ACETAMINOPHEN 1 TABLET: 5; 325 TABLET ORAL at 07:09

## 2023-09-02 RX ADMIN — HYDROCODONE BITARTRATE AND ACETAMINOPHEN 1 TABLET: 5; 325 TABLET ORAL at 05:09

## 2023-09-02 RX ADMIN — SUCRALFATE ORAL 1 G: 1 SUSPENSION ORAL at 05:09

## 2023-09-02 RX ADMIN — OMEGA-3-ACID ETHYL ESTERS 1 G: 1 CAPSULE, LIQUID FILLED ORAL at 09:09

## 2023-09-02 RX ADMIN — ASPIRIN 81 MG: 81 TABLET, COATED ORAL at 09:09

## 2023-09-02 RX ADMIN — TRAMADOL HYDROCHLORIDE 100 MG: 50 TABLET, COATED ORAL at 02:09

## 2023-09-02 RX ADMIN — DEXTROSE, SODIUM CHLORIDE, AND POTASSIUM CHLORIDE: 5; .45; .15 INJECTION INTRAVENOUS at 05:09

## 2023-09-02 RX ADMIN — LIDOCAINE 1 PATCH: 50 PATCH CUTANEOUS at 09:09

## 2023-09-02 NOTE — ASSESSMENT & PLAN NOTE
Phosphorus 1.5  IV phosphorus replacement     9/2  Factitious?  Improving with treatment via insulin and improved triglyceride levels.  Will cont in ICU for insulin drip and monitor.

## 2023-09-02 NOTE — ASSESSMENT & PLAN NOTE
Patient's FSGs are controlled on current medication regimen.  Last A1c reviewed-   Lab Results   Component Value Date    HGBA1C 8.5 (H) 08/28/2023     Most recent fingerstick glucose reviewed-   Recent Labs   Lab 09/02/23  0300 09/02/23  0402 09/02/23  0501 09/02/23  0601   POCTGLUCOSE 183* 205* 201* 185*     Current correctional scale on insulin gtt   Maintain anti-hyperglycemic dose as follows-   Antihyperglycemics (From admission, onward)    Start     Stop Route Frequency Ordered    09/02/23 0345  insulin regular in 0.9 % NaCl 100 unit/100 mL (1 unit/mL) infusion        Question Answer Comment   Insulin Rate Adjustment (DO NOT MODIFY ANSWER) [email or web address] version [email or web address]    Initial dose (DO NOT CHANGE): 0.1 units/kg/hr        -- IV Continuous 09/02/23 0340        Hold Oral hypoglycemics while patient is in the hospital.    Beta hydroxybutyrate negative, trace ketones  Waiting for closure of anion gap and will start SC insulin and diet.  Likely triggered by steroid use.       8/30 Glucose levels improving.  S/s insulin in place.  Will decrease dextrose 75cc hour and d/c this afternoon.      8/31 Glucose levels improving.  Continue IV fluids.  Determir and s/s ordered     9/2  She is insulin naive for the most part so will cont to give d5 along with insulin drip to treat hypertrig. Hyperglycemia after corticosteroid improved.

## 2023-09-02 NOTE — ASSESSMENT & PLAN NOTE
Anion gap elevated at admission and slowly improving   Will monitor and once closed will transition from insulin gtt to sc insulin.      8/31 Anion gap labile.  Unsure etiology.  Metabolic acidosis work up unrevealing so far.   Will trial determir to see response.  Once anion gap stabilizes she will need close endocrinology follow up.      9/1 Bicarb low but possibly due to hypertriglyceridemia.      9/2  Patient was never really acidotic. Yes she has dm and an increased anion gap. Hyperglycemia treated. Cont on insulin drip

## 2023-09-02 NOTE — ASSESSMENT & PLAN NOTE
Seen on cervical xray  Pain is worse than baseline  MRI c spine pending   Gabapentin started     9/2  Will need NS f/u. For now, will avoid corticosteroid if possible.  Ask PT to see patient in house.  Control pain with opiates/nsaids as necessary.

## 2023-09-02 NOTE — SUBJECTIVE & OBJECTIVE
Review of Systems   Constitutional:  Positive for fatigue (improving). Negative for chills and fever.   HENT:  Negative for ear discharge and ear pain.    Eyes:  Negative for pain and discharge.   Respiratory:  Negative for cough and shortness of breath.    Cardiovascular:  Negative for chest pain and leg swelling.   Gastrointestinal:  Negative for nausea (resolved) and vomiting (resolved).   Endocrine: Negative for cold intolerance, heat intolerance, polydipsia and polyuria.   Genitourinary:  Negative for difficulty urinating and dysuria.   Musculoskeletal:  Positive for neck pain and neck stiffness. Negative for joint swelling and myalgias.   Skin:  Negative for rash and wound.   Neurological:  Negative for dizziness and headaches.   Psychiatric/Behavioral:  Negative for agitation and confusion.      Objective:     Vital Signs (Most Recent):  Temp: 98 °F (36.7 °C) (09/02/23 0407)  Pulse: 90 (09/01/23 1950)  Resp: (!) 22 (09/01/23 2229)  BP: 126/69 (09/01/23 1703)  SpO2: 97 % (09/01/23 1950) Vital Signs (24h Range):  Temp:  [97.6 °F (36.4 °C)-98.1 °F (36.7 °C)] 98 °F (36.7 °C)  Pulse:  [72-90] 90  Resp:  [18-35] 22  SpO2:  [97 %-100 %] 97 %  BP: (116-153)/(69-95) 126/69     Weight: 76 kg (167 lb 8.8 oz)  Body mass index is 28.76 kg/m².     Physical Exam  Constitutional:       General: She is not in acute distress (neck pain).  HENT:      Head: Normocephalic and atraumatic.   Eyes:      General:         Right eye: No discharge.         Left eye: No discharge.   Cardiovascular:      Rate and Rhythm: Normal rate and regular rhythm.   Pulmonary:      Effort: Pulmonary effort is normal.      Breath sounds: Normal breath sounds.   Abdominal:      General: There is no distension.      Tenderness: There is no abdominal tenderness.   Musculoskeletal:         General: No swelling or tenderness. Normal range of motion.      Cervical back: Neck supple. No tenderness.   Skin:     General: Skin is warm and dry.  "  Neurological:      General: No focal deficit present.      Mental Status: She is alert and oriented to person, place, and time.   Psychiatric:         Mood and Affect: Mood normal.         Behavior: Behavior normal.                Significant Labs: A1C:   Recent Labs   Lab 08/28/23  1732   HGBA1C 8.5*       ABGs:   Recent Labs   Lab 09/01/23  1220   PH 7.420   PCO2 38   HCO3 24.60   POCSATURATED 93.1   BE 0.30   PO2 66*       Bilirubin:   Recent Labs   Lab 08/28/23  1546 08/28/23  1922 08/29/23  1258   BILITOT 0.3 0.2 0.4       Blood Culture: No results for input(s): "LABBLOO" in the last 48 hours.  BMP:   Recent Labs   Lab 08/31/23  1813 09/01/23  0011 09/01/23 2026   *   < > 247*   *   < > 131*   K 4.2   < > 3.9   CL 95   < > 99   CO2 10*   < > 13*   BUN 11   < > 8   CREATININE 0.8   < > 0.7   CALCIUM 8.9   < > 9.4   MG 1.8  --   --     < > = values in this interval not displayed.       CBC:   No results for input(s): "WBC", "HGB", "HCT", "PLT" in the last 48 hours.    CMP:   Recent Labs   Lab 09/01/23  0552 09/01/23  1227 09/01/23 2026   * 123* 131*   K 3.8 4.0 3.9   CL 98 93* 99   CO2 6* 9* 13*   * 259* 247*   BUN 7 8 8   CREATININE 0.7 0.7 0.7   CALCIUM 8.6* 9.3 9.4   ANIONGAP 22* 21* 19*       Cardiac Markers: No results for input(s): "CKMB", "MYOGLOBIN", "BNP", "TROPISTAT" in the last 48 hours.  Coagulation: No results for input(s): "PT", "INR", "APTT" in the last 48 hours.  Lactic Acid:   Recent Labs   Lab 09/01/23  1227   LACTATE 2.3*       Lipase:   Recent Labs   Lab 09/01/23  0552 09/01/23  1227   LIPASE 96* 58       Lipid Panel:   Recent Labs   Lab 08/31/23  1210 09/01/23  1803 09/02/23  0013   CHOL 596*  --   --    HDL 23*  --   --    LDLCALC Invalid, Trig>400.0  --   --    TRIG >3,600*   < > 2,386*   CHOLHDL 3.9*  --   --     < > = values in this interval not displayed.       Magnesium:   Recent Labs   Lab 08/31/23  1813   MG 1.8       POCT Glucose:   Recent Labs   Lab " "09/02/23  0402 09/02/23  0501 09/02/23  0601   POCTGLUCOSE 205* 201* 185*       Prealbumin: No results for input(s): "PREALBUMIN" in the last 48 hours.  Respiratory Culture: No results for input(s): "GSRESP", "RESPIRATORYC" in the last 48 hours.  Troponin:   Recent Labs   Lab 09/01/23  0552   TROPONINI <0.006       TSH: No results for input(s): "TSH" in the last 4320 hours.  Urine Culture: No results for input(s): "LABURIN" in the last 48 hours.  Urine Studies:   No results for input(s): "COLORU", "APPEARANCEUA", "PHUR", "SPECGRAV", "PROTEINUA", "GLUCUA", "KETONESU", "BILIRUBINUA", "OCCULTUA", "NITRITE", "UROBILINOGEN", "LEUKOCYTESUR", "RBCUA", "WBCUA", "BACTERIA", "SQUAMEPITHEL", "HYALINECASTS" in the last 48 hours.    Invalid input(s): "WRIGHTSUR"      Significant Imaging: I have reviewed all pertinent imaging results/findings within the past 24 hours.    MRI - severe stenosis and foraminal encroachment C6-7  "

## 2023-09-02 NOTE — PLAN OF CARE
The patient remained on insulin gtt at set rate throughout the night with titration of fluids.  No complaints at this time.  Will continue to monitor.

## 2023-09-02 NOTE — PROGRESS NOTES
[facility] Medicine  Progress Note    Patient Name: Kiana Hardy  MRN: [id number]  Patient Class: IP- Inpatient   Admission Date: 8/28/2023  Length of Stay: 5 days  Attending Physician: Linda Peck MD  Primary Care Provider: Grady Billings MD        Subjective:     Principal Problem:Type 2 diabetes mellitus with hyperosmolar hyperglycemic state (HHS)        HPI:  Patient is a 38 year old female with medical history of DM type 2 on metformin and former tobacco use disorder who presented to the ED with polyuria.  Patient states on Thursday she noticed xerostomia.  Then on Saturday she went to urgent care for neck and should pain and was started on po steroids. Yesterday she felt fatigued, noticed polyuria and hyperglycemia.  She says she has not been follow up with her PCP or taking good care of herself.  She only sees a chiropractor for the past 5 months for neck and shoulder pain.  She denies fever, chills, CP, SOB, but had nausea and vomiting that is now resolved.        Admitted for HHS.               Overview/Hospital Course:  Patient taken off insulin gtt yesterday once anion gap closed but continues to remain elevated.  Glucose in better range.  Started on dextrose 1/2 normal saline with s/s insulin but expected patient to be able to go back home on metformin since A1C in the mid 8's.  Starting metabolic acidosis work up.      PT Hd stable on room air. Anion gap fluctuating.  Will continue IV fluids.  Continue sliding scale insulin.  Will continue to monitor.  Metabolic acidosis work up unremarkable so far.  Will trial long acting insulin.  Likely will need insulin at discharge and referral to endocrinology.      PT HD stable on room air.  She is having excruciating neck pain.  Attempting to get MRI since pain is worse.  Xray of right foot first due to h/o nail in foot.  Gabapentin started for pain.  Started on fenofibrate, omega 3 and dietician consulted for  hypertriglyceridemia.  Likely etiology of abnormal bicarbs.  Lipase trending down.      9/2  Moved to ICU yesterday afternoon to start on insulin drip to treat hypertriglyeridemia. Improved trends in all labs including triglycerides. MRI yesterday revealed severe cervical changes which explain patient's significant symptomatology. Clinically she is stable and labs are trending in right direction. No new or acute changes today.            Review of Systems   Constitutional:  Positive for fatigue (improving). Negative for chills and fever.   HENT:  Negative for ear discharge and ear pain.    Eyes:  Negative for pain and discharge.   Respiratory:  Negative for cough and shortness of breath.    Cardiovascular:  Negative for chest pain and leg swelling.   Gastrointestinal:  Negative for nausea (resolved) and vomiting (resolved).   Endocrine: Negative for cold intolerance, heat intolerance, polydipsia and polyuria.   Genitourinary:  Negative for difficulty urinating and dysuria.   Musculoskeletal:  Positive for neck pain and neck stiffness. Negative for joint swelling and myalgias.   Skin:  Negative for rash and wound.   Neurological:  Negative for dizziness and headaches.   Psychiatric/Behavioral:  Negative for agitation and confusion.      Objective:     Vital Signs (Most Recent):  Temp: 98 °F (36.7 °C) (09/02/23 0407)  Pulse: 90 (09/01/23 1950)  Resp: (!) 22 (09/01/23 2229)  BP: 126/69 (09/01/23 1703)  SpO2: 97 % (09/01/23 1950) Vital Signs (24h Range):  Temp:  [97.6 °F (36.4 °C)-98.1 °F (36.7 °C)] 98 °F (36.7 °C)  Pulse:  [72-90] 90  Resp:  [18-35] 22  SpO2:  [97 %-100 %] 97 %  BP: (116-153)/(69-95) 126/69     Weight: 76 kg (167 lb 8.8 oz)  Body mass index is 28.76 kg/m².     Physical Exam  Constitutional:       General: She is not in acute distress (neck pain).  HENT:      Head: Normocephalic and atraumatic.   Eyes:      General:         Right eye: No discharge.         Left eye: No discharge.   Cardiovascular:       "Rate and Rhythm: Normal rate and regular rhythm.   Pulmonary:      Effort: Pulmonary effort is normal.      Breath sounds: Normal breath sounds.   Abdominal:      General: There is no distension.      Tenderness: There is no abdominal tenderness.   Musculoskeletal:         General: No swelling or tenderness. Normal range of motion.      Cervical back: Neck supple. No tenderness.   Skin:     General: Skin is warm and dry.   Neurological:      General: No focal deficit present.      Mental Status: She is alert and oriented to person, place, and time.   Psychiatric:         Mood and Affect: Mood normal.         Behavior: Behavior normal.                Significant Labs: A1C:   Recent Labs   Lab 08/28/23  1732   HGBA1C 8.5*       ABGs:   Recent Labs   Lab 09/01/23  1220   PH 7.420   PCO2 38   HCO3 24.60   POCSATURATED 93.1   BE 0.30   PO2 66*       Bilirubin:   Recent Labs   Lab 08/28/23  1546 08/28/23  1922 08/29/23  1258   BILITOT 0.3 0.2 0.4       Blood Culture: No results for input(s): "LABBLOO" in the last 48 hours.  BMP:   Recent Labs   Lab 08/31/23  1813 09/01/23  0011 09/01/23 2026   *   < > 247*   *   < > 131*   K 4.2   < > 3.9   CL 95   < > 99   CO2 10*   < > 13*   BUN 11   < > 8   CREATININE 0.8   < > 0.7   CALCIUM 8.9   < > 9.4   MG 1.8  --   --     < > = values in this interval not displayed.       CBC:   No results for input(s): "WBC", "HGB", "HCT", "PLT" in the last 48 hours.    CMP:   Recent Labs   Lab 09/01/23  0552 09/01/23  1227 09/01/23 2026   * 123* 131*   K 3.8 4.0 3.9   CL 98 93* 99   CO2 6* 9* 13*   * 259* 247*   BUN 7 8 8   CREATININE 0.7 0.7 0.7   CALCIUM 8.6* 9.3 9.4   ANIONGAP 22* 21* 19*       Cardiac Markers: No results for input(s): "CKMB", "MYOGLOBIN", "BNP", "TROPISTAT" in the last 48 hours.  Coagulation: No results for input(s): "PT", "INR", "APTT" in the last 48 hours.  Lactic Acid:   Recent Labs   Lab 09/01/23  1227   LACTATE 2.3*       Lipase:   Recent " "Labs   Lab 09/01/23  0552 09/01/23  1227   LIPASE 96* 58       Lipid Panel:   Recent Labs   Lab 08/31/23  1210 09/01/23  1803 09/02/23  0013   CHOL 596*  --   --    HDL 23*  --   --    LDLCALC Invalid, Trig>400.0  --   --    TRIG >3,600*   < > 2,386*   CHOLHDL 3.9*  --   --     < > = values in this interval not displayed.       Magnesium:   Recent Labs   Lab 08/31/23  1813   MG 1.8       POCT Glucose:   Recent Labs   Lab 09/02/23  0402 09/02/23  0501 09/02/23  0601   POCTGLUCOSE 205* 201* 185*       Prealbumin: No results for input(s): "PREALBUMIN" in the last 48 hours.  Respiratory Culture: No results for input(s): "GSRESP", "RESPIRATORYC" in the last 48 hours.  Troponin:   Recent Labs   Lab 09/01/23  0552   TROPONINI <0.006       TSH: No results for input(s): "TSH" in the last 4320 hours.  Urine Culture: No results for input(s): "LABURIN" in the last 48 hours.  Urine Studies:   No results for input(s): "COLORU", "APPEARANCEUA", "PHUR", "SPECGRAV", "PROTEINUA", "GLUCUA", "KETONESU", "BILIRUBINUA", "OCCULTUA", "NITRITE", "UROBILINOGEN", "LEUKOCYTESUR", "RBCUA", "WBCUA", "BACTERIA", "SQUAMEPITHEL", "HYALINECASTS" in the last 48 hours.    Invalid input(s): "WRIGHTSUR"      Significant Imaging: I have reviewed all pertinent imaging results/findings within the past 24 hours.    MRI - severe stenosis and foraminal encroachment C6-7      Assessment/Plan:      * Type 2 diabetes mellitus with hyperosmolar hyperglycemic state (HHS)  Patient's FSGs are controlled on current medication regimen.  Last A1c reviewed-   Lab Results   Component Value Date    HGBA1C 8.5 (H) 08/28/2023     Most recent fingerstick glucose reviewed-   Recent Labs   Lab 09/02/23  0300 09/02/23  0402 09/02/23  0501 09/02/23  0601   POCTGLUCOSE 183* 205* 201* 185*     Current correctional scale on insulin gtt   Maintain anti-hyperglycemic dose as follows-   Antihyperglycemics (From admission, onward)    Start     Stop Route Frequency Ordered    09/02/23 " 0345  insulin regular in 0.9 % NaCl 100 unit/100 mL (1 unit/mL) infusion        Question Answer Comment   Insulin Rate Adjustment (DO NOT MODIFY ANSWER) \\[email or web address]\[email or web address]\Images\Pharmacy\InsulinInfusions\INSULIN ADJUSTMENT [facility] version [id number].pdf    Initial dose (DO NOT CHANGE): 0.1 units/kg/hr        -- IV Continuous 09/02/23 0340        Hold Oral hypoglycemics while patient is in the hospital.    Beta hydroxybutyrate negative, trace ketones  Waiting for closure of anion gap and will start SC insulin and diet.  Likely triggered by steroid use.       8/30 Glucose levels improving.  S/s insulin in place.  Will decrease dextrose 75cc hour and d/c this afternoon.      8/31 Glucose levels improving.  Continue IV fluids.  Determir and s/s ordered     9/2  She is insulin naive for the most part so will cont to give d5 along with insulin drip to treat hypertrig. Hyperglycemia after corticosteroid improved.    Cervical radiculopathy  Seen on cervical xray  Pain is worse than baseline  MRI c spine pending   Gabapentin started     9/2  Will need NS f/u. For now, will avoid corticosteroid if possible.  Ask PT to see patient in house.  Control pain with opiates/nsaids as necessary.      Hypertriglyceridemia  >3k  Fenofibrate  [place] fish oil  Continue insulin  Dietician consult  Dietary and lifestyle modifications discussed.      9/2  Cont on insulin drip until <500. Will start on fenofibrate then.  Cont to monitor other labs and check trig q12.        Electrolyte abnormality  Phosphorus 1.5  IV phosphorus replacement     9/2  Factitious?  Improving with treatment via insulin and improved triglyceride levels.  Will cont in ICU for insulin drip and monitor.    Metabolic acidosis due to diabetes mellitus  Anion gap elevated at admission and slowly improving   Will monitor and once closed will transition from insulin gtt to sc insulin.      8/31 Anion gap labile.  Unsure etiology.  Metabolic acidosis work up unrevealing so far.    Will trial determir to see response.  Once anion gap stabilizes she will need close endocrinology follow up.      9/1 Bicarb low but possibly due to hypertriglyceridemia.      9/2  Patient was never really acidotic. Yes she has dm and an increased anion gap. Hyperglycemia treated. Cont on insulin drip    POLY (acute kidney injury)  Patient with acute kidney injury/acute renal failure likely due to pre-renal azotemia due to dehydration POLY is currently improving. Baseline creatinine 0.8 - Labs reviewed- Renal function/electrolytes with Estimated Creatinine Clearance: 106.6 mL/min (based on SCr of 0.7 mg/dL). according to latest data. Monitor urine output and serial BMP and adjust therapy as needed. Avoid nephrotoxins and renally dose meds for GFR listed above.    Resolved     Former smoker  In remission         VTE Risk Mitigation (From admission, onward)         Ordered     enoxaparin injection 40 mg  Every 24 hours         08/28/23 2040     IP VTE HIGH RISK PATIENT  Once         08/28/23 2105     Place sequential compression device  Until discontinued         08/28/23 2105     Place sequential compression device  Until discontinued         08/28/23 1902     Place sequential compression device  Until discontinued         08/28/23 1853     Place sequential compression device  Until discontinued         08/28/23 1853                Discharge Planning   DORON:      Code Status: Full Code   Is the patient medically ready for discharge?:     Reason for patient still in hospital (select all that apply): Treatment  Discharge Plan A: Home with family, Home            Critical care time spent on the evaluation and treatment of severe organ dysfunction, review of pertinent labs and imaging studies, discussions with consulting providers and discussions with patient/family: 30 minutes.      Linda Peck MD  Department of Hospital Medicine   [place] - Intensive Care

## 2023-09-02 NOTE — EICU
Intervention Initiated From:  COR / DESTIN Paez intervened regarding:  Rounding (Video assessment)      Comments: AA&Ox3.  No complaints.  Insulin gtt infusing.  Video rounding complete.

## 2023-09-02 NOTE — ASSESSMENT & PLAN NOTE
>3k  Fenofibrate  [place] fish oil  Continue insulin  Dietician consult  Dietary and lifestyle modifications discussed.      9/2  Cont on insulin drip until <500. Will start on fenofibrate then.  Cont to monitor other labs and check trig q12.

## 2023-09-02 NOTE — CONSULTS
RD providing remote coverage. Consulted for diet education r/t diabetes and severe hypertriglyceridemia. Attempted education prior to transfer to ICU. Patient c/o active pain and exhibiting physical signs of distress -- diet education not appropriate at time of RD visit. Will follow up in person for diet education as warranted on Tuesday, 9/5/2023.     Clinical reference materials/handouts added to discharge documents in the case of patient discharge before RD follow up.      Education materials to be provided with discharge instructions at follow up.        Please re-consult as needed.    Thank you.         Bing Hackett, RDN, LDN

## 2023-09-02 NOTE — EICU
"Intervention Initiated From:  COR / DESTIN Paez intervened regarding:  Rounding (Video assessment)    Comments: Video assessment completed.  Pt lying in bed.  Noted to be AAO.  States she feels better but "achy from laying in the bed for so long".  Insulin infusion in progress at 0.5 u/kg/hr.  NAD noted at this time.  "

## 2023-09-03 PROBLEM — E87.29 METABOLIC ACIDOSIS, INCREASED ANION GAP: Status: ACTIVE | Noted: 2023-08-29

## 2023-09-03 LAB
ANION GAP SERPL CALC-SCNC: 10 MMOL/L (ref 8–16)
ANION GAP SERPL CALC-SCNC: 13 MMOL/L (ref 8–16)
BUN SERPL-MCNC: 11 MG/DL (ref 6–20)
BUN SERPL-MCNC: 6 MG/DL (ref 6–20)
CALCIUM SERPL-MCNC: 9 MG/DL (ref 8.7–10.5)
CALCIUM SERPL-MCNC: 9.1 MG/DL (ref 8.7–10.5)
CHLORIDE SERPL-SCNC: 101 MMOL/L (ref 95–110)
CHLORIDE SERPL-SCNC: 102 MMOL/L (ref 95–110)
CO2 SERPL-SCNC: 19 MMOL/L (ref 23–29)
CO2 SERPL-SCNC: 21 MMOL/L (ref 23–29)
CREAT SERPL-MCNC: 0.7 MG/DL (ref 0.5–1.4)
CREAT SERPL-MCNC: 0.8 MG/DL (ref 0.5–1.4)
EST. GFR  (NO RACE VARIABLE): >60 ML/MIN/1.73 M^2
EST. GFR  (NO RACE VARIABLE): >60 ML/MIN/1.73 M^2
GLUCOSE SERPL-MCNC: 145 MG/DL (ref 70–110)
GLUCOSE SERPL-MCNC: 220 MG/DL (ref 70–110)
METHANOL SERPL-MCNC: <5 MG/DL
POCT GLUCOSE: 111 MG/DL (ref 70–110)
POCT GLUCOSE: 113 MG/DL (ref 70–110)
POCT GLUCOSE: 135 MG/DL (ref 70–110)
POCT GLUCOSE: 136 MG/DL (ref 70–110)
POCT GLUCOSE: 136 MG/DL (ref 70–110)
POCT GLUCOSE: 138 MG/DL (ref 70–110)
POCT GLUCOSE: 139 MG/DL (ref 70–110)
POCT GLUCOSE: 140 MG/DL (ref 70–110)
POCT GLUCOSE: 140 MG/DL (ref 70–110)
POCT GLUCOSE: 141 MG/DL (ref 70–110)
POCT GLUCOSE: 145 MG/DL (ref 70–110)
POCT GLUCOSE: 147 MG/DL (ref 70–110)
POCT GLUCOSE: 148 MG/DL (ref 70–110)
POCT GLUCOSE: 151 MG/DL (ref 70–110)
POCT GLUCOSE: 163 MG/DL (ref 70–110)
POCT GLUCOSE: 168 MG/DL (ref 70–110)
POCT GLUCOSE: 176 MG/DL (ref 70–110)
POCT GLUCOSE: 180 MG/DL (ref 70–110)
POCT GLUCOSE: 203 MG/DL (ref 70–110)
POCT GLUCOSE: 206 MG/DL (ref 70–110)
POCT GLUCOSE: 215 MG/DL (ref 70–110)
POCT GLUCOSE: 237 MG/DL (ref 70–110)
POTASSIUM SERPL-SCNC: 3.9 MMOL/L (ref 3.5–5.1)
POTASSIUM SERPL-SCNC: 4 MMOL/L (ref 3.5–5.1)
SODIUM SERPL-SCNC: 132 MMOL/L (ref 136–145)
SODIUM SERPL-SCNC: 134 MMOL/L (ref 136–145)
TRIGL SERPL-MCNC: 1405 MG/DL (ref 30–150)
TRIGL SERPL-MCNC: 1443 MG/DL (ref 30–150)
TRIGL SERPL-MCNC: 1662 MG/DL (ref 30–150)
TRIGL SERPL-MCNC: 1794 MG/DL (ref 30–150)

## 2023-09-03 PROCEDURE — 25000003 PHARM REV CODE 250: Performed by: STUDENT IN AN ORGANIZED HEALTH CARE EDUCATION/TRAINING PROGRAM

## 2023-09-03 PROCEDURE — 36415 COLL VENOUS BLD VENIPUNCTURE: CPT | Performed by: FAMILY MEDICINE

## 2023-09-03 PROCEDURE — 25000003 PHARM REV CODE 250: Performed by: PHYSICIAN ASSISTANT

## 2023-09-03 PROCEDURE — 99232 SBSQ HOSP IP/OBS MODERATE 35: CPT | Mod: ,,, | Performed by: FAMILY MEDICINE

## 2023-09-03 PROCEDURE — 36415 COLL VENOUS BLD VENIPUNCTURE: CPT | Performed by: PHYSICIAN ASSISTANT

## 2023-09-03 PROCEDURE — 25000003 PHARM REV CODE 250: Performed by: FAMILY MEDICINE

## 2023-09-03 PROCEDURE — 63600175 PHARM REV CODE 636 W HCPCS: Performed by: INTERNAL MEDICINE

## 2023-09-03 PROCEDURE — 84478 ASSAY OF TRIGLYCERIDES: CPT | Mod: 91 | Performed by: PHYSICIAN ASSISTANT

## 2023-09-03 PROCEDURE — 94761 N-INVAS EAR/PLS OXIMETRY MLT: CPT

## 2023-09-03 PROCEDURE — 63600175 PHARM REV CODE 636 W HCPCS: Performed by: PHYSICIAN ASSISTANT

## 2023-09-03 PROCEDURE — 84478 ASSAY OF TRIGLYCERIDES: CPT | Performed by: PHYSICIAN ASSISTANT

## 2023-09-03 PROCEDURE — 20000000 HC ICU ROOM

## 2023-09-03 PROCEDURE — 99232 PR SUBSEQUENT HOSPITAL CARE,LEVL II: ICD-10-PCS | Mod: ,,, | Performed by: FAMILY MEDICINE

## 2023-09-03 PROCEDURE — 80048 BASIC METABOLIC PNL TOTAL CA: CPT | Performed by: FAMILY MEDICINE

## 2023-09-03 RX ADMIN — SUCRALFATE ORAL 1 G: 1 SUSPENSION ORAL at 05:09

## 2023-09-03 RX ADMIN — INSULIN HUMAN 0.1 UNITS/KG/HR: 1 INJECTION, SOLUTION INTRAVENOUS at 06:09

## 2023-09-03 RX ADMIN — GABAPENTIN 100 MG: 100 CAPSULE ORAL at 08:09

## 2023-09-03 RX ADMIN — DEXTROSE, SODIUM CHLORIDE, AND POTASSIUM CHLORIDE 150 ML/HR: 5; .45; .15 INJECTION INTRAVENOUS at 12:09

## 2023-09-03 RX ADMIN — HYDROCODONE BITARTRATE AND ACETAMINOPHEN 1 TABLET: 5; 325 TABLET ORAL at 08:09

## 2023-09-03 RX ADMIN — TRAMADOL HYDROCHLORIDE 100 MG: 50 TABLET, COATED ORAL at 02:09

## 2023-09-03 RX ADMIN — OMEGA-3-ACID ETHYL ESTERS 1 G: 1 CAPSULE, LIQUID FILLED ORAL at 08:09

## 2023-09-03 RX ADMIN — LIDOCAINE 1 PATCH: 50 PATCH CUTANEOUS at 08:09

## 2023-09-03 RX ADMIN — DEXTROSE, SODIUM CHLORIDE, AND POTASSIUM CHLORIDE: 5; .45; .15 INJECTION INTRAVENOUS at 07:09

## 2023-09-03 RX ADMIN — ENOXAPARIN SODIUM 40 MG: 40 INJECTION SUBCUTANEOUS at 05:09

## 2023-09-03 RX ADMIN — SUCRALFATE ORAL 1 G: 1 SUSPENSION ORAL at 12:09

## 2023-09-03 RX ADMIN — ONDANSETRON HYDROCHLORIDE 4 MG: 2 INJECTION, SOLUTION INTRAMUSCULAR; INTRAVENOUS at 02:09

## 2023-09-03 RX ADMIN — FENOFIBRATE 48 MG: 48 TABLET ORAL at 08:09

## 2023-09-03 RX ADMIN — DEXTROSE, SODIUM CHLORIDE, AND POTASSIUM CHLORIDE: 5; .45; .15 INJECTION INTRAVENOUS at 02:09

## 2023-09-03 RX ADMIN — DEXTROSE, SODIUM CHLORIDE, AND POTASSIUM CHLORIDE 150 ML/HR: 5; .45; .15 INJECTION INTRAVENOUS at 09:09

## 2023-09-03 RX ADMIN — ASPIRIN 81 MG: 81 TABLET, COATED ORAL at 08:09

## 2023-09-03 RX ADMIN — INSULIN HUMAN 0.1 UNITS/KG/HR: 1 INJECTION, SOLUTION INTRAVENOUS at 05:09

## 2023-09-03 NOTE — ASSESSMENT & PLAN NOTE
Patient's FSGs are controlled on current medication regimen.  Last A1c reviewed-   Lab Results   Component Value Date    HGBA1C 8.5 (H) 08/28/2023     Most recent fingerstick glucose reviewed-   Recent Labs   Lab 09/03/23  0459 09/03/23  0602 09/03/23  0714 09/03/23  0807   POCTGLUCOSE 141* 138* 139* 140*     Current correctional scale on insulin gtt   Maintain anti-hyperglycemic dose as follows-   Antihyperglycemics (From admission, onward)    Start     Stop Route Frequency Ordered    09/02/23 0345  insulin regular in 0.9 % NaCl 100 unit/100 mL (1 unit/mL) infusion        Question Answer Comment   Insulin Rate Adjustment (DO NOT MODIFY ANSWER) \\[email or web address]\Images\Pharmacy\InsulinInfusions\INSULIN ADJUSTMENT [facility] version [id number].pdf    Initial dose (DO NOT CHANGE): 0.1 units/kg/hr        -- IV Continuous 09/02/23 0340        Hold Oral hypoglycemics while patient is in the hospital.    Beta hydroxybutyrate negative, trace ketones  Waiting for closure of anion gap and will start SC insulin and diet.  Likely triggered by steroid use.       8/30 Glucose levels improving.  S/s insulin in place.  Will decrease dextrose 75cc hour and d/c this afternoon.      8/31 Glucose levels improving.  Continue IV fluids.  Determir and s/s ordered     9/2  She is insulin naive for the most part so will cont to give d5 along with insulin drip to treat hypertrig. Hyperglycemia after corticosteroid improved.    9/3  Blood sugars are well controlled. NO hypoglycemia. Will titrate d5 if needed today.

## 2023-09-03 NOTE — ASSESSMENT & PLAN NOTE
Seen on cervical xray  Pain is worse than baseline  MRI c spine pending   Gabapentin started     9/2  Will need NS f/u. For now, will avoid corticosteroid if possible.  Ask PT to see patient in house.  Control pain with opiates/nsaids as necessary.    9/3  Doing well with toradol. With h/o abuse asking NOT to take opiates.

## 2023-09-03 NOTE — SUBJECTIVE & OBJECTIVE
Review of Systems   Constitutional:  Negative for chills, fatigue (improving) and fever.   HENT:  Negative for ear discharge and ear pain.    Eyes:  Negative for pain and discharge.   Respiratory:  Negative for cough and shortness of breath.    Cardiovascular:  Negative for chest pain and leg swelling.   Gastrointestinal:  Negative for nausea (resolved) and vomiting (resolved).   Endocrine: Negative for cold intolerance, heat intolerance, polydipsia and polyuria.   Genitourinary:  Negative for difficulty urinating and dysuria.   Musculoskeletal:  Positive for neck pain and neck stiffness. Negative for joint swelling and myalgias.   Skin:  Negative for rash and wound.   Neurological:  Negative for dizziness and headaches.   Psychiatric/Behavioral:  Negative for agitation and confusion.      Objective:     Vital Signs (Most Recent):  Temp: 98.3 °F (36.8 °C) (09/03/23 0703)  Pulse: 82 (09/03/23 0703)  Resp: 18 (09/03/23 0803)  BP: (!) 143/89 (09/03/23 0703)  SpO2: 98 % (09/03/23 0703) Vital Signs (24h Range):  Temp:  [97.7 °F (36.5 °C)-98.4 °F (36.9 °C)] 98.3 °F (36.8 °C)  Pulse:  [] 82  Resp:  [12-48] 18  SpO2:  [95 %-99 %] 98 %  BP: (116-158)/(55-94) 143/89     Weight: 76 kg (167 lb 8.8 oz)  Body mass index is 28.76 kg/m².     Physical Exam  Constitutional:       General: She is not in acute distress (neck pain).  HENT:      Head: Normocephalic and atraumatic.   Eyes:      General:         Right eye: No discharge.         Left eye: No discharge.   Neck:      Comments: Some right sided extension  Cardiovascular:      Rate and Rhythm: Normal rate and regular rhythm.   Pulmonary:      Effort: Pulmonary effort is normal.      Breath sounds: Normal breath sounds.   Abdominal:      General: There is no distension.      Tenderness: There is no abdominal tenderness.   Musculoskeletal:         General: No swelling. Normal range of motion.      Cervical back: Rigidity and tenderness present.   Skin:     General:  "Skin is warm and dry.   Neurological:      General: No focal deficit present.      Mental Status: She is alert and oriented to person, place, and time.   Psychiatric:         Mood and Affect: Mood normal.         Behavior: Behavior normal.                Significant Labs: A1C:   Recent Labs   Lab 08/28/23  1732   HGBA1C 8.5*       ABGs:   Recent Labs   Lab 09/01/23  1220   PH 7.420   PCO2 38   HCO3 24.60   POCSATURATED 93.1   BE 0.30   PO2 66*       Bilirubin:   Recent Labs   Lab 08/28/23  1546 08/28/23  1922 08/29/23  1258 09/02/23  0550   BILITOT 0.3 0.2 0.4 0.3       Blood Culture: No results for input(s): "LABBLOO" in the last 48 hours.  BMP:   Recent Labs   Lab 09/02/23  0550 09/02/23  1218 09/03/23  0600   *   < > 145*   *   < > 134*   K 4.1   < > 4.0      < > 102   CO2 12*   < > 19*   BUN 7   < > 6   CREATININE 0.7   < > 0.7   CALCIUM 8.5*   < > 9.1   MG 1.8  --   --     < > = values in this interval not displayed.       CBC:   Recent Labs   Lab 09/02/23  0550   WBC 8.75   HGB 14.9   HCT 42.4          CMP:   Recent Labs   Lab 09/02/23  0550 09/02/23  1218 09/02/23  1829 09/02/23  1958 09/03/23  0600   *   < > 131* 131* 134*   K 4.1   < > 3.8 3.7 4.0      < > 100 99 102   CO2 12*   < > 16* 19* 19*   *   < > 166* 167* 145*   BUN 7   < > 10 9 6   CREATININE 0.7   < > 0.7 0.7 0.7   CALCIUM 8.5*   < > 8.8 8.9 9.1   PROT 7.1  --   --   --   --    ALBUMIN 3.2*  --   --   --   --    BILITOT 0.3  --   --   --   --    ALKPHOS 89  --   --   --   --    AST 44*  --   --   --   --    ALT 75*  --   --   --   --    ANIONGAP 19*   < > 15 13 13    < > = values in this interval not displayed.       Cardiac Markers: No results for input(s): "CKMB", "MYOGLOBIN", "BNP", "TROPISTAT" in the last 48 hours.  Coagulation: No results for input(s): "PT", "INR", "APTT" in the last 48 hours.  Lactic Acid:   Recent Labs   Lab 09/01/23  1227   LACTATE 2.3*       Lipase:   Recent Labs   Lab " "09/01/23  1227   LIPASE 58       Lipid Panel:   Recent Labs   Lab 09/03/23  0605   TRIG 1,443*       Magnesium:   Recent Labs   Lab 09/02/23  0550   MG 1.8       POCT Glucose:   Recent Labs   Lab 09/03/23  0602 09/03/23  0714 09/03/23  0807   POCTGLUCOSE 138* 139* 140*       Prealbumin: No results for input(s): "PREALBUMIN" in the last 48 hours.  Respiratory Culture: No results for input(s): "GSRESP", "RESPIRATORYC" in the last 48 hours.  Troponin: No results for input(s): "TROPONINI", "TROPONINIHS" in the last 48 hours.    TSH: No results for input(s): "TSH" in the last 4320 hours.  Urine Culture: No results for input(s): "LABURIN" in the last 48 hours.  Urine Studies:   No results for input(s): "COLORU", "APPEARANCEUA", "PHUR", "SPECGRAV", "PROTEINUA", "GLUCUA", "KETONESU", "BILIRUBINUA", "OCCULTUA", "NITRITE", "UROBILINOGEN", "LEUKOCYTESUR", "RBCUA", "WBCUA", "BACTERIA", "SQUAMEPITHEL", "HYALINECASTS" in the last 48 hours.    Invalid input(s): "WRIGHTSUR"      Significant Imaging: I have reviewed all pertinent imaging results/findings within the past 24 hours.    MRI - severe stenosis and foraminal encroachment C6-7  "

## 2023-09-03 NOTE — PLAN OF CARE
The patient remains on insulin gtt at set rate.  Fluids were titrated minimally for goal blood sugars.  No complaints at this time, will continue to monitor.

## 2023-09-03 NOTE — PROGRESS NOTES
[facility] Medicine  Progress Note    Patient Name: Kiana Hardy  MRN: [id number]  Patient Class: IP- Inpatient   Admission Date: 8/28/2023  Length of Stay: 6 days  Attending Physician: Linda Peck MD  Primary Care Provider: Grady Billings MD        Subjective:     Principal Problem:Type 2 diabetes mellitus with hyperosmolar hyperglycemic state (HHS)        HPI:  Patient is a 38 year old female with medical history of DM type 2 on metformin and former tobacco use disorder who presented to the ED with polyuria.  Patient states on Thursday she noticed xerostomia.  Then on Saturday she went to urgent care for neck and should pain and was started on po steroids. Yesterday she felt fatigued, noticed polyuria and hyperglycemia.  She says she has not been follow up with her PCP or taking good care of herself.  She only sees a chiropractor for the past 5 months for neck and shoulder pain.  She denies fever, chills, CP, SOB, but had nausea and vomiting that is now resolved.        Admitted for HHS.               Overview/Hospital Course:  Patient taken off insulin gtt yesterday once anion gap closed but continues to remain elevated.  Glucose in better range.  Started on dextrose 1/2 normal saline with s/s insulin but expected patient to be able to go back home on metformin since A1C in the mid 8's.  Starting metabolic acidosis work up.      PT Hd stable on room air. Anion gap fluctuating.  Will continue IV fluids.  Continue sliding scale insulin.  Will continue to monitor.  Metabolic acidosis work up unremarkable so far.  Will trial long acting insulin.  Likely will need insulin at discharge and referral to endocrinology.      PT HD stable on room air.  She is having excruciating neck pain.  Attempting to get MRI since pain is worse.  Xray of right foot first due to h/o nail in foot.  Gabapentin started for pain.  Started on fenofibrate, omega 3 and dietician consulted for  hypertriglyceridemia.  Likely etiology of abnormal bicarbs.  Lipase trending down.      9/2  Moved to ICU yesterday afternoon to start on insulin drip to treat hypertriglyeridemia. Improved trends in all labs including triglycerides. MRI yesterday revealed severe cervical changes which explain patient's significant symptomatology. Clinically she is stable and labs are trending in right direction. No new or acute changes today.    9/3  Marked improvement in triglycerides - Co2 and lab derrangements are following. Neck pain is much improved. Anticipate 24 more hours in icu for insulin drip and hopefully home tomorrow. Pain is well controlled.              Review of Systems   Constitutional:  Negative for chills, fatigue (improving) and fever.   HENT:  Negative for ear discharge and ear pain.    Eyes:  Negative for pain and discharge.   Respiratory:  Negative for cough and shortness of breath.    Cardiovascular:  Negative for chest pain and leg swelling.   Gastrointestinal:  Negative for nausea (resolved) and vomiting (resolved).   Endocrine: Negative for cold intolerance, heat intolerance, polydipsia and polyuria.   Genitourinary:  Negative for difficulty urinating and dysuria.   Musculoskeletal:  Positive for neck pain and neck stiffness. Negative for joint swelling and myalgias.   Skin:  Negative for rash and wound.   Neurological:  Negative for dizziness and headaches.   Psychiatric/Behavioral:  Negative for agitation and confusion.      Objective:     Vital Signs (Most Recent):  Temp: 98.3 °F (36.8 °C) (09/03/23 0703)  Pulse: 82 (09/03/23 0703)  Resp: 18 (09/03/23 0803)  BP: (!) 143/89 (09/03/23 0703)  SpO2: 98 % (09/03/23 0703) Vital Signs (24h Range):  Temp:  [97.7 °F (36.5 °C)-98.4 °F (36.9 °C)] 98.3 °F (36.8 °C)  Pulse:  [] 82  Resp:  [12-48] 18  SpO2:  [95 %-99 %] 98 %  BP: (116-158)/(55-94) 143/89     Weight: 76 kg (167 lb 8.8 oz)  Body mass index is 28.76 kg/m².     Physical Exam  Constitutional:       " General: She is not in acute distress (neck pain).  HENT:      Head: Normocephalic and atraumatic.   Eyes:      General:         Right eye: No discharge.         Left eye: No discharge.   Neck:      Comments: Some right sided extension  Cardiovascular:      Rate and Rhythm: Normal rate and regular rhythm.   Pulmonary:      Effort: Pulmonary effort is normal.      Breath sounds: Normal breath sounds.   Abdominal:      General: There is no distension.      Tenderness: There is no abdominal tenderness.   Musculoskeletal:         General: No swelling. Normal range of motion.      Cervical back: Rigidity and tenderness present.   Skin:     General: Skin is warm and dry.   Neurological:      General: No focal deficit present.      Mental Status: She is alert and oriented to person, place, and time.   Psychiatric:         Mood and Affect: Mood normal.         Behavior: Behavior normal.                Significant Labs: A1C:   Recent Labs   Lab 08/28/23  1732   HGBA1C 8.5*       ABGs:   Recent Labs   Lab 09/01/23  1220   PH 7.420   PCO2 38   HCO3 24.60   POCSATURATED 93.1   BE 0.30   PO2 66*       Bilirubin:   Recent Labs   Lab 08/28/23  1546 08/28/23  1922 08/29/23  1258 09/02/23  0550   BILITOT 0.3 0.2 0.4 0.3       Blood Culture: No results for input(s): "LABBLOO" in the last 48 hours.  BMP:   Recent Labs   Lab 09/02/23  0550 09/02/23  1218 09/03/23  0600   *   < > 145*   *   < > 134*   K 4.1   < > 4.0      < > 102   CO2 12*   < > 19*   BUN 7   < > 6   CREATININE 0.7   < > 0.7   CALCIUM 8.5*   < > 9.1   MG 1.8  --   --     < > = values in this interval not displayed.       CBC:   Recent Labs   Lab 09/02/23  0550   WBC 8.75   HGB 14.9   HCT 42.4          CMP:   Recent Labs   Lab 09/02/23  0550 09/02/23  1218 09/02/23  1829 09/02/23  1958 09/03/23  0600   *   < > 131* 131* 134*   K 4.1   < > 3.8 3.7 4.0      < > 100 99 102   CO2 12*   < > 16* 19* 19*   *   < > 166* 167* 145* " "  BUN 7   < > 10 9 6   CREATININE 0.7   < > 0.7 0.7 0.7   CALCIUM 8.5*   < > 8.8 8.9 9.1   PROT 7.1  --   --   --   --    ALBUMIN 3.2*  --   --   --   --    BILITOT 0.3  --   --   --   --    ALKPHOS 89  --   --   --   --    AST 44*  --   --   --   --    ALT 75*  --   --   --   --    ANIONGAP 19*   < > 15 13 13    < > = values in this interval not displayed.       Cardiac Markers: No results for input(s): "CKMB", "MYOGLOBIN", "BNP", "TROPISTAT" in the last 48 hours.  Coagulation: No results for input(s): "PT", "INR", "APTT" in the last 48 hours.  Lactic Acid:   Recent Labs   Lab 09/01/23  1227   LACTATE 2.3*       Lipase:   Recent Labs   Lab 09/01/23  1227   LIPASE 58       Lipid Panel:   Recent Labs   Lab 09/03/23  0605   TRIG 1,443*       Magnesium:   Recent Labs   Lab 09/02/23  0550   MG 1.8       POCT Glucose:   Recent Labs   Lab 09/03/23  0602 09/03/23  0714 09/03/23  0807   POCTGLUCOSE 138* 139* 140*       Prealbumin: No results for input(s): "PREALBUMIN" in the last 48 hours.  Respiratory Culture: No results for input(s): "GSRESP", "RESPIRATORYC" in the last 48 hours.  Troponin: No results for input(s): "TROPONINI", "TROPONINIHS" in the last 48 hours.    TSH: No results for input(s): "TSH" in the last 4320 hours.  Urine Culture: No results for input(s): "LABURIN" in the last 48 hours.  Urine Studies:   No results for input(s): "COLORU", "APPEARANCEUA", "PHUR", "SPECGRAV", "PROTEINUA", "GLUCUA", "KETONESU", "BILIRUBINUA", "OCCULTUA", "NITRITE", "UROBILINOGEN", "LEUKOCYTESUR", "RBCUA", "WBCUA", "BACTERIA", "SQUAMEPITHEL", "HYALINECASTS" in the last 48 hours.    Invalid input(s): "WRIGHTSUTUNG"      Significant Imaging: I have reviewed all pertinent imaging results/findings within the past 24 hours.    MRI - severe stenosis and foraminal encroachment C6-7      Assessment/Plan:      * Type 2 diabetes mellitus with hyperosmolar hyperglycemic state (HHS)  Patient's FSGs are controlled on current medication " regimen.  Last A1c reviewed-   Lab Results   Component Value Date    HGBA1C 8.5 (H) 08/28/2023     Most recent fingerstick glucose reviewed-   Recent Labs   Lab 09/03/23  0459 09/03/23  0602 09/03/23  0714 09/03/23  0807   POCTGLUCOSE 141* 138* 139* 140*     Current correctional scale on insulin gtt   Maintain anti-hyperglycemic dose as follows-   Antihyperglycemics (From admission, onward)    Start     Stop Route Frequency Ordered    09/02/23 0345  insulin regular in 0.9 % NaCl 100 unit/100 mL (1 unit/mL) infusion        Question Answer Comment   Insulin Rate Adjustment (DO NOT MODIFY ANSWER) \\[email or web address] [email or web address] [facility] version [id number]    Initial dose (DO NOT CHANGE): 0.1 units/kg/hr        -- IV Continuous 09/02/23 0340        Hold Oral hypoglycemics while patient is in the hospital.    Beta hydroxybutyrate negative, trace ketones  Waiting for closure of anion gap and will start SC insulin and diet.  Likely triggered by steroid use.       8/30 Glucose levels improving.  S/s insulin in place.  Will decrease dextrose 75cc hour and d/c this afternoon.      8/31 Glucose levels improving.  Continue IV fluids.  Determir and s/s ordered     9/2  She is insulin naive for the most part so will cont to give d5 along with insulin drip to treat hypertrig. Hyperglycemia after corticosteroid improved.    9/3  Blood sugars are well controlled. NO hypoglycemia. Will titrate d5 if needed today.    Cervical radiculopathy  Seen on cervical xray  Pain is worse than baseline  MRI c spine pending   Gabapentin started     9/2  Will need NS f/u. For now, will avoid corticosteroid if possible.  Ask PT to see patient in house.  Control pain with opiates/nsaids as necessary.    9/3  Doing well with toradol. With h/o abuse asking NOT to take opiates.      Hypertriglyceridemia  >3k  Fenofibrate  [place] fish oil  Continue insulin  Dietician consult  Dietary and lifestyle modifications discussed.       9/2  Cont on insulin drip until <500. Will start on fenofibrate then.  Cont to monitor other labs and check trig q12.        Electrolyte abnormality  Phosphorus 1.5  IV phosphorus replacement     9/2  Factitious?  Improving with treatment via insulin and improved triglyceride levels.  Will cont in ICU for insulin drip and monitor.    Metabolic acidosis, increased anion gap  Anion gap elevated at admission and slowly improving   Will monitor and once closed will transition from insulin gtt to sc insulin.      8/31 Anion gap labile.  Unsure etiology.  Metabolic acidosis work up unrevealing so far.   Will trial determir to see response.  Once anion gap stabilizes she will need close endocrinology follow up.      9/1 Bicarb low but possibly due to hypertriglyceridemia.      9/2  Patient was never really acidotic. Yes she has dm and an increased anion gap. Hyperglycemia treated. Cont on insulin drip    9/3  Cont to treat triglycerides. Gap is closed. Bicarb MUCH improved.    POLY (acute kidney injury)  Patient with acute kidney injury/acute renal failure likely due to pre-renal azotemia due to dehydration POLY is currently improving. Baseline creatinine 0.8 - Labs reviewed- Renal function/electrolytes with Estimated Creatinine Clearance: 106.6 mL/min (based on SCr of 0.7 mg/dL). according to latest data. Monitor urine output and serial BMP and adjust therapy as needed. Avoid nephrotoxins and renally dose meds for GFR listed above.    Resolved     Former smoker  In remission         VTE Risk Mitigation (From admission, onward)         Ordered     enoxaparin injection 40 mg  Every 24 hours         08/28/23 2040     IP VTE HIGH RISK PATIENT  Once         08/28/23 2105     Place sequential compression device  Until discontinued         08/28/23 2105     Place sequential compression device  Until discontinued         08/28/23 1902     Place sequential compression device  Until discontinued         08/28/23 1853     Place  sequential compression device  Until discontinued         08/28/23 0526                Discharge Planning   DORON:      Code Status: Full Code   Is the patient medically ready for discharge?:     Reason for patient still in hospital (select all that apply): Treatment  Discharge Plan A: Home with family, Home            Critical care time spent on the evaluation and treatment of severe organ dysfunction, review of pertinent labs and imaging studies, discussions with consulting providers and discussions with patient/family: 25 minutes.      Linda Peck MD  Department of Hospital Medicine   [place] - Intensive Care

## 2023-09-03 NOTE — ASSESSMENT & PLAN NOTE
Anion gap elevated at admission and slowly improving   Will monitor and once closed will transition from insulin gtt to sc insulin.      8/31 Anion gap labile.  Unsure etiology.  Metabolic acidosis work up unrevealing so far.   Will trial determir to see response.  Once anion gap stabilizes she will need close endocrinology follow up.      9/1 Bicarb low but possibly due to hypertriglyceridemia.      9/2  Patient was never really acidotic. Yes she has dm and an increased anion gap. Hyperglycemia treated. Cont on insulin drip    9/3  Cont to treat triglycerides. Gap is closed. Bicarb MUCH improved.

## 2023-09-03 NOTE — EICU
Intervention Initiated From:  [facility] / [facility]    Jeannine intervened regarding:  Rounding (Video assessment)    Nurse Notified:  No    Doctor Notified:  No    Comments: Video rounding completed. Insulin infusing as per MAR. VSS. Asleep at present. NAD.

## 2023-09-04 VITALS
SYSTOLIC BLOOD PRESSURE: 144 MMHG | TEMPERATURE: 98 F | RESPIRATION RATE: 25 BRPM | BODY MASS INDEX: 28.6 KG/M2 | HEIGHT: 64 IN | WEIGHT: 167.56 LBS | DIASTOLIC BLOOD PRESSURE: 82 MMHG | OXYGEN SATURATION: 96 % | HEART RATE: 82 BPM

## 2023-09-04 LAB
ANION GAP SERPL CALC-SCNC: 11 MMOL/L (ref 8–16)
BUN SERPL-MCNC: 6 MG/DL (ref 6–20)
CALCIUM SERPL-MCNC: 8.6 MG/DL (ref 8.7–10.5)
CHLORIDE SERPL-SCNC: 103 MMOL/L (ref 95–110)
CO2 SERPL-SCNC: 21 MMOL/L (ref 23–29)
CREAT SERPL-MCNC: 0.6 MG/DL (ref 0.5–1.4)
EST. GFR  (NO RACE VARIABLE): >60 ML/MIN/1.73 M^2
GLUCOSE SERPL-MCNC: 151 MG/DL (ref 70–110)
POCT GLUCOSE: 122 MG/DL (ref 70–110)
POCT GLUCOSE: 129 MG/DL (ref 70–110)
POCT GLUCOSE: 134 MG/DL (ref 70–110)
POCT GLUCOSE: 136 MG/DL (ref 70–110)
POCT GLUCOSE: 139 MG/DL (ref 70–110)
POCT GLUCOSE: 148 MG/DL (ref 70–110)
POCT GLUCOSE: 149 MG/DL (ref 70–110)
POCT GLUCOSE: 151 MG/DL (ref 70–110)
POCT GLUCOSE: 153 MG/DL (ref 70–110)
POCT GLUCOSE: 154 MG/DL (ref 70–110)
POCT GLUCOSE: 181 MG/DL (ref 70–110)
POCT GLUCOSE: 186 MG/DL (ref 70–110)
POCT GLUCOSE: 197 MG/DL (ref 70–110)
POTASSIUM SERPL-SCNC: 3.9 MMOL/L (ref 3.5–5.1)
SODIUM SERPL-SCNC: 135 MMOL/L (ref 136–145)
TRIGL SERPL-MCNC: 1125 MG/DL (ref 30–150)
TRIGL SERPL-MCNC: 1183 MG/DL (ref 30–150)
TRIGL SERPL-MCNC: 1334 MG/DL (ref 30–150)

## 2023-09-04 PROCEDURE — 99238 HOSP IP/OBS DSCHRG MGMT 30/<: CPT | Mod: ,,, | Performed by: FAMILY MEDICINE

## 2023-09-04 PROCEDURE — 84478 ASSAY OF TRIGLYCERIDES: CPT | Mod: 91 | Performed by: PHYSICIAN ASSISTANT

## 2023-09-04 PROCEDURE — 36415 COLL VENOUS BLD VENIPUNCTURE: CPT | Performed by: PHYSICIAN ASSISTANT

## 2023-09-04 PROCEDURE — 25000003 PHARM REV CODE 250: Performed by: FAMILY MEDICINE

## 2023-09-04 PROCEDURE — 99238 PR HOSPITAL DISCHARGE DAY,<30 MIN: ICD-10-PCS | Mod: ,,, | Performed by: FAMILY MEDICINE

## 2023-09-04 PROCEDURE — 63600175 PHARM REV CODE 636 W HCPCS: Performed by: PHYSICIAN ASSISTANT

## 2023-09-04 PROCEDURE — 25000003 PHARM REV CODE 250: Performed by: PHYSICIAN ASSISTANT

## 2023-09-04 PROCEDURE — 99900035 HC TECH TIME PER 15 MIN (STAT)

## 2023-09-04 PROCEDURE — 80048 BASIC METABOLIC PNL TOTAL CA: CPT | Performed by: FAMILY MEDICINE

## 2023-09-04 PROCEDURE — 84478 ASSAY OF TRIGLYCERIDES: CPT | Performed by: PHYSICIAN ASSISTANT

## 2023-09-04 PROCEDURE — 36415 COLL VENOUS BLD VENIPUNCTURE: CPT | Performed by: FAMILY MEDICINE

## 2023-09-04 PROCEDURE — 63600175 PHARM REV CODE 636 W HCPCS: Performed by: FAMILY MEDICINE

## 2023-09-04 PROCEDURE — 94760 N-INVAS EAR/PLS OXIMETRY 1: CPT

## 2023-09-04 RX ORDER — OMEGA-3-ACID ETHYL ESTERS 1 G/1
2 CAPSULE, LIQUID FILLED ORAL 2 TIMES DAILY
Qty: 120 CAPSULE | Refills: 1 | Status: SHIPPED | OUTPATIENT
Start: 2023-09-04 | End: 2023-10-04

## 2023-09-04 RX ORDER — INSULIN DETEMIR 100 [IU]/ML
15 INJECTION, SOLUTION SUBCUTANEOUS NIGHTLY
Qty: 4.5 ML | Refills: 1 | Status: SHIPPED | OUTPATIENT
Start: 2023-09-04 | End: 2023-12-26

## 2023-09-04 RX ORDER — FENOFIBRATE 48 MG/1
48 TABLET, FILM COATED ORAL DAILY
Qty: 90 TABLET | Refills: 3 | Status: SHIPPED | OUTPATIENT
Start: 2023-09-05 | End: 2024-09-04

## 2023-09-04 RX ORDER — TRAMADOL HYDROCHLORIDE 100 MG/1
100 TABLET, COATED ORAL EVERY 8 HOURS PRN
Qty: 21 TABLET | Refills: 0 | Status: SHIPPED | OUTPATIENT
Start: 2023-09-04 | End: 2024-01-22

## 2023-09-04 RX ORDER — METFORMIN HYDROCHLORIDE 500 MG/1
500 TABLET ORAL 2 TIMES DAILY WITH MEALS
Qty: 60 TABLET | Refills: 1 | Status: SHIPPED | OUTPATIENT
Start: 2023-09-04 | End: 2023-10-30

## 2023-09-04 RX ORDER — GABAPENTIN 300 MG/1
300 CAPSULE ORAL 3 TIMES DAILY
Qty: 90 CAPSULE | Refills: 1 | Status: SHIPPED | OUTPATIENT
Start: 2023-09-04 | End: 2024-01-22

## 2023-09-04 RX ADMIN — OMEGA-3-ACID ETHYL ESTERS 1 G: 1 CAPSULE, LIQUID FILLED ORAL at 09:09

## 2023-09-04 RX ADMIN — DEXTROSE, SODIUM CHLORIDE, AND POTASSIUM CHLORIDE 250 ML/HR: 5; .45; .15 INJECTION INTRAVENOUS at 03:09

## 2023-09-04 RX ADMIN — GABAPENTIN 100 MG: 100 CAPSULE ORAL at 09:09

## 2023-09-04 RX ADMIN — TRAMADOL HYDROCHLORIDE 100 MG: 50 TABLET, COATED ORAL at 12:09

## 2023-09-04 RX ADMIN — ONDANSETRON HYDROCHLORIDE 4 MG: 2 INJECTION, SOLUTION INTRAMUSCULAR; INTRAVENOUS at 07:09

## 2023-09-04 RX ADMIN — SUCRALFATE ORAL 1 G: 1 SUSPENSION ORAL at 06:09

## 2023-09-04 RX ADMIN — LIDOCAINE 1 PATCH: 50 PATCH CUTANEOUS at 09:09

## 2023-09-04 RX ADMIN — FENOFIBRATE 48 MG: 48 TABLET ORAL at 09:09

## 2023-09-04 RX ADMIN — TRAMADOL HYDROCHLORIDE 100 MG: 50 TABLET, COATED ORAL at 09:09

## 2023-09-04 RX ADMIN — ASPIRIN 81 MG: 81 TABLET, COATED ORAL at 09:09

## 2023-09-04 RX ADMIN — SUCRALFATE ORAL 1 G: 1 SUSPENSION ORAL at 12:09

## 2023-09-04 RX ADMIN — KETOROLAC TROMETHAMINE 7.5 MG: 30 INJECTION, SOLUTION INTRAMUSCULAR; INTRAVENOUS at 07:09

## 2023-09-04 RX ADMIN — DEXTROSE, SODIUM CHLORIDE, AND POTASSIUM CHLORIDE: 5; .45; .15 INJECTION INTRAVENOUS at 07:09

## 2023-09-04 RX ADMIN — INSULIN HUMAN 0.1 UNITS/KG/HR: 1 INJECTION, SOLUTION INTRAVENOUS at 08:09

## 2023-09-04 RX ADMIN — DEXTROSE, SODIUM CHLORIDE, AND POTASSIUM CHLORIDE: 5; .45; .15 INJECTION INTRAVENOUS at 11:09

## 2023-09-04 NOTE — EICU
"Intervention Initiated From:  [facility] / EICU    Jeannine intervened regarding:  Rounding (Video assessment)    Nurse Notified:  No    Doctor Notified:  No    Comments: EICU rounding done. Pt awake in bed, family at bedside.  Per family member pt "just feels miserable".  Chart and meds reviewed.  VS stable.   "

## 2023-09-04 NOTE — DISCHARGE INSTRUCTIONS
When following up with Primary care ask for a prescription OR refills for:  -Blood glucose meter and testing supplies (strips and lancets)  -Pen needles if not already included with your insulin pen  OR insulin syringes if it is vials of insulin  -Sharps container    May also ask where you can take your sharps container to dispose of them when full. Such as a pharmacy or physicians office.      Discuss Shoulder and Neck pain with Primary care provider.

## 2023-09-04 NOTE — ASSESSMENT & PLAN NOTE
Phosphorus 1.5  IV phosphorus replacement     9/2  Factitious?  Improving with treatment via insulin and improved triglyceride levels.  Will cont in ICU for insulin drip and monitor.    9/4  Resolved

## 2023-09-04 NOTE — SUBJECTIVE & OBJECTIVE
Review of Systems   Constitutional:  Negative for chills, fatigue (improving) and fever.   HENT:  Negative for ear discharge and ear pain.    Eyes:  Negative for pain and discharge.   Respiratory:  Negative for cough and shortness of breath.    Cardiovascular:  Negative for chest pain and leg swelling.   Gastrointestinal:  Negative for nausea (resolved) and vomiting (resolved).   Endocrine: Negative for cold intolerance, heat intolerance, polydipsia and polyuria.   Genitourinary:  Negative for difficulty urinating and dysuria.   Musculoskeletal:  Positive for neck pain and neck stiffness. Negative for joint swelling and myalgias.   Skin:  Negative for rash and wound.   Neurological:  Negative for dizziness and headaches.   Psychiatric/Behavioral:  Negative for agitation and confusion.      Objective:     Vital Signs (Most Recent):  Temp: 97.8 °F (36.6 °C) (09/04/23 0712)  Pulse: 87 (09/04/23 0937)  Resp: 20 (09/04/23 0937)  BP: (!) 149/79 (09/04/23 0937)  SpO2: 96 % (09/04/23 0937) Vital Signs (24h Range):  Temp:  [97.8 °F (36.6 °C)-98.5 °F (36.9 °C)] 97.8 °F (36.6 °C)  Pulse:  [] 87  Resp:  [12-39] 20  SpO2:  [96 %-98 %] 96 %  BP: (122-149)/(58-95) 149/79     Weight: 76 kg (167 lb 8.8 oz)  Body mass index is 28.76 kg/m².     Physical Exam  Constitutional:       General: She is not in acute distress (neck pain).  HENT:      Head: Normocephalic and atraumatic.   Eyes:      General:         Right eye: No discharge.         Left eye: No discharge.   Cardiovascular:      Rate and Rhythm: Normal rate and regular rhythm.   Pulmonary:      Effort: Pulmonary effort is normal.      Breath sounds: Normal breath sounds.   Abdominal:      General: There is no distension.      Tenderness: There is no abdominal tenderness.   Musculoskeletal:         General: No swelling. Normal range of motion.      Cervical back: Rigidity and tenderness present.   Skin:     General: Skin is warm and dry.   Neurological:      General:  "No focal deficit present.      Mental Status: She is alert and oriented to person, place, and time.   Psychiatric:         Mood and Affect: Mood normal.         Behavior: Behavior normal.                Significant Labs: A1C:   Recent Labs   Lab 08/28/23  1732   HGBA1C 8.5*       ABGs: No results for input(s): "PH", "PCO2", "HCO3", "POCSATURATED", "BE", "TOTALHB", "COHB", "METHB", "O2HB", "POCFIO2", "PO2" in the last 48 hours.    Bilirubin:   Recent Labs   Lab 08/28/23  1546 08/28/23  1922 08/29/23  1258 09/02/23  0550   BILITOT 0.3 0.2 0.4 0.3       Blood Culture: No results for input(s): "LABBLOO" in the last 48 hours.  BMP:   Recent Labs   Lab 09/04/23  0600   *   *   K 3.9      CO2 21*   BUN 6   CREATININE 0.6   CALCIUM 8.6*       CBC:   No results for input(s): "WBC", "HGB", "HCT", "PLT" in the last 48 hours.    CMP:   Recent Labs   Lab 09/03/23  0600 09/03/23  1855 09/04/23  0600   * 132* 135*   K 4.0 3.9 3.9    101 103   CO2 19* 21* 21*   * 220* 151*   BUN 6 11 6   CREATININE 0.7 0.8 0.6   CALCIUM 9.1 9.0 8.6*   ANIONGAP 13 10 11       Cardiac Markers: No results for input(s): "CKMB", "MYOGLOBIN", "BNP", "TROPISTAT" in the last 48 hours.  Coagulation: No results for input(s): "PT", "INR", "APTT" in the last 48 hours.  Lactic Acid: No results for input(s): "LACTATE" in the last 48 hours.    Lipase: No results for input(s): "LIPASE" in the last 48 hours.    Lipid Panel:   Recent Labs   Lab 09/04/23  0557   TRIG 1,183*       Magnesium:   No results for input(s): "MG" in the last 48 hours.    POCT Glucose:   Recent Labs   Lab 09/04/23  0800 09/04/23  0859 09/04/23  1001   POCTGLUCOSE 136* 186* 197*       Prealbumin: No results for input(s): "PREALBUMIN" in the last 48 hours.  Respiratory Culture: No results for input(s): "GSRESP", "RESPIRATORYC" in the last 48 hours.  Troponin: No results for input(s): "TROPONINI", "TROPONINIHS" in the last 48 hours.    TSH: No results for " "input(s): "TSH" in the last 4320 hours.  Urine Culture: No results for input(s): "LABURIN" in the last 48 hours.  Urine Studies:   No results for input(s): "COLORU", "APPEARANCEUA", "PHUR", "SPECGRAV", "PROTEINUA", "GLUCUA", "KETONESU", "BILIRUBINUA", "OCCULTUA", "NITRITE", "UROBILINOGEN", "LEUKOCYTESUR", "RBCUA", "WBCUA", "BACTERIA", "SQUAMEPITHEL", "HYALINECASTS" in the last 48 hours.    Invalid input(s): "WRIGHTSUR"      Significant Imaging: I have reviewed all pertinent imaging results/findings within the past 24 hours.    MRI - severe stenosis and foraminal encroachment C6-7  "

## 2023-09-04 NOTE — ASSESSMENT & PLAN NOTE
Patient's FSGs are controlled on current medication regimen.  Last A1c reviewed-   Lab Results   Component Value Date    HGBA1C 8.5 (H) 08/28/2023     Most recent fingerstick glucose reviewed-   Recent Labs   Lab 09/04/23  0703 09/04/23  0800 09/04/23  0859 09/04/23  1001   POCTGLUCOSE 139* 136* 186* 197*     Current correctional scale on insulin gtt   Maintain anti-hyperglycemic dose as follows-   Antihyperglycemics (From admission, onward)    Start     Stop Route Frequency Ordered    09/02/23 0345  insulin regular in 0.9 % NaCl 100 unit/100 mL (1 unit/mL) infusion        Question Answer Comment   Insulin Rate Adjustment (DO NOT MODIFY ANSWER) [email or web address]\epic\Images\Pharmacy\InsulinInfusions\INSULIN ADJUSTMENT [facility] version [id number].pdf    Initial dose (DO NOT CHANGE): 0.1 units/kg/hr        -- IV Continuous 09/02/23 0340        Hold Oral hypoglycemics while patient is in the hospital.    Beta hydroxybutyrate negative, trace ketones  Waiting for closure of anion gap and will start SC insulin and diet.  Likely triggered by steroid use.       8/30 Glucose levels improving.  S/s insulin in place.  Will decrease dextrose 75cc hour and d/c this afternoon.      8/31 Glucose levels improving.  Continue IV fluids.  Determir and s/s ordered     9/2  She is insulin naive for the most part so will cont to give d5 along with insulin drip to treat hypertrig. Hyperglycemia after corticosteroid improved.    9/3  Blood sugars are well controlled. NO hypoglycemia. Will titrate d5 if needed today.    9/4  Home today ion sc insulin and metformin   F/u PCP this week and endo referral in

## 2023-09-04 NOTE — EICU
Intervention Initiated From:  COR / EICU    Jaennine intervened regarding:  Rounding (Video assessment)    Nurse Notified:  No    Doctor Notified:  No    Comments: Video rounding completed. Lying in bed in NAD, respirations even and unlabored. Insulin infusing as per MAR> Awake and alert. VSS.

## 2023-09-04 NOTE — DISCHARGE SUMMARY
[facility] Medicine  Discharge Summary      Patient Name: Kiana Hardy  MRN: [id number]  CARLOS: [id number]  Patient Class: IP- Inpatient  Admission Date: 8/28/2023  Hospital Length of Stay: 7 days  Discharge Date and Time:  09/04/2023 10:31 AM  Attending Physician: Linda Peck MD   Discharging Provider: Christopher Ward MD  Primary Care Provider: Grady Billings MD    Primary Care Team: Networked reference to record PCT     HPI:   Patient is a 38 year old female with medical history of DM type 2 on metformin and former tobacco use disorder who presented to the ED with polyuria.  Patient states on Thursday she noticed xerostomia.  Then on Saturday she went to urgent care for neck and should pain and was started on po steroids. Yesterday she felt fatigued, noticed polyuria and hyperglycemia.  She says she has not been follow up with her PCP or taking good care of herself.  She only sees a chiropractor for the past 5 months for neck and shoulder pain.  She denies fever, chills, CP, SOB, but had nausea and vomiting that is now resolved.        Admitted for HHS.               * No surgery found *      Hospital Course:   Patient taken off insulin gtt yesterday once anion gap closed but continues to remain elevated.  Glucose in better range.  Started on dextrose 1/2 normal saline with s/s insulin but expected patient to be able to go back home on metformin since A1C in the mid 8's.  Starting metabolic acidosis work up.      PT Hd stable on room air. Anion gap fluctuating.  Will continue IV fluids.  Continue sliding scale insulin.  Will continue to monitor.  Metabolic acidosis work up unremarkable so far.  Will trial long acting insulin.  Likely will need insulin at discharge and referral to endocrinology.      PT HD stable on room air.  She is having excruciating neck pain.  Attempting to get MRI since pain is worse.  Xray of right foot first due to h/o nail in foot.  Gabapentin  started for pain.  Started on fenofibrate, omega 3 and dietician consulted for hypertriglyceridemia.  Likely etiology of abnormal bicarbs.  Lipase trending down.      9/2  Moved to ICU yesterday afternoon to start on insulin drip to treat hypertriglyeridemia. Improved trends in all labs including triglycerides. MRI yesterday revealed severe cervical changes which explain patient's significant symptomatology. Clinically she is stable and labs are trending in right direction. No new or acute changes today.    9/3  Marked improvement in triglycerides - Co2 and lab derrangements are following. Neck pain is much improved. Anticipate 24 more hours in icu for insulin drip and hopefully home tomorrow. Pain is well controlled.    9/4  TRIGS improved. Gap closed and stable. Will plan for discharge this afternoon. She will be discharged on metformin and lantus or levemir 15 units qhs. Has f/u with PCP this week. Will refer to endocrinology as well.              Goals of Care Treatment Preferences:  Code Status: Full Code      Consults:   Consults (From admission, onward)        Status Ordering Provider     Inpatient consult to Registered Dietitian/Nutritionist  Once        Provider:  (Not yet assigned)    Completed BENEDICT GOLDSTEIN          Neuro  Cervical radiculopathy  Seen on cervical xray  Pain is worse than baseline  MRI c spine pending   Gabapentin started     9/2  Will need NS f/u. For now, will avoid corticosteroid if possible.  Ask PT to see patient in house.  Control pain with opiates/nsaids as necessary.    9/4  Home with tramadol, gabapentin. She will continue to see chiropractor as it was helping     9/3  Doing well with toradol. With h/o abuse asking NOT to take opiates.      Cardiac/Vascular  Hypertriglyceridemia  >3k  Fenofibrate  [place] fish oil  Continue insulin  Dietician consult  Dietary and lifestyle modifications discussed.      9/2  Cont on insulin drip until <500. Will start on fenofibrate  then.  Cont to monitor other labs and check trig q12.        Renal/  Electrolyte abnormality  Phosphorus 1.5  IV phosphorus replacement     9/2  Factitious?  Improving with treatment via insulin and improved triglyceride levels.  Will cont in ICU for insulin drip and monitor.    9/4  Resolved     Metabolic acidosis, increased anion gap  Anion gap elevated at admission and slowly improving   Will monitor and once closed will transition from insulin gtt to sc insulin.      8/31 Anion gap labile.  Unsure etiology.  Metabolic acidosis work up unrevealing so far.   Will trial determir to see response.  Once anion gap stabilizes she will need close endocrinology follow up.      9/1 Bicarb low but possibly due to hypertriglyceridemia.      9/2  Patient was never really acidotic. Yes she has dm and an increased anion gap. Hyperglycemia treated. Cont on insulin drip    9/3  Cont to treat triglycerides. Gap is closed. Bicarb MUCH improved.    9/4  Resolved     POLY (acute kidney injury)  Patient with acute kidney injury/acute renal failure likely due to pre-renal azotemia due to dehydration POLY is currently improving. Baseline creatinine 0.8 - Labs reviewed- Renal function/electrolytes with Estimated Creatinine Clearance: 124.4 mL/min (based on SCr of 0.6 mg/dL). according to latest data. Monitor urine output and serial BMP and adjust therapy as needed. Avoid nephrotoxins and renally dose meds for GFR listed above.    Resolved     Endocrine  * Type 2 diabetes mellitus with hyperosmolar hyperglycemic state (HHS)  Patient's FSGs are controlled on current medication regimen.  Last A1c reviewed-   Lab Results   Component Value Date    HGBA1C 8.5 (H) 08/28/2023     Most recent fingerstick glucose reviewed-   Recent Labs   Lab 09/04/23  0703 09/04/23  0800 09/04/23  0859 09/04/23  1001   POCTGLUCOSE 139* 136* 186* 197*     Current correctional scale on insulin gtt   Maintain anti-hyperglycemic dose as follows-   Antihyperglycemics  (From admission, onward)    Start     Stop Route Frequency Ordered    09/02/23 0345  insulin regular in 0.9 % NaCl 100 unit/100 mL (1 unit/mL) infusion        Question Answer Comment   Insulin Rate Adjustment (DO NOT MODIFY ANSWER) [email or web address]\epic\Images\Pharmacy\InsulinInfusions\INSULIN ADJUSTMENT [facility] version [id number].pdf    Initial dose (DO NOT CHANGE): 0.1 units/kg/hr        -- IV Continuous 09/02/23 0340        Hold Oral hypoglycemics while patient is in the hospital.    Beta hydroxybutyrate negative, trace ketones  Waiting for closure of anion gap and will start SC insulin and diet.  Likely triggered by steroid use.       8/30 Glucose levels improving.  S/s insulin in place.  Will decrease dextrose 75cc hour and d/c this afternoon.      8/31 Glucose levels improving.  Continue IV fluids.  Determir and s/s ordered     9/2  She is insulin naive for the most part so will cont to give d5 along with insulin drip to treat hypertrig. Hyperglycemia after corticosteroid improved.    9/3  Blood sugars are well controlled. NO hypoglycemia. Will titrate d5 if needed today.    9/4  Home today ion sc insulin and metformin   F/u PCP this week and endo referral in     Other  Former smoker  In remission         Final Active Diagnoses:    Diagnosis Date Noted POA    PRINCIPAL PROBLEM:  Type 2 diabetes mellitus with hyperosmolar hyperglycemic state (HHS) [E11.00] 08/07/2018 Yes    Hypertriglyceridemia [E78.1] 09/01/2023 Yes    Cervical radiculopathy [M54.12] 09/01/2023 Yes    Electrolyte abnormality [E87.8] 08/31/2023 Yes    POLY (acute kidney injury) [N17.9] 08/29/2023 Yes    Metabolic acidosis, increased anion gap [E87.29] 08/29/2023 Yes    Former smoker [Z87.891] 08/07/2018 Not Applicable      Problems Resolved During this Admission:       Discharged Condition: good    Disposition: Home or Self Care    Follow Up:   Follow-up Information     Grady Billings MD. Schedule an appointment as soon as possible for a visit in  "1 week(s).    Specialty: Internal Medicine  Contact information:  [address]  [place] [address]  [phone]             Dez Brush MD Follow up in 2 day(s).    Specialty: Endocrinology  Contact information:  [address] [facility] [address]  [phone]                       Patient Instructions:   No discharge procedures on file.    Significant Diagnostic Studies: Labs:   BMP:   Recent Labs   Lab 09/03/23  0600 09/03/23  1855 09/04/23  0600   * 220* 151*   * 132* 135*   K 4.0 3.9 3.9    101 103   CO2 19* 21* 21*   BUN 6 11 6   CREATININE 0.7 0.8 0.6   CALCIUM 9.1 9.0 8.6*       Pending Diagnostic Studies:     Procedure Component Value Units Date/Time    Triglycerides [[id number]]     Order Status: Sent Lab Status: No result     Specimen: Blood          Medications:  Reconciled Home Medications:      Medication List      START taking these medications    fenofibrate 48 MG tablet  Commonly known as: TRICOR  Take 1 tablet (48 mg total) by mouth once daily.  Start taking on: September 5, 2023     gabapentin 300 MG capsule  Commonly known as: NEURONTIN  Take 1 capsule (300 mg total) by mouth 3 (three) times daily.     LEVEMIR FLEXPEN 100 unit/mL (3 mL) Inpn pen  Generic drug: insulin detemir U-100 (Levemir)  Inject 15 Units into the skin every evening.     traMADoL 100 mg Tab  Take 100 mg by mouth every 8 (eight) hours as needed for Pain.        CHANGE how you take these medications    metFORMIN 500 MG tablet  Commonly known as: GLUCOPHAGE  Take 1 tablet (500 mg total) by mouth 2 (two) times daily with meals.  What changed:   · medication strength  · how much to take        CONTINUE taking these medications    aspirin 81 MG EC tablet  Commonly known as: ECOTRIN  Take 81 mg by mouth once daily.     BD INSULIN SYRINGE ULTRA-FINE 0.3 mL 31 gauge x 5/16" Syrg  Generic drug: insulin syringe-needle U-100  Use in the evening as directed.     BD INSULIN SYRINGE ULTRA-FINE 0.5 mL 31 " "gauge x 5/16" Syrg  Generic drug: insulin syringe-needle U-100  Inject 1 Syringe into the skin once daily.     BD ULTRA-FINE ORIG PEN NEEDLE 29 gauge x 1/2" Ndle  Generic drug: pen needle, diabetic  Use four times daily with insulin pens     diclofenac 50 MG tablet  Commonly known as: CATAFLAM  Take 50 mg by mouth every 8 (eight) hours as needed (pain). x7 days     * TRUE METRIX GLUCOSE TEST STRIP Strp  Generic drug: blood sugar diagnostic     * blood sugar diagnostic Strp  Commonly known as: RELION PRIME TEST STRIPS  1 strip by Misc.(Non-Drug; Combo Route) route 4 (four) times daily.     TRUEPLUS LANCETS 28 gauge Misc  Generic drug: lancets         * This list has 2 medication(s) that are the same as other medications prescribed for you. Read the directions carefully, and ask your doctor or other care provider to review them with you.            STOP taking these medications    cyclobenzaprine 10 MG tablet  Commonly known as: FLEXERIL     predniSONE 50 MG Tab  Commonly known as: DELTASONE            Indwelling Lines/Drains at time of discharge:   Lines/Drains/Airways     None                 Time spent on the discharge of patient: 35 minutes    Critical care time spent on the evaluation and treatment of severe organ dysfunction, review of pertinent labs and imaging studies, discussions with consulting providers and discussions with patient/family: 35 minutes.     Christopher Ward MD  Department of Hospital Medicine  [facility] - Intensive Care  "

## 2023-09-04 NOTE — ASSESSMENT & PLAN NOTE
Patient with acute kidney injury/acute renal failure likely due to pre-renal azotemia due to dehydration POLY is currently improving. Baseline creatinine 0.8 - Labs reviewed- Renal function/electrolytes with Estimated Creatinine Clearance: 124.4 mL/min (based on SCr of 0.6 mg/dL). according to latest data. Monitor urine output and serial BMP and adjust therapy as needed. Avoid nephrotoxins and renally dose meds for GFR listed above.    Resolved

## 2023-09-04 NOTE — ASSESSMENT & PLAN NOTE
Patient's FSGs are controlled on current medication regimen.  Last A1c reviewed-   Lab Results   Component Value Date    HGBA1C 8.5 (H) 08/28/2023     Most recent fingerstick glucose reviewed-   Recent Labs   Lab 09/04/23  0703 09/04/23  0800 09/04/23  0859 09/04/23  1001   POCTGLUCOSE 139* 136* 186* 197*     Current correctional scale on insulin gtt   Maintain anti-hyperglycemic dose as follows-   Antihyperglycemics (From admission, onward)    Start     Stop Route Frequency Ordered    09/02/23 0345  insulin regular in 0.9 % NaCl 100 unit/100 mL (1 unit/mL) infusion        Question Answer Comment   Insulin Rate Adjustment (DO NOT MODIFY ANSWER) [email or web address]\epic\Images\Pharmacy\InsulinInfusions\INSULIN ADJUSTMENT [facility] version [id number].pdf    Initial dose (DO NOT CHANGE): 0.1 units/kg/hr        -- IV Continuous 09/02/23 0340        Hold Oral hypoglycemics while patient is in the hospital.    Beta hydroxybutyrate negative, trace ketones  Waiting for closure of anion gap and will start SC insulin and diet.  Likely triggered by steroid use.       8/30 Glucose levels improving.  S/s insulin in place.  Will decrease dextrose 75cc hour and d/c this afternoon.      8/31 Glucose levels improving.  Continue IV fluids.  Determir and s/s ordered     9/2  She is insulin naive for the most part so will cont to give d5 along with insulin drip to treat hypertrig. Hyperglycemia after corticosteroid improved.    9/3  Blood sugars are well controlled. NO hypoglycemia. Will titrate d5 if needed today.    9/4  Home today ion sc insulin and metformin   F/u PCP this week and endo referral in

## 2023-09-04 NOTE — PROGRESS NOTES
[facility] Medicine  Progress Note    Patient Name: Kiana Hardy  MRN: [id number]  Patient Class: IP- Inpatient   Admission Date: 8/28/2023  Length of Stay: 7 days  Attending Physician: Linda Peck MD  Primary Care Provider: Grady Billings MD        Subjective:     Principal Problem:Type 2 diabetes mellitus with hyperosmolar hyperglycemic state (HHS)        HPI:  Patient is a 38 year old female with medical history of DM type 2 on metformin and former tobacco use disorder who presented to the ED with polyuria.  Patient states on Thursday she noticed xerostomia.  Then on Saturday she went to urgent care for neck and should pain and was started on po steroids. Yesterday she felt fatigued, noticed polyuria and hyperglycemia.  She says she has not been follow up with her PCP or taking good care of herself.  She only sees a chiropractor for the past 5 months for neck and shoulder pain.  She denies fever, chills, CP, SOB, but had nausea and vomiting that is now resolved.        Admitted for HHS.               Overview/Hospital Course:  Patient taken off insulin gtt yesterday once anion gap closed but continues to remain elevated.  Glucose in better range.  Started on dextrose 1/2 normal saline with s/s insulin but expected patient to be able to go back home on metformin since A1C in the mid 8's.  Starting metabolic acidosis work up.      PT Hd stable on room air. Anion gap fluctuating.  Will continue IV fluids.  Continue sliding scale insulin.  Will continue to monitor.  Metabolic acidosis work up unremarkable so far.  Will trial long acting insulin.  Likely will need insulin at discharge and referral to endocrinology.      PT HD stable on room air.  She is having excruciating neck pain.  Attempting to get MRI since pain is worse.  Xray of right foot first due to h/o nail in foot.  Gabapentin started for pain.  Started on fenofibrate, omega 3 and dietician consulted for  hypertriglyceridemia.  Likely etiology of abnormal bicarbs.  Lipase trending down.      9/2  Moved to ICU yesterday afternoon to start on insulin drip to treat hypertriglyeridemia. Improved trends in all labs including triglycerides. MRI yesterday revealed severe cervical changes which explain patient's significant symptomatology. Clinically she is stable and labs are trending in right direction. No new or acute changes today.    9/3  Marked improvement in triglycerides - Co2 and lab derrangements are following. Neck pain is much improved. Anticipate 24 more hours in icu for insulin drip and hopefully home tomorrow. Pain is well controlled.    9/4  TRIGS improved. Gap closed and stable. Will plan for discharge this afternoon. She will be discharged on metformin and lantus or levemir 15 units qhs. Has f/u with PCP this week. Will refer to endocrinology as well.                   Review of Systems   Constitutional:  Negative for chills, fatigue (improving) and fever.   HENT:  Negative for ear discharge and ear pain.    Eyes:  Negative for pain and discharge.   Respiratory:  Negative for cough and shortness of breath.    Cardiovascular:  Negative for chest pain and leg swelling.   Gastrointestinal:  Negative for nausea (resolved) and vomiting (resolved).   Endocrine: Negative for cold intolerance, heat intolerance, polydipsia and polyuria.   Genitourinary:  Negative for difficulty urinating and dysuria.   Musculoskeletal:  Positive for neck pain and neck stiffness. Negative for joint swelling and myalgias.   Skin:  Negative for rash and wound.   Neurological:  Negative for dizziness and headaches.   Psychiatric/Behavioral:  Negative for agitation and confusion.      Objective:     Vital Signs (Most Recent):  Temp: 97.8 °F (36.6 °C) (09/04/23 0712)  Pulse: 87 (09/04/23 0937)  Resp: 20 (09/04/23 0937)  BP: (!) 149/79 (09/04/23 0937)  SpO2: 96 % (09/04/23 0937) Vital Signs (24h Range):  Temp:  [97.8 °F (36.6 °C)-98.5 °F  "(36.9 °C)] 97.8 °F (36.6 °C)  Pulse:  [] 87  Resp:  [12-39] 20  SpO2:  [96 %-98 %] 96 %  BP: (122-149)/(58-95) 149/79     Weight: 76 kg (167 lb 8.8 oz)  Body mass index is 28.76 kg/m².     Physical Exam  Constitutional:       General: She is not in acute distress (neck pain).  HENT:      Head: Normocephalic and atraumatic.   Eyes:      General:         Right eye: No discharge.         Left eye: No discharge.   Cardiovascular:      Rate and Rhythm: Normal rate and regular rhythm.   Pulmonary:      Effort: Pulmonary effort is normal.      Breath sounds: Normal breath sounds.   Abdominal:      General: There is no distension.      Tenderness: There is no abdominal tenderness.   Musculoskeletal:         General: No swelling. Normal range of motion.      Cervical back: Rigidity and tenderness present.   Skin:     General: Skin is warm and dry.   Neurological:      General: No focal deficit present.      Mental Status: She is alert and oriented to person, place, and time.   Psychiatric:         Mood and Affect: Mood normal.         Behavior: Behavior normal.                Significant Labs: A1C:   Recent Labs   Lab 08/28/23  1732   HGBA1C 8.5*       ABGs: No results for input(s): "PH", "PCO2", "HCO3", "POCSATURATED", "BE", "TOTALHB", "COHB", "METHB", "O2HB", "POCFIO2", "PO2" in the last 48 hours.    Bilirubin:   Recent Labs   Lab 08/28/23  1546 08/28/23  1922 08/29/23  1258 09/02/23  0550   BILITOT 0.3 0.2 0.4 0.3       Blood Culture: No results for input(s): "LABBLOO" in the last 48 hours.  BMP:   Recent Labs   Lab 09/04/23  0600   *   *   K 3.9      CO2 21*   BUN 6   CREATININE 0.6   CALCIUM 8.6*       CBC:   No results for input(s): "WBC", "HGB", "HCT", "PLT" in the last 48 hours.    CMP:   Recent Labs   Lab 09/03/23  0600 09/03/23  1855 09/04/23  0600   * 132* 135*   K 4.0 3.9 3.9    101 103   CO2 19* 21* 21*   * 220* 151*   BUN 6 11 6   CREATININE 0.7 0.8 0.6   CALCIUM 9.1 " "9.0 8.6*   ANIONGAP 13 10 11       Cardiac Markers: No results for input(s): "CKMB", "MYOGLOBIN", "BNP", "TROPISTAT" in the last 48 hours.  Coagulation: No results for input(s): "PT", "INR", "APTT" in the last 48 hours.  Lactic Acid: No results for input(s): "LACTATE" in the last 48 hours.    Lipase: No results for input(s): "LIPASE" in the last 48 hours.    Lipid Panel:   Recent Labs   Lab 09/04/23  0557   TRIG 1,183*       Magnesium:   No results for input(s): "MG" in the last 48 hours.    POCT Glucose:   Recent Labs   Lab 09/04/23  0800 09/04/23  0859 09/04/23  1001   POCTGLUCOSE 136* 186* 197*       Prealbumin: No results for input(s): "PREALBUMIN" in the last 48 hours.  Respiratory Culture: No results for input(s): "GSRESP", "RESPIRATORYC" in the last 48 hours.  Troponin: No results for input(s): "TROPONINI", "TROPONINIHS" in the last 48 hours.    TSH: No results for input(s): "TSH" in the last 4320 hours.  Urine Culture: No results for input(s): "LABURIN" in the last 48 hours.  Urine Studies:   No results for input(s): "COLORU", "APPEARANCEUA", "PHUR", "SPECGRAV", "PROTEINUA", "GLUCUA", "KETONESU", "BILIRUBINUA", "OCCULTUA", "NITRITE", "UROBILINOGEN", "LEUKOCYTESUR", "RBCUA", "WBCUA", "BACTERIA", "SQUAMEPITHEL", "HYALINECASTS" in the last 48 hours.    Invalid input(s): "WRIGHTSUR"      Significant Imaging: I have reviewed all pertinent imaging results/findings within the past 24 hours.    MRI - severe stenosis and foraminal encroachment C6-7      Assessment/Plan:      * Type 2 diabetes mellitus with hyperosmolar hyperglycemic state (HHS)  Patient's FSGs are controlled on current medication regimen.  Last A1c reviewed-   Lab Results   Component Value Date    HGBA1C 8.5 (H) 08/28/2023     Most recent fingerstick glucose reviewed-   Recent Labs   Lab 09/04/23  0703 09/04/23  0800 09/04/23  0859 09/04/23  1001   POCTGLUCOSE 139* 136* 186* 197*     Current correctional scale on insulin gtt   Maintain " anti-hyperglycemic dose as follows-   Antihyperglycemics (From admission, onward)    Start     Stop Route Frequency Ordered    09/02/23 0345  insulin regular in 0.9 % NaCl 100 unit/100 mL (1 unit/mL) infusion        Question Answer Comment   Insulin Rate Adjustment (DO NOT MODIFY ANSWER) [email or web address] [email or web address] [facility] version [email or web address]    Initial dose (DO NOT CHANGE): 0.1 units/kg/hr        -- IV Continuous 09/02/23 0340        Hold Oral hypoglycemics while patient is in the hospital.    Beta hydroxybutyrate negative, trace ketones  Waiting for closure of anion gap and will start SC insulin and diet.  Likely triggered by steroid use.       8/30 Glucose levels improving.  S/s insulin in place.  Will decrease dextrose 75cc hour and d/c this afternoon.      8/31 Glucose levels improving.  Continue IV fluids.  Determir and s/s ordered     9/2  She is insulin naive for the most part so will cont to give d5 along with insulin drip to treat hypertrig. Hyperglycemia after corticosteroid improved.    9/3  Blood sugars are well controlled. NO hypoglycemia. Will titrate d5 if needed today.    9/4  Home today ion sc insulin and metformin   F/u PCP this week and endo referral in     Cervical radiculopathy  Seen on cervical xray  Pain is worse than baseline  MRI c spine pending   Gabapentin started     9/2  Will need NS f/u. For now, will avoid corticosteroid if possible.  Ask PT to see patient in house.  Control pain with opiates/nsaids as necessary.    9/4  Home with tramadol, gabapentin. She will continue to see chiropractor as it was helping     9/3  Doing well with toradol. With h/o abuse asking NOT to take opiates.      Hypertriglyceridemia  >3k  Fenofibrate  [place] fish oil  Continue insulin  Dietician consult  Dietary and lifestyle modifications discussed.      9/2  Cont on insulin drip until <500. Will start on fenofibrate then.  Cont to monitor other labs and check trig  q12.        Electrolyte abnormality  Phosphorus 1.5  IV phosphorus replacement     9/2  Factitious?  Improving with treatment via insulin and improved triglyceride levels.  Will cont in ICU for insulin drip and monitor.    9/4  Resolved     Metabolic acidosis, increased anion gap  Anion gap elevated at admission and slowly improving   Will monitor and once closed will transition from insulin gtt to sc insulin.      8/31 Anion gap labile.  Unsure etiology.  Metabolic acidosis work up unrevealing so far.   Will trial determir to see response.  Once anion gap stabilizes she will need close endocrinology follow up.      9/1 Bicarb low but possibly due to hypertriglyceridemia.      9/2  Patient was never really acidotic. Yes she has dm and an increased anion gap. Hyperglycemia treated. Cont on insulin drip    9/3  Cont to treat triglycerides. Gap is closed. Bicarb MUCH improved.    9/4  Resolved     POLY (acute kidney injury)  Patient with acute kidney injury/acute renal failure likely due to pre-renal azotemia due to dehydration POLY is currently improving. Baseline creatinine 0.8 - Labs reviewed- Renal function/electrolytes with Estimated Creatinine Clearance: 124.4 mL/min (based on SCr of 0.6 mg/dL). according to latest data. Monitor urine output and serial BMP and adjust therapy as needed. Avoid nephrotoxins and renally dose meds for GFR listed above.    Resolved     Former smoker  In remission         VTE Risk Mitigation (From admission, onward)         Ordered     enoxaparin injection 40 mg  Every 24 hours         08/28/23 2040     IP VTE HIGH RISK PATIENT  Once         08/28/23 2105     Place sequential compression device  Until discontinued         08/28/23 1902                Discharge Planning   DORON:      Code Status: Full Code   Is the patient medically ready for discharge?:     Reason for patient still in hospital (select all that apply): Treatment  Discharge Plan A: Home with family, Home            Critical  care time spent on the evaluation and treatment of severe organ dysfunction, review of pertinent labs and imaging studies, discussions with consulting providers and discussions with patient/family: 35 minutes.      Christopher Ward MD  Department of Hospital Medicine   [facility] - Intensive Care

## 2023-09-04 NOTE — ASSESSMENT & PLAN NOTE
Anion gap elevated at admission and slowly improving   Will monitor and once closed will transition from insulin gtt to sc insulin.      8/31 Anion gap labile.  Unsure etiology.  Metabolic acidosis work up unrevealing so far.   Will trial determir to see response.  Once anion gap stabilizes she will need close endocrinology follow up.      9/1 Bicarb low but possibly due to hypertriglyceridemia.      9/2  Patient was never really acidotic. Yes she has dm and an increased anion gap. Hyperglycemia treated. Cont on insulin drip    9/3  Cont to treat triglycerides. Gap is closed. Bicarb MUCH improved.    9/4  Resolved

## 2023-09-04 NOTE — ASSESSMENT & PLAN NOTE
Seen on cervical xray  Pain is worse than baseline  MRI c spine pending   Gabapentin started     9/2  Will need NS f/u. For now, will avoid corticosteroid if possible.  Ask PT to see patient in house.  Control pain with opiates/nsaids as necessary.    9/4  Home with tramadol, gabapentin. She will continue to see chiropractor as it was helping     9/3  Doing well with toradol. With h/o abuse asking NOT to take opiates.

## 2023-10-29 NOTE — TELEPHONE ENCOUNTER
Refill Routing Note   Medication(s) are not appropriate for processing by [facility] for the following reason(s):      Patient not seen by provider within 15 months  Patient seen in ED/Hospital since LOV with provider  New or recently adjusted medication  Responsible provider unclear    [facility] action(s):  Defer Care Due:  None identified            Appointments  past 12m or future 3m with PCP    Date Provider   Last Visit   Visit date not found Christopher Ward MD   Next Visit   Visit date not found Christopher Ward MD   ED visits in past 90 days: 0        Note composed:11:48 AM 10/29/2023

## 2023-10-30 RX ORDER — METFORMIN HYDROCHLORIDE 500 MG/1
500 TABLET ORAL 2 TIMES DAILY WITH MEALS
Qty: 180 TABLET | Refills: 0 | Status: SHIPPED | OUTPATIENT
Start: 2023-10-30 | End: 2024-01-22

## 2023-12-04 PROBLEM — N17.9 AKI (ACUTE KIDNEY INJURY): Status: RESOLVED | Noted: 2023-08-29 | Resolved: 2023-12-04

## 2023-12-26 RX ORDER — INSULIN DETEMIR 100 [IU]/ML
INJECTION, SOLUTION SUBCUTANEOUS
Qty: 5 ML | Refills: 0 | Status: SHIPPED | OUTPATIENT
Start: 2023-12-26 | End: 2024-02-18

## 2023-12-26 NOTE — TELEPHONE ENCOUNTER
Refill Routing Note   Medication(s) are not appropriate for processing by [facility] for the following reason(s):        Responsible provider unclear  ED/Hospital Visit since last OV with provider  Patient not seen by provider within 15 months    [facility] action(s):  Defer               Appointments  past 12m or future 3m with PCP    Date Provider   Last Visit   Visit date not found Christopher Ward MD   Next Visit   Visit date not found Christopher Ward MD   ED visits in past 90 days: 0        Note composed:4:36 PM 12/26/2023

## 2024-01-02 ENCOUNTER — TELEPHONE (OUTPATIENT)
Dept: ENDOCRINOLOGY | Facility: CLINIC | Age: 39
End: 2024-01-02
Payer: MEDICAID

## 2024-01-22 ENCOUNTER — OFFICE VISIT (OUTPATIENT)
Dept: ENDOCRINOLOGY | Facility: CLINIC | Age: 39
End: 2024-01-22
Payer: MEDICAID

## 2024-01-22 VITALS
BODY MASS INDEX: 26.46 KG/M2 | HEART RATE: 98 BPM | WEIGHT: 155 LBS | DIASTOLIC BLOOD PRESSURE: 74 MMHG | SYSTOLIC BLOOD PRESSURE: 133 MMHG | HEIGHT: 64 IN

## 2024-01-22 DIAGNOSIS — Z79.4 TYPE 2 DIABETES MELLITUS WITH HYPERGLYCEMIA, WITH LONG-TERM CURRENT USE OF INSULIN: Primary | ICD-10-CM

## 2024-01-22 DIAGNOSIS — E11.65 TYPE 2 DIABETES MELLITUS WITH HYPERGLYCEMIA, WITH LONG-TERM CURRENT USE OF INSULIN: Primary | ICD-10-CM

## 2024-01-22 DIAGNOSIS — E78.1 HYPERTRIGLYCERIDEMIA: ICD-10-CM

## 2024-01-22 PROCEDURE — 4010F ACE/ARB THERAPY RXD/TAKEN: CPT | Mod: CPTII,,, | Performed by: STUDENT IN AN ORGANIZED HEALTH CARE EDUCATION/TRAINING PROGRAM

## 2024-01-22 PROCEDURE — 3078F DIAST BP <80 MM HG: CPT | Mod: CPTII,,, | Performed by: STUDENT IN AN ORGANIZED HEALTH CARE EDUCATION/TRAINING PROGRAM

## 2024-01-22 PROCEDURE — 99204 OFFICE O/P NEW MOD 45 MIN: CPT | Mod: S$PBB,,, | Performed by: STUDENT IN AN ORGANIZED HEALTH CARE EDUCATION/TRAINING PROGRAM

## 2024-01-22 PROCEDURE — 3075F SYST BP GE 130 - 139MM HG: CPT | Mod: CPTII,,, | Performed by: STUDENT IN AN ORGANIZED HEALTH CARE EDUCATION/TRAINING PROGRAM

## 2024-01-22 PROCEDURE — 1160F RVW MEDS BY RX/DR IN RCRD: CPT | Mod: CPTII,,, | Performed by: STUDENT IN AN ORGANIZED HEALTH CARE EDUCATION/TRAINING PROGRAM

## 2024-01-22 PROCEDURE — G2211 COMPLEX E/M VISIT ADD ON: HCPCS | Mod: S$PBB,,, | Performed by: STUDENT IN AN ORGANIZED HEALTH CARE EDUCATION/TRAINING PROGRAM

## 2024-01-22 PROCEDURE — 99999 PR PBB SHADOW E&M-EST. PATIENT-LVL IV: CPT | Mod: PBBFAC,,, | Performed by: STUDENT IN AN ORGANIZED HEALTH CARE EDUCATION/TRAINING PROGRAM

## 2024-01-22 PROCEDURE — 99214 OFFICE O/P EST MOD 30 MIN: CPT | Mod: PBBFAC | Performed by: STUDENT IN AN ORGANIZED HEALTH CARE EDUCATION/TRAINING PROGRAM

## 2024-01-22 PROCEDURE — 3008F BODY MASS INDEX DOCD: CPT | Mod: CPTII,,, | Performed by: STUDENT IN AN ORGANIZED HEALTH CARE EDUCATION/TRAINING PROGRAM

## 2024-01-22 PROCEDURE — 1159F MED LIST DOCD IN RCRD: CPT | Mod: CPTII,,, | Performed by: STUDENT IN AN ORGANIZED HEALTH CARE EDUCATION/TRAINING PROGRAM

## 2024-01-22 RX ORDER — KETOCONAZOLE 20 MG/G
CREAM TOPICAL
COMMUNITY
Start: 2024-01-06

## 2024-01-22 RX ORDER — BLOOD-GLUCOSE SENSOR
1 EACH MISCELLANEOUS
Qty: 3 EACH | Refills: 11 | Status: SHIPPED | OUTPATIENT
Start: 2024-01-22 | End: 2025-01-21

## 2024-01-22 RX ORDER — METFORMIN HYDROCHLORIDE 500 MG/1
1000 TABLET, EXTENDED RELEASE ORAL 2 TIMES DAILY WITH MEALS
Qty: 360 TABLET | Refills: 3 | Status: SHIPPED | OUTPATIENT
Start: 2024-01-22 | End: 2025-01-21

## 2024-01-22 RX ORDER — VALSARTAN 80 MG/1
80 TABLET ORAL
COMMUNITY
Start: 2024-01-18

## 2024-01-22 RX ORDER — BLOOD-GLUCOSE,RECEIVER,CONT
EACH MISCELLANEOUS
Qty: 1 EACH | Refills: 0 | Status: SHIPPED | OUTPATIENT
Start: 2024-01-22

## 2024-01-22 NOTE — ASSESSMENT & PLAN NOTE
Uncontrolled based on A1c and reported POCT glucose with significant glycemic variability.    Start CGM given insulin use. Increase metformin to max dose.    Based on CGM results, consider GLP-1 RA      Plan  - Start Dexcom G7  - Increase to metformin 1,000 mg BID  - Continue Levemir 15 U SH    Review CGM in 1 week    Reports recent outside labs, will get records

## 2024-01-22 NOTE — ASSESSMENT & PLAN NOTE
Explained correlation with uncontrolled diabetes  Will address as above  Continue fibrate  Recheck lipids once glucose better controleld

## 2024-01-22 NOTE — PROGRESS NOTES
"Subjective:      Patient ID: Kiana Hardy is a 38 y.o. female.    Chief Complaint:  Type 2 diabetes mellitus    History of Present Illness  This is a 38 y.o. female. with a past medical history of type 2 diabetes mellitus, HTN, HLD here for evaluation.    Type 2 diabetes mellitus  Diagnosed around age 23    Current diabetes medications:  - Metformin 500 mg BID  - Levemir 15 U HS    Past diabetes medications:  - Januvia  - Janumet    Lab Results   Component Value Date    CREATININE 0.6 09/04/2023    EGFRNORACEVR >60 09/04/2023       Known diabetic complications: HHS, mild DR    Weight trend:  Wt Readings from Last 8 Encounters:   01/22/24 70.3 kg (155 lb)   08/31/23 76 kg (167 lb 8.8 oz)   04/22/23 70.9 kg (156 lb 6.7 oz)   05/18/21 70.3 kg (155 lb)   03/27/20 70.6 kg (155 lb 9.6 oz)   02/21/20 72.4 kg (159 lb 9.6 oz)   02/14/20 84.4 kg (186 lb)   02/12/20 84.8 kg (187 lb)     BMI Readings from Last 1 Encounters:   01/22/24 26.61 kg/m²       Family history of diabetes:  Parents, all grandparents, siblings    Prior visit with diabetes education: Yes    Current diet: Trying to cut processed carbs    Blood glucose monitoring at home: 200s      Outside labs  9/7/23  A1c 10.5%  LDL N/A, HDL 31, Tg 544        Diabetes Management Status  Statin: Not taking  ACE/ARB: Not taking    Screening or Prevention Patient's value   HgA1C Testing and Control   Lab Results   Component Value Date    HGBA1C 8.5 (H) 08/28/2023        LDL control Lab Results   Component Value Date    LDLCALC Invalid, Trig>400.0 08/31/2023      Nephropathy screening No results found for: "MICALBCREAT"     Lab Results   Component Value Date    TSH 1.792 12/06/2019       Last eye exam: : 08/07/2018      Review of Systems  As above    Social and family history reviewed  Current medications and allergies reviewed    Objective:   /74 (BP Location: Right arm, Patient Position: Sitting, BP Method: Medium (Manual))   Pulse 98   Ht 5' 4" (1.626 m)  "  Wt 70.3 kg (155 lb)   BMI 26.61 kg/m²   Physical Exam  Alert, oriented    BP Readings from Last 1 Encounters:   01/22/24 133/74      Wt Readings from Last 1 Encounters:   01/22/24 0959 70.3 kg (155 lb)     Body mass index is 26.61 kg/m².    Lab Review:   Lab Results   Component Value Date    HGBA1C 8.5 (H) 08/28/2023     Lab Results   Component Value Date    CHOL 596 (H) 08/31/2023    HDL 23 (L) 08/31/2023    LDLCALC Invalid, Trig>400.0 08/31/2023    TRIG 1,125 (H) 09/04/2023    CHOLHDL 3.9 (L) 08/31/2023     Lab Results   Component Value Date     (L) 09/04/2023    K 3.9 09/04/2023     09/04/2023    CO2 21 (L) 09/04/2023     (H) 09/04/2023    BUN 6 09/04/2023    CREATININE 0.6 09/04/2023    CALCIUM 8.6 (L) 09/04/2023    PROT 7.1 09/02/2023    ALBUMIN 3.2 (L) 09/02/2023    BILITOT 0.3 09/02/2023    ALKPHOS 89 09/02/2023    AST 44 (H) 09/02/2023    ALT 75 (H) 09/02/2023    ANIONGAP 11 09/04/2023    ESTGFRAFRICA >60 02/14/2020    EGFRNONAA >60 02/14/2020    TSH 1.792 12/06/2019       All pertinent labs reviewed    Assessment and Plan     Type 2 diabetes mellitus with hyperglycemia, with long-term current use of insulin  Uncontrolled based on A1c and reported POCT glucose with significant glycemic variability.    Start CGM given insulin use. Increase metformin to max dose.    Based on CGM results, consider GLP-1 RA      Plan  - Start Dexcom G7  - Increase to metformin 1,000 mg BID  - Continue Levemir 15 U SH    Review CGM in 1 week    Reports recent outside labs, will get records    BMI 26.0-26.9,adult  Consider GLP-1 RA based on CGM review    Hypertriglyceridemia  Explained correlation with uncontrolled diabetes  Will address as above  Continue fibrate  Recheck lipids once glucose better controleld      Follow-up in 3 months    Dez Brush MD  Endocrinology

## 2024-01-22 NOTE — PATIENT INSTRUCTIONS
Increase metformin to two tablets twice a day, take with meals. If you notice increased diarrhea let me know.    Continue Levemir 15 units every night    Based on the monitor review in one week, we will decide on starting Ozempic

## 2024-01-26 ENCOUNTER — TELEPHONE (OUTPATIENT)
Dept: ENDOCRINOLOGY | Facility: CLINIC | Age: 39
End: 2024-01-26
Payer: MEDICAID

## 2024-01-26 NOTE — TELEPHONE ENCOUNTER
Patient notified of message , verbalized understanding.        ----- Message from Dez Brush MD sent at 1/26/2024 12:36 PM CST -----  Regarding: RE: Med concerns  Ok to hold metformin until diarrhea resolves, once it resolves can restart at 500 mg twice a day which she was tolerating.    Continue Levemir 15 units.    Numbers have looked good on monitor so far but we will see how they look after lowering metformin. We may need to add something else depending on glucose on Dexcom.        ----- Message -----  From: Grace Dove MA  Sent: 1/26/2024   9:35 AM CST  To: Dez Brush MD  Subject: FW: Med concerns                                   ----- Message -----  From: Sushila Ryder MA  Sent: 1/26/2024   9:28 AM CST  To: [facility] Endocrinology Clinical Support Staff  Subject: Med concerns                                     Patient states that Metformin was changed at the last office visit and since then she is having diarrhea. She is not taking her medication today because the diarrhea is so bad. Please return this call.    Phone: [phone]

## 2024-01-29 ENCOUNTER — PATIENT MESSAGE (OUTPATIENT)
Dept: ENDOCRINOLOGY | Facility: CLINIC | Age: 39
End: 2024-01-29
Payer: MEDICAID

## 2024-01-29 DIAGNOSIS — Z79.4 TYPE 2 DIABETES MELLITUS WITH HYPERGLYCEMIA, WITH LONG-TERM CURRENT USE OF INSULIN: Primary | ICD-10-CM

## 2024-01-29 DIAGNOSIS — E11.65 TYPE 2 DIABETES MELLITUS WITH HYPERGLYCEMIA, WITH LONG-TERM CURRENT USE OF INSULIN: Primary | ICD-10-CM

## 2024-02-15 ENCOUNTER — HOSPITAL ENCOUNTER (OUTPATIENT)
Dept: RADIOLOGY | Facility: HOSPITAL | Age: 39
Discharge: HOME | End: 2024-02-15
Attending: PHYSICIAN ASSISTANT
Payer: MEDICAID

## 2024-02-15 VITALS — BODY MASS INDEX: 26.46 KG/M2 | WEIGHT: 155 LBS | HEIGHT: 64 IN

## 2024-02-15 DIAGNOSIS — Z12.31 ENCOUNTER FOR SCREENING MAMMOGRAM FOR MALIGNANT NEOPLASM OF BREAST: ICD-10-CM

## 2024-02-15 PROCEDURE — 77063 BREAST TOMOSYNTHESIS BI: CPT | Mod: 26,,, | Performed by: RADIOLOGY

## 2024-02-15 PROCEDURE — 77067 SCR MAMMO BI INCL CAD: CPT | Mod: TC

## 2024-02-15 PROCEDURE — 77067 SCR MAMMO BI INCL CAD: CPT | Mod: 26,,, | Performed by: RADIOLOGY

## 2024-02-16 NOTE — TELEPHONE ENCOUNTER
Refill Routing Note   Medication(s) are not appropriate for processing by [facility] for the following reason(s):        Responsible provider unclear    [facility] action(s):  Defer        Medication Therapy Plan: Unable to assess. PCP listed in navabi unable to accept [facility] messages. Will defer to last known prescriber for review.      Appointments  past 12m or future 3m with PCP    Date Provider   Last Visit   Visit date not found Christopher Ward MD   Next Visit   Visit date not found Christopher Ward MD   ED visits in past 90 days: 0        Note composed:7:15 AM 02/16/2024

## 2024-02-18 RX ORDER — INSULIN DETEMIR 100 [IU]/ML
INJECTION, SOLUTION SUBCUTANEOUS
Qty: 6 ML | Refills: 0 | Status: SHIPPED | OUTPATIENT
Start: 2024-02-18

## 2024-03-07 ENCOUNTER — PATIENT MESSAGE (OUTPATIENT)
Dept: ENDOCRINOLOGY | Facility: CLINIC | Age: 39
End: 2024-03-07
Payer: MEDICAID

## 2024-03-19 ENCOUNTER — PATIENT MESSAGE (OUTPATIENT)
Dept: ENDOCRINOLOGY | Facility: CLINIC | Age: 39
End: 2024-03-19
Payer: MEDICAID

## 2024-04-19 ENCOUNTER — TELEPHONE (OUTPATIENT)
Dept: ENDOCRINOLOGY | Facility: CLINIC | Age: 39
End: 2024-04-19
Payer: MEDICAID

## 2024-04-19 NOTE — TELEPHONE ENCOUNTER
----- Message from Mitzi Mejia sent at 2024  9:23 AM CDT -----  Contact: Patient  Kiana Hardy  MRN: [id number]  : 1985  PCP: Benjy Ward  Home Phone      [phone]  Work Phone      Not on file.  Mobile          [phone]      MESSAGE: Patient needs refills for the Dexcom G7 Sensors sent to [facility] in [place]    PHONE; [phone]

## 2024-05-10 ENCOUNTER — PATIENT MESSAGE (OUTPATIENT)
Dept: ENDOCRINOLOGY | Facility: CLINIC | Age: 39
End: 2024-05-10
Payer: MEDICAID

## 2024-05-10 DIAGNOSIS — Z79.4 TYPE 2 DIABETES MELLITUS WITH HYPERGLYCEMIA, WITH LONG-TERM CURRENT USE OF INSULIN: ICD-10-CM

## 2024-05-10 DIAGNOSIS — E11.65 TYPE 2 DIABETES MELLITUS WITH HYPERGLYCEMIA, WITH LONG-TERM CURRENT USE OF INSULIN: ICD-10-CM

## 2024-05-16 DIAGNOSIS — E11.65 TYPE 2 DIABETES MELLITUS WITH HYPERGLYCEMIA, WITH LONG-TERM CURRENT USE OF INSULIN: Primary | ICD-10-CM

## 2024-05-16 DIAGNOSIS — Z79.4 TYPE 2 DIABETES MELLITUS WITH HYPERGLYCEMIA, WITH LONG-TERM CURRENT USE OF INSULIN: Primary | ICD-10-CM

## 2024-05-16 RX ORDER — SEMAGLUTIDE 1.34 MG/ML
1 INJECTION, SOLUTION SUBCUTANEOUS
Qty: 3 ML | Refills: 11 | Status: SHIPPED | OUTPATIENT
Start: 2024-05-16 | End: 2025-05-16

## 2024-06-07 NOTE — PROGRESS NOTES
"Subjective:      Patient ID: Kiana Hardy is a 38 y.o. female.    Chief Complaint:  Type 2 diabetes mellitus    History of Present Illness  This is a 38 y.o. female. with a past medical history of type 2 diabetes mellitus, HTN, HLD here for evaluation.    Type 2 diabetes mellitus  Diagnosed around age 23    Current diabetes medications:  - Metformin 500 mg BID   - Ozempic 1 mg once weekly       Past diabetes medications:  - Januvia  - Janumet  - Levemir     Lab Results   Component Value Date    CREATININE 0.6 09/04/2023    EGFRNORACEVR >60 09/04/2023       Known diabetic complications: HHS, mild DR    Weight trend:  Wt Readings from Last 8 Encounters:   06/10/24 66.6 kg (146 lb 14.4 oz)   02/15/24 70.3 kg (155 lb)   01/22/24 70.3 kg (155 lb)   08/31/23 76 kg (167 lb 8.8 oz)   04/22/23 70.9 kg (156 lb 6.7 oz)   05/18/21 70.3 kg (155 lb)   03/27/20 70.6 kg (155 lb 9.6 oz)   02/21/20 72.4 kg (159 lb 9.6 oz)     BMI Readings from Last 1 Encounters:   06/10/24 24.60 kg/m²       Family history of diabetes:  Parents, all grandparents, siblings    Prior visit with diabetes education: Yes    Current diet: Trying to cut processed carbs    Blood glucose monitoring at home:             Outside labs  9/7/23  A1c 10.5%  LDL N/A, HDL 31, Tg 544        Diabetes Management Status  Statin: Not taking  ACE/ARB: Not taking    Screening or Prevention Patient's value   HgA1C Testing and Control   Lab Results   Component Value Date    HGBA1C 8.5 (H) 08/28/2023        LDL control Lab Results   Component Value Date    LDLCALC Invalid, Trig>400.0 08/31/2023      Nephropathy screening No results found for: "MICALBCREAT"     Lab Results   Component Value Date    TSH 1.792 12/06/2019       Last eye exam: : 08/07/2018    Abnormal TSH    Patient states she was told she had elevated TSH years ago and was on medication at that time. She has been off meds for 3 years. Denies constipation, diarrhea, anxiety, hair loss, palpitations, " "hot/cold intolerance.     Latest Reference Range & Units 07/24/19 06:05 08/02/19 14:07 09/12/19 17:35 12/06/19 10:55   TSH 0.400 - 4.000 uIU/mL 4.042 (H) 1.417 2.14 1.792   Free T4 0.71 - 1.51 ng/dL 0.85          Review of Systems  As above    Social and family history reviewed  Current medications and allergies reviewed    Objective:   BP (P) 118/74   Pulse 96   Resp 18   Ht 5' 4.8" (1.646 m)   Wt 66.6 kg (146 lb 14.4 oz)   SpO2 98%   BMI 24.60 kg/m²   Physical Exam  Alert, oriented    BP Readings from Last 1 Encounters:   06/10/24 (P) 118/74      Wt Readings from Last 1 Encounters:   06/10/24 0839 66.6 kg (146 lb 14.4 oz)     Body mass index is 24.6 kg/m².    Lab Review:   Lab Results   Component Value Date    HGBA1C 8.5 (H) 08/28/2023     Lab Results   Component Value Date    CHOL 596 (H) 08/31/2023    HDL 23 (L) 08/31/2023    LDLCALC Invalid, Trig>400.0 08/31/2023    TRIG 1,125 (H) 09/04/2023    CHOLHDL 3.9 (L) 08/31/2023     Lab Results   Component Value Date     (L) 09/04/2023    K 3.9 09/04/2023     09/04/2023    CO2 21 (L) 09/04/2023     (H) 09/04/2023    BUN 6 09/04/2023    CREATININE 0.6 09/04/2023    CALCIUM 8.6 (L) 09/04/2023    PROT 7.1 09/02/2023    ALBUMIN 3.2 (L) 09/02/2023    BILITOT 0.3 09/02/2023    ALKPHOS 89 09/02/2023    AST 44 (H) 09/02/2023    ALT 75 (H) 09/02/2023    ANIONGAP 11 09/04/2023    ESTGFRAFRICA >60 02/14/2020    EGFRNONAA >60 02/14/2020    TSH 1.792 12/06/2019       All pertinent labs reviewed    Assessment and Plan     Type 2 diabetes mellitus with hyperglycemia, with long-term current use of insulin  Well controlled based on CGM data with TIR of 96% and Average glucose of 132. Patient was able to discontinue all insulin and is doing well on metformin and GLP-1 RA         Plan  - Continue Dexcom G7  - Continue to metformin 500 mg BID   - Ozempic 1 mg once weekly   - Labs- A1c, CMP, microalbumin creatinine ratio, lipids, TSH, T4, T3, antibodies         BMI " 26.0-26.9,adult  Continue GLP-1 RA    Hypertriglyceridemia  No recent labs on file. Previous elevations in the setting of previously uncontrolled DM  Continue fibrate per cardiologist   Recheck lipids since glucose is well controlled    Elevated TSH  Patient reports previously elevated TSH. She states she was on medication for a few years, but does not remember the medication name. She has been off thyroid meds for 3 years. Denies any symptoms.     Plan   - Recheck TFTs plus antibodies        Follow-up in 6 months    Greta Gomez PA-C  Endocrinology

## 2024-06-07 NOTE — ASSESSMENT & PLAN NOTE
No recent labs on file. Previous elevations in the setting of previously uncontrolled DM  Continue fibrate per cardiologist   Recheck lipids since glucose is well controlled

## 2024-06-07 NOTE — ASSESSMENT & PLAN NOTE
Well controlled based on CGM data with TIR of 96% and Average glucose of 129. Patient was able to discontinue all insulin and is doing well on metformin and GLP-1 RA       Plan  - Continue Dexcom G7  - Continue to metformin 500 mg BID   - Ozempic 1 mg once weekly   - Labs- A1c, CMP, microalbumin creatinine ratio, lipids, TSH, T4, T3, antibodies

## 2024-06-10 ENCOUNTER — OFFICE VISIT (OUTPATIENT)
Dept: ENDOCRINOLOGY | Facility: CLINIC | Age: 39
End: 2024-06-10
Payer: MEDICAID

## 2024-06-10 ENCOUNTER — LAB VISIT (OUTPATIENT)
Dept: LAB | Facility: HOSPITAL | Age: 39
End: 2024-06-10
Attending: PHYSICIAN ASSISTANT
Payer: MEDICAID

## 2024-06-10 VITALS
BODY MASS INDEX: 24.47 KG/M2 | RESPIRATION RATE: 18 BRPM | OXYGEN SATURATION: 98 % | HEIGHT: 65 IN | WEIGHT: 146.88 LBS | HEART RATE: 96 BPM

## 2024-06-10 DIAGNOSIS — E78.1 HYPERTRIGLYCERIDEMIA: ICD-10-CM

## 2024-06-10 DIAGNOSIS — R79.89 ELEVATED TSH: ICD-10-CM

## 2024-06-10 DIAGNOSIS — Z79.4 TYPE 2 DIABETES MELLITUS WITH HYPERGLYCEMIA, WITH LONG-TERM CURRENT USE OF INSULIN: Primary | ICD-10-CM

## 2024-06-10 DIAGNOSIS — E11.65 TYPE 2 DIABETES MELLITUS WITH HYPERGLYCEMIA, WITH LONG-TERM CURRENT USE OF INSULIN: Primary | ICD-10-CM

## 2024-06-10 DIAGNOSIS — Z79.4 TYPE 2 DIABETES MELLITUS WITH HYPERGLYCEMIA, WITH LONG-TERM CURRENT USE OF INSULIN: ICD-10-CM

## 2024-06-10 DIAGNOSIS — E11.65 TYPE 2 DIABETES MELLITUS WITH HYPERGLYCEMIA, WITH LONG-TERM CURRENT USE OF INSULIN: ICD-10-CM

## 2024-06-10 LAB
ALBUMIN SERPL BCP-MCNC: 3.9 G/DL (ref 3.5–5.2)
ALBUMIN/CREAT UR: 13.5 UG/MG (ref 0–30)
ALP SERPL-CCNC: 62 U/L (ref 55–135)
ALT SERPL W/O P-5'-P-CCNC: 37 U/L (ref 10–44)
ANION GAP SERPL CALC-SCNC: 9 MMOL/L (ref 8–16)
AST SERPL-CCNC: 23 U/L (ref 10–40)
BILIRUB SERPL-MCNC: 0.6 MG/DL (ref 0.1–1)
BUN SERPL-MCNC: 13 MG/DL (ref 6–20)
CALCIUM SERPL-MCNC: 9.5 MG/DL (ref 8.7–10.5)
CHLORIDE SERPL-SCNC: 105 MMOL/L (ref 95–110)
CHOLEST SERPL-MCNC: 211 MG/DL (ref 120–199)
CHOLEST/HDLC SERPL: 5.3 {RATIO} (ref 2–5)
CO2 SERPL-SCNC: 22 MMOL/L (ref 23–29)
CREAT SERPL-MCNC: 0.7 MG/DL (ref 0.5–1.4)
CREAT UR-MCNC: 118.9 MG/DL (ref 15–325)
EST. GFR  (NO RACE VARIABLE): >60 ML/MIN/1.73 M^2
ESTIMATED AVG GLUCOSE: 143 MG/DL (ref 68–131)
GLUCOSE SERPL-MCNC: 130 MG/DL (ref 70–110)
HBA1C MFR BLD: 6.6 % (ref 4–5.6)
HDLC SERPL-MCNC: 40 MG/DL (ref 40–75)
HDLC SERPL: 19 % (ref 20–50)
LDLC SERPL CALC-MCNC: 117.8 MG/DL (ref 63–159)
MICROALBUMIN UR DL<=1MG/L-MCNC: 16 UG/ML
NONHDLC SERPL-MCNC: 171 MG/DL
POTASSIUM SERPL-SCNC: 4.1 MMOL/L (ref 3.5–5.1)
PROT SERPL-MCNC: 7.2 G/DL (ref 6–8.4)
SODIUM SERPL-SCNC: 136 MMOL/L (ref 136–145)
T3 SERPL-MCNC: 88 NG/DL (ref 60–180)
T4 FREE SERPL-MCNC: 0.97 NG/DL (ref 0.71–1.51)
THYROGLOB AB SERPL IA-ACNC: <4 IU/ML (ref 0–3.9)
THYROPEROXIDASE IGG SERPL-ACNC: <6 IU/ML
TRIGL SERPL-MCNC: 266 MG/DL (ref 30–150)
TSH SERPL DL<=0.005 MIU/L-ACNC: 3.01 UIU/ML (ref 0.4–4)

## 2024-06-10 PROCEDURE — 84439 ASSAY OF FREE THYROXINE: CPT | Performed by: PHYSICIAN ASSISTANT

## 2024-06-10 PROCEDURE — 99999 PR PBB SHADOW E&M-EST. PATIENT-LVL III: CPT | Mod: PBBFAC,,, | Performed by: PHYSICIAN ASSISTANT

## 2024-06-10 PROCEDURE — 80061 LIPID PANEL: CPT | Performed by: PHYSICIAN ASSISTANT

## 2024-06-10 PROCEDURE — 86376 MICROSOMAL ANTIBODY EACH: CPT | Performed by: PHYSICIAN ASSISTANT

## 2024-06-10 PROCEDURE — 36415 COLL VENOUS BLD VENIPUNCTURE: CPT | Performed by: PHYSICIAN ASSISTANT

## 2024-06-10 PROCEDURE — 84443 ASSAY THYROID STIM HORMONE: CPT | Performed by: PHYSICIAN ASSISTANT

## 2024-06-10 PROCEDURE — 99214 OFFICE O/P EST MOD 30 MIN: CPT | Mod: 25,S$PBB,, | Performed by: PHYSICIAN ASSISTANT

## 2024-06-10 PROCEDURE — 84480 ASSAY TRIIODOTHYRONINE (T3): CPT | Performed by: PHYSICIAN ASSISTANT

## 2024-06-10 PROCEDURE — 99213 OFFICE O/P EST LOW 20 MIN: CPT | Mod: PBBFAC | Performed by: PHYSICIAN ASSISTANT

## 2024-06-10 PROCEDURE — 82043 UR ALBUMIN QUANTITATIVE: CPT | Performed by: PHYSICIAN ASSISTANT

## 2024-06-10 PROCEDURE — 3008F BODY MASS INDEX DOCD: CPT | Mod: CPTII,,, | Performed by: PHYSICIAN ASSISTANT

## 2024-06-10 PROCEDURE — 80053 COMPREHEN METABOLIC PANEL: CPT | Performed by: PHYSICIAN ASSISTANT

## 2024-06-10 PROCEDURE — 83036 HEMOGLOBIN GLYCOSYLATED A1C: CPT | Performed by: PHYSICIAN ASSISTANT

## 2024-06-10 PROCEDURE — 86800 THYROGLOBULIN ANTIBODY: CPT | Performed by: PHYSICIAN ASSISTANT

## 2024-06-10 PROCEDURE — 4010F ACE/ARB THERAPY RXD/TAKEN: CPT | Mod: CPTII,,, | Performed by: PHYSICIAN ASSISTANT

## 2024-06-10 NOTE — ASSESSMENT & PLAN NOTE
Patient reports previously elevated TSH. She states she was on medication for a few years, but does not remember the medication name. She has been off thyroid meds for 3 years. Denies any symptoms.     Plan   - Recheck TFTs plus antibodies

## 2024-06-13 ENCOUNTER — PATIENT MESSAGE (OUTPATIENT)
Dept: ENDOCRINOLOGY | Facility: CLINIC | Age: 39
End: 2024-06-13
Payer: MEDICAID

## 2024-09-18 ENCOUNTER — PATIENT MESSAGE (OUTPATIENT)
Dept: ENDOCRINOLOGY | Facility: CLINIC | Age: 39
End: 2024-09-18
Payer: MEDICAID

## 2024-09-24 DIAGNOSIS — Z79.4 TYPE 2 DIABETES MELLITUS WITH HYPERGLYCEMIA, WITH LONG-TERM CURRENT USE OF INSULIN: ICD-10-CM

## 2024-09-24 DIAGNOSIS — E11.65 TYPE 2 DIABETES MELLITUS WITH HYPERGLYCEMIA, WITH LONG-TERM CURRENT USE OF INSULIN: ICD-10-CM

## 2024-09-24 RX ORDER — BLOOD-GLUCOSE SENSOR
1 EACH MISCELLANEOUS
Qty: 3 EACH | Refills: 11 | Status: SHIPPED | OUTPATIENT
Start: 2024-09-24 | End: 2025-09-24

## 2024-10-28 ENCOUNTER — PATIENT MESSAGE (OUTPATIENT)
Dept: ENDOCRINOLOGY | Facility: CLINIC | Age: 39
End: 2024-10-28
Payer: MEDICAID

## 2024-12-06 ENCOUNTER — OFFICE VISIT (OUTPATIENT)
Dept: ENDOCRINOLOGY | Facility: CLINIC | Age: 39
End: 2024-12-06
Payer: MEDICAID

## 2024-12-06 VITALS
WEIGHT: 148.19 LBS | BODY MASS INDEX: 25.3 KG/M2 | HEIGHT: 64 IN | OXYGEN SATURATION: 96 % | HEART RATE: 102 BPM | SYSTOLIC BLOOD PRESSURE: 108 MMHG | RESPIRATION RATE: 18 BRPM | DIASTOLIC BLOOD PRESSURE: 61 MMHG

## 2024-12-06 DIAGNOSIS — Z79.4 TYPE 2 DIABETES MELLITUS WITH HYPERGLYCEMIA, WITH LONG-TERM CURRENT USE OF INSULIN: ICD-10-CM

## 2024-12-06 DIAGNOSIS — E11.65 TYPE 2 DIABETES MELLITUS WITH HYPERGLYCEMIA, WITH LONG-TERM CURRENT USE OF INSULIN: ICD-10-CM

## 2024-12-06 DIAGNOSIS — R79.89 ELEVATED TSH: Primary | ICD-10-CM

## 2024-12-06 PROCEDURE — 99999 PR PBB SHADOW E&M-EST. PATIENT-LVL III: CPT | Mod: PBBFAC,,, | Performed by: PHYSICIAN ASSISTANT

## 2024-12-06 PROCEDURE — 99213 OFFICE O/P EST LOW 20 MIN: CPT | Mod: PBBFAC | Performed by: PHYSICIAN ASSISTANT

## 2024-12-06 RX ORDER — BLOOD-GLUCOSE SENSOR
1 EACH MISCELLANEOUS
Qty: 3 EACH | Refills: 11 | Status: SHIPPED | OUTPATIENT
Start: 2024-12-06 | End: 2025-12-06

## 2024-12-06 RX ORDER — METFORMIN HYDROCHLORIDE 500 MG/1
500 TABLET, EXTENDED RELEASE ORAL 2 TIMES DAILY WITH MEALS
Qty: 180 TABLET | Refills: 3 | Status: SHIPPED | OUTPATIENT
Start: 2024-12-06 | End: 2025-12-06

## 2024-12-06 RX ORDER — INSULIN ASPART 100 [IU]/ML
INJECTION, SOLUTION INTRAVENOUS; SUBCUTANEOUS
Qty: 9 ML | Refills: 0 | Status: SHIPPED | OUTPATIENT
Start: 2024-12-06

## 2024-12-06 NOTE — PROGRESS NOTES
Subjective:      Patient ID: Kiana Hardy is a 39 y.o. female.    Chief Complaint:  Type 2 diabetes mellitus    History of Present Illness  This is a 39 y.o. female. with a past medical history of type 2 diabetes mellitus, HTN, HLD here for evaluation.    Type 2 diabetes mellitus  Diagnosed around age 23    Current diabetes medications:  - Metformin 500 mg once daily   - Ozempic 1 mg once weekly     Past diabetes medications:  - Januvia  - Janumet  - Levemir     Lab Results   Component Value Date    CREATININE 0.7 06/10/2024    EGFRNORACEVR >60 06/10/2024     Known diabetic complications: HHS, mild DR    Weight trend:  Wt Readings from Last 8 Encounters:   12/06/24 67.2 kg (148 lb 3.2 oz)   06/10/24 66.6 kg (146 lb 14.4 oz)   02/15/24 70.3 kg (155 lb)   01/22/24 70.3 kg (155 lb)   08/31/23 76 kg (167 lb 8.8 oz)   04/22/23 70.9 kg (156 lb 6.7 oz)   05/18/21 70.3 kg (155 lb)   03/27/20 70.6 kg (155 lb 9.6 oz)     BMI Readings from Last 1 Encounters:   12/06/24 25.44 kg/m²     Family history of diabetes:  Parents, all grandparents, siblings    Prior visit with diabetes education: Yes    Current diet: Trying to cut processed carbs    Blood glucose monitoring at home: She was not able to use Dexcom due to insurance issues.     Outside labs  10/09/2024  TSH 2.5    Glucose 153       9/7/23  A1c 10.5%  LDL N/A, HDL 31, Tg 544    Diabetes Management Status  Statin: Not taking  ACE/ARB: Not taking    Screening or Prevention Patient's value   HgA1C Testing and Control   Lab Results   Component Value Date    HGBA1C 6.6 (H) 06/10/2024        LDL control Lab Results   Component Value Date    LDLCALC 117.8 06/10/2024      Nephropathy screening Lab Results   Component Value Date    MICALBCREAT 13.5 06/10/2024        Lab Results   Component Value Date    TSH 3.011 06/10/2024     Last eye exam: : 08/07/2018    Abnormal TSH  Patient states she was told she had elevated TSH years ago and was on medication at  "that time. She has been off meds for 3 years. Denies constipation, diarrhea, anxiety, hair loss, palpitations, hot/cold intolerance.     Latest Reference Range & Units 07/24/19 06:05 08/02/19 14:07 09/12/19 17:35 12/06/19 10:55   TSH 0.400 - 4.000 uIU/mL 4.042 (H) 1.417 2.14 1.792   Free T4 0.71 - 1.51 ng/dL 0.85        Review of Systems  As above    Social and family history reviewed  Current medications and allergies reviewed    Objective:   /61   Pulse 102   Resp 18   Ht 5' 4" (1.626 m)   Wt 67.2 kg (148 lb 3.2 oz)   SpO2 96%   BMI 25.44 kg/m²   Physical Exam  Alert, oriented    BP Readings from Last 1 Encounters:   12/06/24 108/61      Wt Readings from Last 1 Encounters:   12/06/24 1418 67.2 kg (148 lb 3.2 oz)     Body mass index is 25.44 kg/m².    Lab Review:   Lab Results   Component Value Date    HGBA1C 6.6 (H) 06/10/2024     Lab Results   Component Value Date    CHOL 211 (H) 06/10/2024    HDL 40 06/10/2024    LDLCALC 117.8 06/10/2024    TRIG 266 (H) 06/10/2024    CHOLHDL 19.0 (L) 06/10/2024     Lab Results   Component Value Date     06/10/2024    K 4.1 06/10/2024     06/10/2024    CO2 22 (L) 06/10/2024     (H) 06/10/2024    BUN 13 06/10/2024    CREATININE 0.7 06/10/2024    CALCIUM 9.5 06/10/2024    PROT 7.2 06/10/2024    ALBUMIN 3.9 06/10/2024    BILITOT 0.6 06/10/2024    ALKPHOS 62 06/10/2024    AST 23 06/10/2024    ALT 37 06/10/2024    ANIONGAP 9 06/10/2024    ESTGFRAFRICA >60 02/14/2020    EGFRNONAA >60 02/14/2020    TSH 3.011 06/10/2024     All pertinent labs reviewed    Assessment and Plan     Elevated TSH  Patient reports previously elevated TSH that has since normalized since being off medication. She has been off thyroid meds for 3 years. Denies any symptoms. Negative TPO antibodies.     Plan   - Recheck TFTs periodically    BMI 26.0-26.9,adult  Continue GLP-1 RA for weight loss benefit     Type 2 diabetes mellitus with hyperglycemia, with long-term current use of " insulin  Well controlled based on last A1c however that was 6 months ago. She has been unable to use Dexcom due to insurance issues. POCT glucose with prandial hyperglycemia. Will start Novolog as needed and restart Dexcom for better glucose monitoring. Will also increase GLP-1 RA    Plan  - Restart Dexcom G7   - Continue to metformin 500 mg once daily   - Increase Ozempic to 2 mg once weekly   - Labs from [facility]     Follow-up in 6 months    Greta Gomez PA-C  Endocrinology

## 2024-12-06 NOTE — ASSESSMENT & PLAN NOTE
Patient reports previously elevated TSH that has since normalized since being off medication. She has been off thyroid meds for 3 years. Denies any symptoms. Negative TPO antibodies.     Plan   - Recheck TFTs periodically

## 2024-12-06 NOTE — ASSESSMENT & PLAN NOTE
Well controlled based on last A1c however that was 6 months ago. She has been unable to use Dexcom due to insurance issues. POCT glucose with prandial hyperglycemia. Will start Novolog as needed and restart Dexcom for better glucose monitoring. Will also increase GLP-1 RA    Plan  - Restart Dexcom G7   - Continue to metformin 500 mg once daily   - Increase Ozempic to 2 mg once weekly   - Labs from [facility]

## 2024-12-09 ENCOUNTER — TELEPHONE (OUTPATIENT)
Dept: ENDOCRINOLOGY | Facility: CLINIC | Age: 39
End: 2024-12-09
Payer: MEDICAID

## 2025-02-17 ENCOUNTER — HOSPITAL ENCOUNTER (OUTPATIENT)
Dept: RADIOLOGY | Facility: HOSPITAL | Age: 40
Discharge: HOME | End: 2025-02-17
Attending: PHYSICIAN ASSISTANT
Payer: MEDICAID

## 2025-02-17 VITALS — HEIGHT: 64 IN | WEIGHT: 148 LBS | BODY MASS INDEX: 25.27 KG/M2

## 2025-02-17 DIAGNOSIS — Z12.31 ENCOUNTER FOR SCREENING MAMMOGRAM FOR BREAST CANCER: ICD-10-CM

## 2025-02-17 PROCEDURE — 77067 SCR MAMMO BI INCL CAD: CPT | Mod: TC

## 2025-04-25 ENCOUNTER — LAB VISIT (OUTPATIENT)
Dept: LAB | Facility: HOSPITAL | Age: 40
End: 2025-04-25
Attending: PHYSICIAN ASSISTANT
Payer: MEDICAID

## 2025-04-25 DIAGNOSIS — E11.65 TYPE 2 DIABETES MELLITUS WITH HYPERGLYCEMIA, WITH LONG-TERM CURRENT USE OF INSULIN: ICD-10-CM

## 2025-04-25 DIAGNOSIS — Z79.4 TYPE 2 DIABETES MELLITUS WITH HYPERGLYCEMIA, WITH LONG-TERM CURRENT USE OF INSULIN: ICD-10-CM

## 2025-04-25 LAB
ALBUMIN SERPL BCP-MCNC: 3.9 G/DL (ref 3.5–5.2)
ALBUMIN/CREAT UR: 14.2 UG/MG
ALP SERPL-CCNC: 56 UNIT/L (ref 40–150)
ALT SERPL W/O P-5'-P-CCNC: 24 UNIT/L (ref 10–44)
ANION GAP (OHS): 7 MMOL/L (ref 8–16)
AST SERPL-CCNC: 19 UNIT/L (ref 11–45)
BILIRUB SERPL-MCNC: 0.5 MG/DL (ref 0.1–1)
BUN SERPL-MCNC: 19 MG/DL (ref 6–20)
CALCIUM SERPL-MCNC: 8.9 MG/DL (ref 8.7–10.5)
CHLORIDE SERPL-SCNC: 108 MMOL/L (ref 95–110)
CO2 SERPL-SCNC: 25 MMOL/L (ref 23–29)
CREAT SERPL-MCNC: 0.8 MG/DL (ref 0.5–1.4)
CREAT UR-MCNC: 91.6 MG/DL (ref 15–325)
EAG (OHS): 128 MG/DL (ref 68–131)
GFR SERPLBLD CREATININE-BSD FMLA CKD-EPI: >60 ML/MIN/1.73/M2
GLUCOSE SERPL-MCNC: 110 MG/DL (ref 70–110)
HBA1C MFR BLD: 6.1 % (ref 4–5.6)
MICROALBUMIN UR-MCNC: 13 UG/ML (ref ?–5000)
POTASSIUM SERPL-SCNC: 4.3 MMOL/L (ref 3.5–5.1)
PROT SERPL-MCNC: 7.1 GM/DL (ref 6–8.4)
SODIUM SERPL-SCNC: 140 MMOL/L (ref 136–145)
TSH SERPL-ACNC: 1.75 UIU/ML (ref 0.4–4)

## 2025-04-25 PROCEDURE — 83036 HEMOGLOBIN GLYCOSYLATED A1C: CPT

## 2025-04-25 PROCEDURE — 84443 ASSAY THYROID STIM HORMONE: CPT

## 2025-04-25 PROCEDURE — 36415 COLL VENOUS BLD VENIPUNCTURE: CPT

## 2025-04-25 PROCEDURE — 82043 UR ALBUMIN QUANTITATIVE: CPT

## 2025-04-25 PROCEDURE — 80053 COMPREHEN METABOLIC PANEL: CPT

## 2025-04-26 ENCOUNTER — RESULTS FOLLOW-UP (OUTPATIENT)
Dept: ENDOCRINOLOGY | Facility: CLINIC | Age: 40
End: 2025-04-26

## 2025-04-29 DIAGNOSIS — E11.65 TYPE 2 DIABETES MELLITUS WITH HYPERGLYCEMIA, WITH LONG-TERM CURRENT USE OF INSULIN: ICD-10-CM

## 2025-04-29 DIAGNOSIS — Z79.4 TYPE 2 DIABETES MELLITUS WITH HYPERGLYCEMIA, WITH LONG-TERM CURRENT USE OF INSULIN: ICD-10-CM

## 2025-04-29 NOTE — TELEPHONE ENCOUNTER
----- Message from Abi sent at 4/29/2025  7:31 AM CDT -----  Contact: PATIENT  Kiana Hernandez PlaJairoRN: [id number]: 1985PCP: Benjy Ward [employer] Phone      [phone] Phone      Not on file.Mobile          [phone]: Patient is needing a refill on semaglutide (OZEMPIC) 1 mg, once weekly [facility]: [phone]  ----- Message -----  From: Abi Chatterjee  Sent: 4/29/2025   7:34 AM CDT  To: Annmarie Hernandes Staff    Kiana HarriseMRN: [id number]: 1985PCP: Benjy Ward [employer] Phone      [phone] Phone      Not on file.Mobile          [phone]: Patient is needing a refill on semaglutide (OZEMPIC) 1 mg, once weekly sent to [facility]/[place].  Please make [facility] the patients preferred pharmacy.Phone: [phone]

## 2025-04-30 RX ORDER — SEMAGLUTIDE 1.34 MG/ML
1 INJECTION, SOLUTION SUBCUTANEOUS
Qty: 3 ML | Refills: 11 | Status: SHIPPED | OUTPATIENT
Start: 2025-04-30 | End: 2026-04-30

## 2025-07-02 ENCOUNTER — TELEPHONE (OUTPATIENT)
Dept: ENDOCRINOLOGY | Facility: CLINIC | Age: 40
End: 2025-07-02
Payer: MEDICAID

## 2025-07-02 NOTE — TELEPHONE ENCOUNTER
----- Message from Abi sent at 2025  1:05 PM CDT -----  Contact: PATIENT  Kiana Hardy  MRN: [id number]  : 1985  PCP: Benjy Ward  Home Phone      [phone]  Work Phone      Not on file.  Mobile          [phone]      MESSAGE: Patient states that she has misplaced her Dexcom G7 transmitter and would like to know what she needs to do.        Phone: [phone]

## 2025-07-18 ENCOUNTER — TELEPHONE (OUTPATIENT)
Dept: ENDOCRINOLOGY | Facility: CLINIC | Age: 40
End: 2025-07-18
Payer: MEDICAID

## 2025-07-18 ENCOUNTER — OFFICE VISIT (OUTPATIENT)
Dept: ENDOCRINOLOGY | Facility: CLINIC | Age: 40
End: 2025-07-18
Payer: MEDICAID

## 2025-07-18 VITALS
HEIGHT: 64 IN | BODY MASS INDEX: 24.1 KG/M2 | WEIGHT: 141.13 LBS | DIASTOLIC BLOOD PRESSURE: 84 MMHG | SYSTOLIC BLOOD PRESSURE: 122 MMHG

## 2025-07-18 DIAGNOSIS — R79.89 ELEVATED TSH: ICD-10-CM

## 2025-07-18 DIAGNOSIS — Z79.4 TYPE 2 DIABETES MELLITUS WITH HYPERGLYCEMIA, WITH LONG-TERM CURRENT USE OF INSULIN: Primary | ICD-10-CM

## 2025-07-18 DIAGNOSIS — E11.65 TYPE 2 DIABETES MELLITUS WITH HYPERGLYCEMIA, WITH LONG-TERM CURRENT USE OF INSULIN: Primary | ICD-10-CM

## 2025-07-18 PROCEDURE — 99213 OFFICE O/P EST LOW 20 MIN: CPT | Mod: PBBFAC | Performed by: STUDENT IN AN ORGANIZED HEALTH CARE EDUCATION/TRAINING PROGRAM

## 2025-07-18 PROCEDURE — 99999 PR PBB SHADOW E&M-EST. PATIENT-LVL III: CPT | Mod: PBBFAC,,, | Performed by: STUDENT IN AN ORGANIZED HEALTH CARE EDUCATION/TRAINING PROGRAM

## 2025-07-18 RX ORDER — SEMAGLUTIDE 0.68 MG/ML
0.25 INJECTION, SOLUTION SUBCUTANEOUS
Qty: 3 ML | Refills: 5 | Status: SHIPPED | OUTPATIENT
Start: 2025-07-18

## 2025-07-18 NOTE — ASSESSMENT & PLAN NOTE
Controlled based on A1c 6.1%. Not wearing CGM as she lost . Provided with a new one.    She developed intolerance to high dose GLP-1 RA, will lower to smallest dose and keep as long as glucose remains controlled.    Plan  - Restart Dexcom G7   - Continue metformin 500 mg once daily   - Decrease Ozempic to 0.25 mg once weekly     F/u 2 months with labs

## 2025-07-18 NOTE — PROGRESS NOTES
Subjective:      Patient ID: Kiana Hardy is a 39 y.o. female.    Chief Complaint:  Type 2 diabetes mellitus    History of Present Illness  This is a 39 y.o. female. with a past medical history of type 2 diabetes mellitus, HTN, HLD here for evaluation.      Type 2 diabetes mellitus  Diagnosed around age 23    Current diabetes medications:  - Metformin 500 mg once daily   - Ozempic 1 mg once weekly - diarrhea, very significant (went away when Ozempic stopped)    Past diabetes medications:  - Januvia  - Janumet  - Levemir     Lab Results   Component Value Date    CREATININE 0.8 04/25/2025    EGFRNORACEVR >60 04/25/2025     Known diabetic complications: HHS, mild DR    Weight trend:  Wt Readings from Last 8 Encounters:   07/18/25 64 kg (141 lb 1.6 oz)   02/17/25 67.1 kg (148 lb)   12/06/24 67.2 kg (148 lb 3.2 oz)   06/10/24 66.6 kg (146 lb 14.4 oz)   02/15/24 70.3 kg (155 lb)   01/22/24 70.3 kg (155 lb)   08/31/23 76 kg (167 lb 8.8 oz)   04/22/23 70.9 kg (156 lb 6.7 oz)     BMI Readings from Last 1 Encounters:   07/18/25 24.22 kg/m²     Family history of diabetes:  Parents, all grandparents, siblings    Prior visit with diabetes education: Yes    Current diet: Trying to cut processed carbs    Blood glucose monitoring at home: No, lost       Outside labs  10/09/2024  TSH 2.5    Glucose 153       9/7/23  A1c 10.5%  LDL N/A, HDL 31, Tg 544    Diabetes Management Status  Statin: Not taking  ACE/ARB: Not taking    Screening or Prevention Patient's value   HgA1C Testing and Control   Lab Results   Component Value Date    HGBA1C 6.1 (H) 04/25/2025        LDL control Lab Results   Component Value Date    LDLCALC 117.8 06/10/2024      Nephropathy screening Lab Results   Component Value Date    MICALBCREAT 14.2 04/25/2025        Lab Results   Component Value Date    TSH 1.748 04/25/2025     Last eye exam: : 08/07/2018     06/10/24 09:59 04/25/25 09:16   TSH 3.011 1.748   T3, Total 88    Free T4  "0.97    Thyroglobulin Ab Screen <4.0    Thyroperoxidase Antibodies <6.0        Review of Systems  As above    Social and family history reviewed  Current medications and allergies reviewed    Objective:   /84 (BP Location: Right arm, Patient Position: Sitting)   Ht 5' 4" (1.626 m)   Wt 64 kg (141 lb 1.6 oz)   BMI 24.22 kg/m²   Physical Exam  Alert, oriented    BP Readings from Last 1 Encounters:   07/18/25 122/84      Wt Readings from Last 1 Encounters:   07/18/25 1411 64 kg (141 lb 1.6 oz)     Body mass index is 24.22 kg/m².    Lab Review:   Lab Results   Component Value Date    HGBA1C 6.1 (H) 04/25/2025     Lab Results   Component Value Date    CHOL 211 (H) 06/10/2024    HDL 40 06/10/2024    LDLCALC 117.8 06/10/2024    TRIG 266 (H) 06/10/2024    CHOLHDL 19.0 (L) 06/10/2024     Lab Results   Component Value Date     04/25/2025    K 4.3 04/25/2025     04/25/2025    CO2 25 04/25/2025     04/25/2025    BUN 19 04/25/2025    CREATININE 0.8 04/25/2025    CALCIUM 8.9 04/25/2025    PROT 7.1 04/25/2025    ALBUMIN 3.9 04/25/2025    BILITOT 0.5 04/25/2025    ALKPHOS 56 04/25/2025    AST 19 04/25/2025    ALT 24 04/25/2025    ANIONGAP 7 (L) 04/25/2025    ESTGFRAFRICA >60 02/14/2020    EGFRNONAA >60 02/14/2020    TSH 1.748 04/25/2025     All pertinent labs reviewed    Assessment and Plan     Type 2 diabetes mellitus with hyperglycemia, with long-term current use of insulin  Controlled based on A1c 6.1%. Not wearing CGM as she lost . Provided with a new one.    She developed intolerance to high dose GLP-1 RA, will lower to smallest dose and keep as long as glucose remains controlled.    Plan  - Restart Dexcom G7   - Continue metformin 500 mg once daily   - Decrease Ozempic to 0.25 mg once weekly     F/u 2 months with labs    Elevated TSH  Only isolated episode  Antibodies negative  Last TSH in range    BMI 26.0-26.9,adult  BMI is now 24  Continue GLP-1 RA but at lower dose as above        Dez " VIOLET Brush MD   Endocrinology

## (undated) DEVICE — LUBRICANT SURGILUBE 2 OZ

## (undated) DEVICE — LEGGING CLEAR POLY 2/PACK

## (undated) DEVICE — CLOSURE SKIN STERI STRIP 1/2X4

## (undated) DEVICE — Device

## (undated) DEVICE — TRAY DRY SKIN SCRUB PREP

## (undated) DEVICE — NDL SAFETY 22G X 1.5 ECLIPSE

## (undated) DEVICE — ELECTRODE BLADE TEFLON 6

## (undated) DEVICE — SPONGE LAP 18X18 PREWASHED

## (undated) DEVICE — SEE MEDLINE ITEM 146417

## (undated) DEVICE — SYR 10CC LUER LOCK

## (undated) DEVICE — TRAY FOLEY 16FR INFECTION CONT

## (undated) DEVICE — GLOVE BIOGEL SKINSENSE PI 6.5

## (undated) DEVICE — SUT VICRYL 2-0 36 CT-1

## (undated) DEVICE — SOL PVP-I SCRUB 7.5% 4OZ

## (undated) DEVICE — SUT MCRYL PLUS 4-0 PS2 27IN

## (undated) DEVICE — MARKER SKIN STND TIP BLUE BARR

## (undated) DEVICE — DRAPE STERI INSTRUMENT 1018

## (undated) DEVICE — DRESSING MEPORE 3.6 X 10

## (undated) DEVICE — SEE MEDLINE ITEM 146296

## (undated) DEVICE — LOOP VESSEL YELLOW MAXI

## (undated) DEVICE — SEE MEDLINE ITEM 152622

## (undated) DEVICE — CLIPPER BLADE MOD 4406 (CAREF)

## (undated) DEVICE — WARMER DRAPE STERILE LF

## (undated) DEVICE — SUT 0 8-27IN VICRYL PL CT-1

## (undated) DEVICE — APPLICATOR CHLORAPREP ORN 26ML

## (undated) DEVICE — SCRUB 10% POVIDONE IODINE 4OZ

## (undated) DEVICE — SEE MEDLINE ITEM 157117

## (undated) DEVICE — ELECTRODE REM PLYHSV RETURN 9

## (undated) DEVICE — SUT 0 54IN COATED VICRYL U

## (undated) DEVICE — SUT 1 48IN PDS II VIO MONO

## (undated) DEVICE — DRAPE UNDER BUTTOCKS WITH POUCH